# Patient Record
Sex: MALE | Race: WHITE | HISPANIC OR LATINO | Employment: OTHER | URBAN - METROPOLITAN AREA
[De-identification: names, ages, dates, MRNs, and addresses within clinical notes are randomized per-mention and may not be internally consistent; named-entity substitution may affect disease eponyms.]

---

## 2017-01-04 ENCOUNTER — OFFICE VISIT (OUTPATIENT)
Dept: SURGERY | Facility: CLINIC | Age: 47
End: 2017-01-04
Payer: MEDICAID

## 2017-01-04 VITALS
HEART RATE: 111 BPM | SYSTOLIC BLOOD PRESSURE: 155 MMHG | DIASTOLIC BLOOD PRESSURE: 95 MMHG | WEIGHT: 184.06 LBS | BODY MASS INDEX: 23.64 KG/M2

## 2017-01-04 DIAGNOSIS — K60.3 TRANSSPHINCTERIC ANAL FISTULA: Primary | ICD-10-CM

## 2017-01-04 PROCEDURE — 99999 PR PBB SHADOW E&M-EST. PATIENT-LVL III: CPT | Mod: PBBFAC,,, | Performed by: COLON & RECTAL SURGERY

## 2017-01-04 PROCEDURE — 99024 POSTOP FOLLOW-UP VISIT: CPT | Mod: ,,, | Performed by: COLON & RECTAL SURGERY

## 2017-01-04 PROCEDURE — 99213 OFFICE O/P EST LOW 20 MIN: CPT | Mod: PBBFAC | Performed by: COLON & RECTAL SURGERY

## 2017-01-04 RX ORDER — OXYCODONE AND ACETAMINOPHEN 5; 325 MG/1; MG/1
1 TABLET ORAL EVERY 4 HOURS PRN
Qty: 90 TABLET | Refills: 0 | Status: SHIPPED | OUTPATIENT
Start: 2017-01-04 | End: 2017-03-08 | Stop reason: SDUPTHER

## 2017-01-04 NOTE — PROGRESS NOTES
Reports pain since procedure.  Minimal d/c    Exam  Non toxic male in NAD    Visit Vitals    BP (!) 155/95 (BP Location: Right arm, Patient Position: Standing, BP Method: Automatic)    Pulse (!) 111    Wt 83.5 kg (184 lb 1.4 oz)    BMI 23.64 kg/m2         Wounds clean  No erythema.  No d/c    Assessment  Wound clean    Recommend  Local wound care  OV 6 weeks

## 2017-01-19 RX ORDER — IBUPROFEN 800 MG/1
800 TABLET ORAL 3 TIMES DAILY
Qty: 90 TABLET | Refills: 0 | Status: ON HOLD | OUTPATIENT
Start: 2017-01-19 | End: 2017-06-09 | Stop reason: HOSPADM

## 2017-01-25 ENCOUNTER — OFFICE VISIT (OUTPATIENT)
Dept: SURGERY | Facility: CLINIC | Age: 47
End: 2017-01-25
Payer: MEDICAID

## 2017-01-25 VITALS
DIASTOLIC BLOOD PRESSURE: 87 MMHG | WEIGHT: 184.06 LBS | HEART RATE: 93 BPM | SYSTOLIC BLOOD PRESSURE: 140 MMHG | BODY MASS INDEX: 23.64 KG/M2

## 2017-01-25 DIAGNOSIS — K60.3 ANAL FISTULA: Primary | ICD-10-CM

## 2017-01-25 PROCEDURE — 99999 PR PBB SHADOW E&M-EST. PATIENT-LVL III: CPT | Mod: PBBFAC,,, | Performed by: COLON & RECTAL SURGERY

## 2017-01-25 PROCEDURE — 99024 POSTOP FOLLOW-UP VISIT: CPT | Mod: ,,, | Performed by: COLON & RECTAL SURGERY

## 2017-01-25 PROCEDURE — 99213 OFFICE O/P EST LOW 20 MIN: CPT | Mod: PBBFAC | Performed by: COLON & RECTAL SURGERY

## 2017-01-25 NOTE — PROGRESS NOTES
Sore.  Possible new bump.  No increase in d/c.  Occasional BRB on paper/ gauze    Exam  Appears well  Visit Vitals    BP (!) 140/87 (BP Location: Left arm, Patient Position: Sitting, BP Method: Automatic)    Pulse 93    Wt 83.5 kg (184 lb 1.4 oz)    BMI 23.64 kg/m2     Wound      Assessment  No evidence of new abscess/ fistula  Wounds healing well      Recommend  Continue local wound care  Continue setons  Pt reassure.

## 2017-03-08 ENCOUNTER — OFFICE VISIT (OUTPATIENT)
Dept: SURGERY | Facility: CLINIC | Age: 47
End: 2017-03-08
Payer: MEDICAID

## 2017-03-08 VITALS
HEIGHT: 74 IN | SYSTOLIC BLOOD PRESSURE: 163 MMHG | BODY MASS INDEX: 24.84 KG/M2 | DIASTOLIC BLOOD PRESSURE: 89 MMHG | WEIGHT: 193.56 LBS | HEART RATE: 89 BPM

## 2017-03-08 DIAGNOSIS — K60.3 TRANSSPHINCTERIC ANAL FISTULA: Primary | ICD-10-CM

## 2017-03-08 PROCEDURE — 99213 OFFICE O/P EST LOW 20 MIN: CPT | Mod: PBBFAC | Performed by: COLON & RECTAL SURGERY

## 2017-03-08 PROCEDURE — 99999 PR PBB SHADOW E&M-EST. PATIENT-LVL III: CPT | Mod: PBBFAC,,, | Performed by: COLON & RECTAL SURGERY

## 2017-03-08 PROCEDURE — 99024 POSTOP FOLLOW-UP VISIT: CPT | Mod: ,,, | Performed by: COLON & RECTAL SURGERY

## 2017-03-08 RX ORDER — OXYCODONE AND ACETAMINOPHEN 5; 325 MG/1; MG/1
1 TABLET ORAL EVERY 4 HOURS PRN
Qty: 60 TABLET | Refills: 0 | Status: ON HOLD | OUTPATIENT
Start: 2017-03-08 | End: 2017-06-09 | Stop reason: HOSPADM

## 2017-03-08 NOTE — PROGRESS NOTES
"  Feels well.  Minimal pain, discharge.         BP (!) 163/89  Pulse 89  Ht 6' 2" (1.88 m)  Wt 87.8 kg (193 lb 9 oz)  BMI 24.85 kg/m2  penineal / perianal  Posterior midline wound healed  Induration of perianal area improved  Todays photo and prior exam in Dec below it            Assessment  Complex transsphincteric fistula improved    Maintain setons for now  OV 6 weeks to arrange next step surgery  "

## 2017-04-25 ENCOUNTER — OFFICE VISIT (OUTPATIENT)
Dept: SURGERY | Facility: CLINIC | Age: 47
End: 2017-04-25
Payer: MEDICARE

## 2017-04-25 VITALS — WEIGHT: 188.5 LBS | BODY MASS INDEX: 24.2 KG/M2 | SYSTOLIC BLOOD PRESSURE: 118 MMHG | DIASTOLIC BLOOD PRESSURE: 72 MMHG

## 2017-04-25 DIAGNOSIS — K60.3 TRANSSPHINCTERIC ANAL FISTULA: Primary | ICD-10-CM

## 2017-04-25 PROCEDURE — 99999 PR PBB SHADOW E&M-EST. PATIENT-LVL II: CPT | Mod: PBBFAC,,, | Performed by: COLON & RECTAL SURGERY

## 2017-04-25 PROCEDURE — 99213 OFFICE O/P EST LOW 20 MIN: CPT | Mod: S$PBB,,, | Performed by: COLON & RECTAL SURGERY

## 2017-04-25 PROCEDURE — 99212 OFFICE O/P EST SF 10 MIN: CPT | Mod: PBBFAC | Performed by: COLON & RECTAL SURGERY

## 2017-05-02 NOTE — PROGRESS NOTES
Feels better.  No pain.  Minimal d/c    Exam  Appears well  /72 (BP Location: Right leg, Patient Position: Sitting, BP Method: Automatic)  Wt 85.5 kg (188 lb 7.9 oz)  BMI 24.2 kg/m2  Rectal  Setons in position, no erythema.  No pus.  No tenderness.      Assessment  Complex fistula in ano    Recommend  Removed lateral seton  Soak in tub BID  OV 6 weeks

## 2017-05-24 ENCOUNTER — OFFICE VISIT (OUTPATIENT)
Dept: SURGERY | Facility: CLINIC | Age: 47
End: 2017-05-24
Payer: MEDICARE

## 2017-05-24 VITALS
WEIGHT: 186.06 LBS | SYSTOLIC BLOOD PRESSURE: 130 MMHG | DIASTOLIC BLOOD PRESSURE: 74 MMHG | HEIGHT: 74 IN | BODY MASS INDEX: 23.88 KG/M2 | HEART RATE: 65 BPM

## 2017-05-24 DIAGNOSIS — K60.3 FISTULA-IN-ANO: Primary | ICD-10-CM

## 2017-05-24 PROCEDURE — 99213 OFFICE O/P EST LOW 20 MIN: CPT | Mod: S$GLB,,, | Performed by: COLON & RECTAL SURGERY

## 2017-05-24 PROCEDURE — 99213 OFFICE O/P EST LOW 20 MIN: CPT | Mod: PBBFAC | Performed by: COLON & RECTAL SURGERY

## 2017-05-24 PROCEDURE — 99999 PR PBB SHADOW E&M-EST. PATIENT-LVL III: CPT | Mod: PBBFAC,,, | Performed by: COLON & RECTAL SURGERY

## 2017-05-24 NOTE — PROGRESS NOTES
"No significant pain.  Minimal d/c    Exam  Appears well  /74 (BP Location: Right arm, Patient Position: Sitting, BP Method: Automatic)   Pulse 65   Ht 6' 2.02" (1.88 m)   Wt 84.4 kg (186 lb 1.1 oz)   BMI 23.88 kg/m²   Rectal    No induration or fluctuance.  No d/c.  No mass  Not tender    Assessment  Complex fistula in ano, improved.  Probable residual intersphincteric fistula in ano.  NO signs of active suppuration.      Recommend  EUA, probable completion fistulotomy      "

## 2017-05-29 DIAGNOSIS — K60.3 ANAL FISTULA: Primary | ICD-10-CM

## 2017-06-09 ENCOUNTER — ANESTHESIA (OUTPATIENT)
Dept: SURGERY | Facility: HOSPITAL | Age: 47
End: 2017-06-09
Payer: MEDICARE

## 2017-06-09 ENCOUNTER — SURGERY (OUTPATIENT)
Age: 47
End: 2017-06-09

## 2017-06-09 ENCOUNTER — HOSPITAL ENCOUNTER (OUTPATIENT)
Facility: HOSPITAL | Age: 47
Discharge: HOME | End: 2017-06-09
Attending: COLON & RECTAL SURGERY | Admitting: COLON & RECTAL SURGERY
Payer: MEDICARE

## 2017-06-09 ENCOUNTER — ANESTHESIA EVENT (OUTPATIENT)
Dept: SURGERY | Facility: HOSPITAL | Age: 47
End: 2017-06-09
Payer: MEDICARE

## 2017-06-09 VITALS
HEART RATE: 64 BPM | BODY MASS INDEX: 23.74 KG/M2 | OXYGEN SATURATION: 100 % | SYSTOLIC BLOOD PRESSURE: 151 MMHG | RESPIRATION RATE: 13 BRPM | WEIGHT: 185 LBS | TEMPERATURE: 98 F | DIASTOLIC BLOOD PRESSURE: 71 MMHG | HEIGHT: 74 IN

## 2017-06-09 DIAGNOSIS — K60.3 FISTULA-IN-ANO: ICD-10-CM

## 2017-06-09 DIAGNOSIS — K60.3 ANAL FISTULA: Primary | ICD-10-CM

## 2017-06-09 PROCEDURE — 71000044 HC DOSC ROUTINE RECOVERY FIRST HOUR: Performed by: COLON & RECTAL SURGERY

## 2017-06-09 PROCEDURE — 88304 TISSUE EXAM BY PATHOLOGIST: CPT | Performed by: PATHOLOGY

## 2017-06-09 PROCEDURE — 71000015 HC POSTOP RECOV 1ST HR: Performed by: COLON & RECTAL SURGERY

## 2017-06-09 PROCEDURE — 25000003 PHARM REV CODE 250: Performed by: COLON & RECTAL SURGERY

## 2017-06-09 PROCEDURE — 25000003 PHARM REV CODE 250: Performed by: NURSE PRACTITIONER

## 2017-06-09 PROCEDURE — 36000707: Performed by: COLON & RECTAL SURGERY

## 2017-06-09 PROCEDURE — 63600175 PHARM REV CODE 636 W HCPCS: Performed by: NURSE ANESTHETIST, CERTIFIED REGISTERED

## 2017-06-09 PROCEDURE — D9220A PRA ANESTHESIA: Mod: ANES,,, | Performed by: ANESTHESIOLOGY

## 2017-06-09 PROCEDURE — 25000003 PHARM REV CODE 250: Performed by: NURSE ANESTHETIST, CERTIFIED REGISTERED

## 2017-06-09 PROCEDURE — 37000009 HC ANESTHESIA EA ADD 15 MINS: Performed by: COLON & RECTAL SURGERY

## 2017-06-09 PROCEDURE — D9220A PRA ANESTHESIA: Mod: CRNA,,, | Performed by: NURSE ANESTHETIST, CERTIFIED REGISTERED

## 2017-06-09 PROCEDURE — C1763 CONN TISS, NON-HUMAN: HCPCS | Performed by: COLON & RECTAL SURGERY

## 2017-06-09 PROCEDURE — 37000008 HC ANESTHESIA 1ST 15 MINUTES: Performed by: COLON & RECTAL SURGERY

## 2017-06-09 PROCEDURE — 36000706: Performed by: COLON & RECTAL SURGERY

## 2017-06-09 PROCEDURE — 71000045 HC DOSC ROUTINE RECOVERY EA ADD'L HR: Performed by: COLON & RECTAL SURGERY

## 2017-06-09 PROCEDURE — 88304 TISSUE EXAM BY PATHOLOGIST: CPT | Mod: 26,,, | Performed by: PATHOLOGY

## 2017-06-09 RX ORDER — IBUPROFEN 800 MG/1
800 TABLET ORAL 3 TIMES DAILY
Qty: 90 TABLET | Refills: 3 | Status: SHIPPED | OUTPATIENT
Start: 2017-06-09 | End: 2017-08-16

## 2017-06-09 RX ORDER — SODIUM CHLORIDE 9 MG/ML
INJECTION, SOLUTION INTRAVENOUS CONTINUOUS
Status: DISCONTINUED | OUTPATIENT
Start: 2017-06-09 | End: 2017-06-09 | Stop reason: HOSPADM

## 2017-06-09 RX ORDER — HYDROCODONE BITARTRATE AND ACETAMINOPHEN 5; 325 MG/1; MG/1
1 TABLET ORAL EVERY 4 HOURS PRN
Status: DISCONTINUED | OUTPATIENT
Start: 2017-06-09 | End: 2017-06-09 | Stop reason: HOSPADM

## 2017-06-09 RX ORDER — LIDOCAINE HYDROCHLORIDE 10 MG/ML
1 INJECTION, SOLUTION EPIDURAL; INFILTRATION; INTRACAUDAL; PERINEURAL ONCE
Status: COMPLETED | OUTPATIENT
Start: 2017-06-09 | End: 2017-06-09

## 2017-06-09 RX ORDER — LIDOCAINE HCL/PF 100 MG/5ML
SYRINGE (ML) INTRAVENOUS
Status: DISCONTINUED | OUTPATIENT
Start: 2017-06-09 | End: 2017-06-09

## 2017-06-09 RX ORDER — FENTANYL CITRATE 50 UG/ML
INJECTION, SOLUTION INTRAMUSCULAR; INTRAVENOUS
Status: DISCONTINUED | OUTPATIENT
Start: 2017-06-09 | End: 2017-06-09

## 2017-06-09 RX ORDER — ONDANSETRON HYDROCHLORIDE 2 MG/ML
INJECTION, SOLUTION INTRAMUSCULAR; INTRAVENOUS
Status: DISCONTINUED | OUTPATIENT
Start: 2017-06-09 | End: 2017-06-09

## 2017-06-09 RX ORDER — OXYCODONE AND ACETAMINOPHEN 5; 325 MG/1; MG/1
1 TABLET ORAL EVERY 4 HOURS PRN
Qty: 90 TABLET | Refills: 0 | Status: SHIPPED | OUTPATIENT
Start: 2017-06-09 | End: 2017-08-16

## 2017-06-09 RX ORDER — KETAMINE HYDROCHLORIDE 100 MG/ML
INJECTION, SOLUTION INTRAMUSCULAR; INTRAVENOUS
Status: DISCONTINUED | OUTPATIENT
Start: 2017-06-09 | End: 2017-06-09

## 2017-06-09 RX ORDER — ACETAMINOPHEN 10 MG/ML
INJECTION, SOLUTION INTRAVENOUS
Status: DISCONTINUED | OUTPATIENT
Start: 2017-06-09 | End: 2017-06-09

## 2017-06-09 RX ORDER — SODIUM CHLORIDE 9 MG/ML
INJECTION, SOLUTION INTRAVENOUS CONTINUOUS PRN
Status: DISCONTINUED | OUTPATIENT
Start: 2017-06-09 | End: 2017-06-09

## 2017-06-09 RX ORDER — LIDOCAINE HYDROCHLORIDE 10 MG/ML
INJECTION, SOLUTION EPIDURAL; INFILTRATION; INTRACAUDAL; PERINEURAL
Status: DISCONTINUED | OUTPATIENT
Start: 2017-06-09 | End: 2017-06-09 | Stop reason: HOSPADM

## 2017-06-09 RX ORDER — PROPOFOL 10 MG/ML
VIAL (ML) INTRAVENOUS
Status: DISCONTINUED | OUTPATIENT
Start: 2017-06-09 | End: 2017-06-09

## 2017-06-09 RX ORDER — PROPOFOL 10 MG/ML
VIAL (ML) INTRAVENOUS CONTINUOUS PRN
Status: DISCONTINUED | OUTPATIENT
Start: 2017-06-09 | End: 2017-06-09

## 2017-06-09 RX ORDER — MIDAZOLAM HYDROCHLORIDE 1 MG/ML
INJECTION, SOLUTION INTRAMUSCULAR; INTRAVENOUS
Status: DISCONTINUED | OUTPATIENT
Start: 2017-06-09 | End: 2017-06-09

## 2017-06-09 RX ORDER — BUPIVACAINE HYDROCHLORIDE AND EPINEPHRINE 2.5; 5 MG/ML; UG/ML
INJECTION, SOLUTION EPIDURAL; INFILTRATION; INTRACAUDAL; PERINEURAL
Status: DISCONTINUED | OUTPATIENT
Start: 2017-06-09 | End: 2017-06-09 | Stop reason: HOSPADM

## 2017-06-09 RX ORDER — IBUPROFEN 200 MG
600 TABLET ORAL EVERY 6 HOURS PRN
Status: DISCONTINUED | OUTPATIENT
Start: 2017-06-09 | End: 2017-06-09 | Stop reason: HOSPADM

## 2017-06-09 RX ORDER — MOXIFLOXACIN HYDROCHLORIDE 400 MG/1
400 TABLET ORAL DAILY
Qty: 5 TABLET | Refills: 0 | Status: SHIPPED | OUTPATIENT
Start: 2017-06-09 | End: 2017-06-14

## 2017-06-09 RX ORDER — MOXIFLOXACIN HYDROCHLORIDE 400 MG/1
400 TABLET ORAL DAILY
Qty: 5 TABLET | Refills: 0 | Status: SHIPPED | OUTPATIENT
Start: 2017-06-09 | End: 2017-06-09

## 2017-06-09 RX ADMIN — LIDOCAINE HYDROCHLORIDE 0.2 MG: 10 INJECTION, SOLUTION EPIDURAL; INFILTRATION; INTRACAUDAL; PERINEURAL at 10:06

## 2017-06-09 RX ADMIN — PROPOFOL 20 MG: 10 INJECTION, EMULSION INTRAVENOUS at 11:06

## 2017-06-09 RX ADMIN — SODIUM CHLORIDE, SODIUM GLUCONATE, SODIUM ACETATE, POTASSIUM CHLORIDE, MAGNESIUM CHLORIDE, SODIUM PHOSPHATE, DIBASIC, AND POTASSIUM PHOSPHATE: .53; .5; .37; .037; .03; .012; .00082 INJECTION, SOLUTION INTRAVENOUS at 12:06

## 2017-06-09 RX ADMIN — MIDAZOLAM HYDROCHLORIDE 2 MG: 1 INJECTION, SOLUTION INTRAMUSCULAR; INTRAVENOUS at 11:06

## 2017-06-09 RX ADMIN — BUPIVACAINE HYDROCHLORIDE AND EPINEPHRINE BITARTRATE 30 ML: 2.5; .0091 INJECTION, SOLUTION EPIDURAL; INFILTRATION; INTRACAUDAL; PERINEURAL at 12:06

## 2017-06-09 RX ADMIN — FENTANYL CITRATE 25 MCG: 50 INJECTION, SOLUTION INTRAMUSCULAR; INTRAVENOUS at 11:06

## 2017-06-09 RX ADMIN — FENTANYL CITRATE 50 MCG: 50 INJECTION, SOLUTION INTRAMUSCULAR; INTRAVENOUS at 11:06

## 2017-06-09 RX ADMIN — FENTANYL CITRATE 25 MCG: 50 INJECTION, SOLUTION INTRAMUSCULAR; INTRAVENOUS at 12:06

## 2017-06-09 RX ADMIN — LIDOCAINE HYDROCHLORIDE 20 ML: 10 INJECTION, SOLUTION EPIDURAL; INFILTRATION; INTRACAUDAL; PERINEURAL at 01:06

## 2017-06-09 RX ADMIN — SODIUM CHLORIDE: 0.9 INJECTION, SOLUTION INTRAVENOUS at 10:06

## 2017-06-09 RX ADMIN — LIDOCAINE HYDROCHLORIDE 100 MG: 20 INJECTION, SOLUTION INTRAVENOUS at 11:06

## 2017-06-09 RX ADMIN — PROPOFOL 150 MCG/KG/MIN: 10 INJECTION, EMULSION INTRAVENOUS at 11:06

## 2017-06-09 RX ADMIN — ACETAMINOPHEN 1000 MG: 10 INJECTION, SOLUTION INTRAVENOUS at 11:06

## 2017-06-09 RX ADMIN — SODIUM CHLORIDE: 0.9 INJECTION, SOLUTION INTRAVENOUS at 11:06

## 2017-06-09 RX ADMIN — KETAMINE HYDROCHLORIDE 30 MG: 100 INJECTION, SOLUTION, CONCENTRATE INTRAMUSCULAR; INTRAVENOUS at 11:06

## 2017-06-09 RX ADMIN — ONDANSETRON 4 MG: 2 INJECTION, SOLUTION INTRAMUSCULAR; INTRAVENOUS at 11:06

## 2017-06-09 RX ADMIN — HYDROCODONE BITARTRATE AND ACETAMINOPHEN 1 TABLET: 5; 325 TABLET ORAL at 01:06

## 2017-06-09 RX ADMIN — SODIUM CHLORIDE: 0.9 INJECTION, SOLUTION INTRAVENOUS at 01:06

## 2017-06-09 NOTE — PROGRESS NOTES
Dr. Ventura at bedside to assess patient and discuss findings and recommendations with patient and patient's family.

## 2017-06-09 NOTE — PLAN OF CARE
Patient and patient's mother received discharge instructions and prescriptions.  Patient and patient's mother verbalized understanding of all instructions given and all questions were addressed prior to patient's discharge.  Patient's vital signs are stable and within patient's baseline.  Patient tolerated clear liquids PO.  Patient voided 375 mL of clear yellow urine without difficulty in post-op.  Patient denies pain.  Patient denies nausea and vomiting at this time.  Patient meets all criteria for discharge at this time.  All required consents present in patient's chart upon patient's discharge.

## 2017-06-09 NOTE — BRIEF OP NOTE
[facility]  Brief Operative Note    SUMMARY     Surgery Date: 6/9/2017     Surgeon(s) and Role:     * Patric Ventura MD - Fellow     * TROY Inman MD - Primary    Assisting Surgeon: None    Pre-op Diagnosis:  Anal fistula [K60.3]    Operative Care:  Post-op Diagnosis: Post-Op Diagnosis Codes:     * Anal fistula [K60.3]    Procedure(s) (LRB):  EXAM UNDER ANESTHESIA (N/A)  FISTULECTOMY  PLACEMENT-ANAL PLUG    Anesthesia: General/MAC  Total volume administered 1100   Total UOP: N/A    Technical Procedures Used: fistulectomy and anal fistula plug    Description of the findings of the procedure: RPL external opening with internal opening at the RPL of anus 2cm proximal to the dentate line  Wound Class (Dirty    Complications: No     Estimated Blood Loss: 25 mL           Specimens:   Specimen (12h ago through future)    Start     Ordered    06/09/17 1307  Specimen to Pathology - Surgery  Once     Comments:  1.) fistula- permanent.      06/09/17 1306          Implants:   Implant Name Type Inv. Item Serial No.  Lot No. LRB No. Used   fistula plug         [id number] N/A 1       Post-Operative Care:         Disposition: PACU - hemodynamically stable.           Condition: Good  PT Temp >36 upon leaving the OR? Yes

## 2017-06-09 NOTE — ANESTHESIA PREPROCEDURE EVALUATION
06/09/2017  Anmol Angeles Jr. is a 46 y.o., male.    Pre-op Assessment    I have reviewed the Patient Summary Reports.     I have reviewed the Nursing Notes.   I have reviewed the Medications.     Review of Systems  Anesthesia Hx:  No problems with previous Anesthesia  History of prior surgery of interest to airway management or planning: Previous anesthesia: General Denies Family Hx of Anesthesia complications.   Denies Personal Hx of Anesthesia complications.   Social:  Smoker    Cardiovascular:   Exercise tolerance: good    Neurological:   Headaches Seizures, well controlled   Chronic Pain Syndrome   Psych:   Psychiatric History          Physical Exam  General:  Well nourished    Airway/Jaw/Neck:  Airway Findings: Mouth Opening: Normal Tongue: Normal  General Airway Assessment: Adult  Mallampati: II  Improves to II with phonation.  TM Distance: Normal, at least 6 cm  Jaw/Neck Findings:  Neck ROM: Normal ROM      Dental:  Dental Findings: In tact   Chest/Lungs:  Chest/Lungs Findings: Clear to auscultation, Normal Respiratory Rate     Heart/Vascular:  Heart Findings: Rate: Normal  Rhythm: Regular Rhythm  Sounds: Normal        Mental Status:  Mental Status Findings:  Cooperative, Alert and Oriented         Anesthesia Plan  Type of Anesthesia, risks & benefits discussed:  Anesthesia Type:  general  Patient's Preference:   Intra-op Monitoring Plan: standard ASA monitors  Intra-op Monitoring Plan Comments:   Post Op Pain Control Plan:   Post Op Pain Control Plan Comments:   Induction:   IV  Beta Blocker:  Patient is not currently on a Beta-Blocker (No further documentation required).       Informed Consent: Patient understands risks and agrees with Anesthesia plan.  Questions answered. Anesthesia consent signed with patient.  ASA Score: 2     Day of Surgery Review of History & Physical:    H&P update referred to  the surgeon.         Ready For Surgery From Anesthesia Perspective.

## 2017-06-09 NOTE — H&P
"  CC complex chronic fistula in ano, transsphincteric      HPI  48 yo male with chronic complex fistula.   One last fistula with seton.  No significant pain.  Minimal d/c    Past Medical History:   Diagnosis Date    Perirectal fistula     Seizures      Past Surgical History:   Procedure Laterality Date    BACK SURGERY      COLONOSCOPY N/A 8/22/2016    Procedure: COLONOSCOPY;  Surgeon: TROY Inman MD;  Location: [facility] ([address] [place] [address]);  Service: Endoscopy;  Laterality: N/A;    fistula surgery      LEG SURGERY      plate    LEG SURGERY Right      Review of patient's allergies indicates:   Allergen Reactions    Latex, natural rubber      No current facility-administered medications on file prior to encounter.      No current outpatient prescriptions on file prior to encounter.     History reviewed. No pertinent family history.  Social History     Social History    Marital status: Single     Spouse name: N/A    Number of children: N/A    Years of education: N/A     Occupational History    Maintenance with [facility]      Social History Main Topics    Smoking status: Current Every Day Smoker     Packs/day: 1.50     Years: 30.00    Smokeless tobacco: Current User     Types: Chew    Alcohol use Yes      Comment: beer occasionally     Drug use:      Types: Marijuana    Sexual activity: Not on file     Other Topics Concern    Not on file     Social History Narrative    No narrative on file         Exam  Appears well  BP (!) 132/90 (BP Location: Left arm, Patient Position: Lying, BP Method: Automatic)   Pulse 64   Temp 98.4 °F (36.9 °C) (Oral)   Resp 16   Ht 6' 2" (1.88 m)   Wt 83.9 kg (185 lb)   SpO2 100%   BMI 23.75 kg/m²    AT NC EOMI  Neck supple, trachea midline  Chest symmetric nl excursions, no retractions  Breathing comfortably  COR RRR   Rectal    No induration or fluctuance.  No d/c.  No mass  Not tender  Ext no CCE  Neuro mood nl, alert, oriented, Mendoza purposefully,  " Gait nl    Assessment  Complex fistula in ano, improved.  Probable residual intersphincteric fistula in ano.  NO signs of active suppuration.      Recommend  EUA, probable completion fistulotomy    The patient has been examined and the H&P has been reviewed:  I concur with the findings and no changes have occurred since H&P was written.      Anesthesia/Surgery risks, benefits and alternative options discussed and understood by patient/family.

## 2017-06-09 NOTE — TRANSFER OF CARE
"Anesthesia Transfer of Care Note    Patient: Anmol Angeles Jr.    Procedure(s) Performed: Procedure(s) (LRB):  EXAM UNDER ANESTHESIA (N/A)  FISTULECTOMY  PLACEMENT-ANAL PLUG    Patient location: [facility]    Anesthesia Type: general    Transport from OR: Transported from OR on 6-10 L/min O2 by face mask with adequate spontaneous ventilation    Post pain: adequate analgesia    Post assessment: tolerated procedure well and no apparent anesthetic complications    Post vital signs: stable    Level of consciousness: sedated and responds to stimulation    Nausea/Vomiting: no nausea/vomiting    Complications: none    Transfer of care protocol was followed      Last vitals:   Visit Vitals  BP (!) 132/90 (BP Location: Left arm, Patient Position: Lying, BP Method: Automatic)   Pulse 64   Temp 36.9 °C (98.4 °F) (Oral)   Resp 16   Ht 6' 2" (1.88 m)   Wt 83.9 kg (185 lb)   SpO2 100%   BMI 23.75 kg/m²     "

## 2017-06-09 NOTE — PROGRESS NOTES
"No significant pain.  Minimal d/c    Exam  Appears well  BP (!) 132/90 (BP Location: Left arm, Patient Position: Lying, BP Method: Automatic)   Pulse 64   Temp 98.4 °F (36.9 °C) (Oral)   Resp 16   Ht 6' 2" (1.88 m)   Wt 83.9 kg (185 lb)   SpO2 100%   BMI 23.75 kg/m²   Rectal    No induration or fluctuance.  No d/c.  No mass  Not tender    Assessment  Complex fistula in ano, improved.  Probable residual intersphincteric fistula in ano.  NO signs of active suppuration.      Recommend  EUA, probable completion fistulotomy    The patient has been examined and the H&P has been reviewed:  I concur with the findings and no changes have occurred since H&P was written.      Anesthesia/Surgery risks, benefits and alternative options discussed and understood by patient/family.      "

## 2017-06-09 NOTE — PROGRESS NOTES
Notified Dr. Buck and OR desk patient had a sip of milk at 0530 this morning. Was instructed procedure will be pushed back until 1130. Patient updated.

## 2017-06-09 NOTE — DISCHARGE INSTRUCTIONS
Home Care Instructions  ANAL/RECTAL SURGERY    ACTIVITY LEVEL: If you received sedation and/or an anesthetic, you may feel sleepy for several hours.  Rest until you feel more awake. Gradually resume your normal activities.    DIET: At home, continue with liquids. If there is no nausea, you may eat a soft diet and gradually resume a high fiber diet. Encourage fluids.    SPECIAL INSTRUCTIONS: Eat plenty of fruits and vegetables. Drink 8-10 glasses of water or juice every day. Eat whole grain cereals and breads. Salads with raw vegetables are also a good source of fiber.    DRESSING: Remove your bandage in 24 hours.  You may use 4 x 4 gauze to the surgical area for any drainage. Wash the area with mild soap during your regular bath.  Use soft, damp tissue or disposable wipes not containing alcohol when wiping after bowel movements, pat the area and gently dry.  Avoid excessive or vigorous wiping. It may help to place soft tissue or a cotton pad between the buttocks to keep the cheeks  and to absorb any moisture.    MEDICIATIONS: You will receive instructions for your pain and/or antibiotic prescriptions. Pain medication should be taken only if needed, and as directed. Antibiotics should be taken as directed until the entire prescription is completed. If ordered, take stool softeners as directed.    WHEN TO CALL THE DOCTOR:   For any obvious active bleeding   Redness or swelling around the incision   Fever over 100.4°F   Drainage (pus) from the wound   Persistent pain not relieved by the pain medication    RETURN APPOINTMENT: If your follow up appointment has not already been scheduled, please call Dr. Inman clinic office to schedule your follow up appointment as soon as possible.    FOR EMERGENCIES:  If any unusual problems or difficulties occur, contact Dr. Inman or the resident at [phone] (page ) or at the Clinic office, [phone].

## 2017-06-09 NOTE — PROGRESS NOTES
Notified Dr. Almonte that patient has met all criteria for release from Anesthesia's care.  Dr. Do stated that he would release patient from Anesthesia's care.

## 2017-06-09 NOTE — PLAN OF CARE
Patient arrived from OR with SIRISHA Bose RN and ROCKY Arauz CRNA.  Patient stable.  Report received at this time.  Assumed care of patient at this time.

## 2017-06-09 NOTE — DISCHARGE SUMMARY
[facility]  Discharge Note  Short Stay    Admit Date: 6/9/2017    Discharge Date and Time: 6/9/2017  4:25 PM     Attending Physician: TROY Inman MD    Discharge Provider: Patric Ventura    Diagnoses:  Active Hospital Problems    Diagnosis  POA    Fistula-in-ano [K60.3]  Yes    Anal fistula [K60.3]  Yes      Resolved Hospital Problems    Diagnosis Date Resolved POA   No resolved problems to display.       Discharged Condition: good    Hospital Course: Patient was admitted for an outpatient procedure and tolerated the procedure well with no complications.    Final Diagnoses: Same as principal problem.    Disposition: Home or Self Care    Follow up/Patient Instructions:    Medications:  Reconciled Home Medications:   Discharge Medication List as of 6/9/2017  3:18 PM      CONTINUE these medications which have CHANGED    Details   ibuprofen (ADVIL,MOTRIN) 800 MG tablet Take 1 tablet (800 mg total) by mouth 3 (three) times daily., Starting Fri 6/9/2017, Print      moxifloxacin (AVELOX) 400 mg tablet Take 1 tablet (400 mg total) by mouth once daily., Starting Fri 6/9/2017, Until Wed 6/14/2017, Print      oxycodone-acetaminophen (PERCOCET) 5-325 mg per tablet Take 1 tablet by mouth every 4 (four) hours as needed for Pain., Starting Fri 6/9/2017, Print             Discharge Procedure Orders  Diet general     Call MD for:  temperature >100.4     Call MD for:  persistent nausea and vomiting     Call MD for:  severe uncontrolled pain     Call MD for:  redness, tenderness, or signs of infection (pain, swelling, redness, odor or green/yellow discharge around incision site)     No dressing needed     Remove dressing in 24 hours     Wound care routine (specify)   Order Comments: Wound care routine:    Some bleeding expected  Take fiber supplements such as Metamucil, Citrucel, Konsyl,  The goal is 1-2 nonstraining nonloose bowel movements per day.  Sitz Baths or tub soaks three times a day in warm water  We  recommend 25-30 grams of fiber daily to reach the above bowel movement goal.     Activity as tolerated   Order Comments: No Bike riding  No vigourous activity   No lifiting  No smoking or drinking   Take antibiotics as prescribed       Follow-up Information     H Emerson Inman MD. Schedule an appointment as soon as possible for a visit in 2 weeks.    Specialty:  Colon and Rectal Surgery  Contact information:  [address]  [facility] [id number] [phone]

## 2017-06-09 NOTE — ANESTHESIA POSTPROCEDURE EVALUATION
"Anesthesia Post Evaluation    Patient: Anmol Angeles Jr.    Procedure(s) Performed: Procedure(s) (LRB):  EXAM UNDER ANESTHESIA (N/A)  FISTULECTOMY  PLACEMENT-ANAL PLUG    Final Anesthesia Type: general  Patient location during evaluation: PACU  Patient participation: Yes- Able to Participate  Level of consciousness: awake and alert  Post-procedure vital signs: reviewed and stable  Pain management: adequate  Airway patency: patent  PONV status at discharge: No PONV  Anesthetic complications: no      Cardiovascular status: blood pressure returned to baseline  Respiratory status: unassisted  Hydration status: euvolemic          Visit Vitals  /80 (BP Location: Right arm, Patient Position: Lying, BP Method: cNIBP)   Pulse (!) 49   Temp 36.5 °C (97.7 °F) (Temporal)   Resp 10   Ht 6' 2" (1.88 m)   Wt 83.9 kg (185 lb)   SpO2 100%   BMI 23.75 kg/m²       Pain/Myles Score: Pain Assessment Performed: Yes (6/9/2017  1:25 PM)  Presence of Pain: complains of pain/discomfort (6/9/2017  1:25 PM)  Pain Rating Prior to Med Admin: 6 (6/9/2017  1:25 PM)  Myles Score: 9 (6/9/2017  1:25 PM)      "

## 2017-06-14 NOTE — OP NOTE
DATE OF PROCEDURE:  06/09/2017    INDICATIONS:  The patient is a 47-year-old male with a history of a complex   transsphincteric fistula-in-ano.  He essentially came to me several years ago   with a failed fistula operation by an outside hospital.  Essentially, he had a   horseshoe type fistula with external openings over both ischiorectal fossae as   well as the posterior midline.  Essentially, all the fistulas have now healed   with a variety of treatments, fistulectomy, and seton insertion, and simple   fistulotomy.  He has a fistula in the right posterolateral position, which has   been well controlled with a non-cutting seton.  The patient was brought to the   Operating Room at this time for definitive surgical treatment, possible   fistulotomy.    PREOPERATIVE DIAGNOSIS:  Complex fistula-in-ano, probable transsphincteric.    POSTOPERATIVE DIAGNOSIS:  Complex fistula-in-ano, probable transsphincteric.    NAME OF PROCEDURE:  Exam under anesthesia, repair of chronic fistula with   fistulectomy and fistula plug insertion.    SURGEON:  TROY Inman M.D.    ASSISTANT SURGEON:  Patric Ventura M.D. (RES)    TYPE OF ANESTHESIA:  General endotracheal.    ESTIMATED BLOOD LOSS:  25 mL.    DRAINS:  None.    SPECIMEN:  Fistulectomy specimen.    FINDINGS:  External opening was noted in the right posterolateral position.  It   was essentially transsphincteric with an internal opening that was above the   level of the dentate line.  This was felt to be not amenable to fistulotomy.    Because of the high location of the fistula and the presence of significant   scarring in the region of the internal opening, he was also felt not to be a   candidate for endorectal advancement flap.  The decision was made to perform   fistulectomy with excision of the distal aspect of the fistula and repair of the   fistula with an insertion of a [place] BIO-A fistula plug.    TECHNIQUE IN DETAIL:  The patient was brought to the Operating Room,  positively   identified, and remained on the gurney in the supine position.  The patient   underwent general endotracheal anesthesia without complication.  He was then   placed on the operating table in a prone jackknife position with buttock cheeks   taped apart.  His perianal skin was prepared and draped in the usual fashion.    Critical timeout was performed.  Exam under anesthesia revealed evidence of a   fistula with an external opening in the right posterior position.  The external   opening was noted to be outside the external sphincter muscle and therefore   constituted a transsphincteric fistula.  Digital rectal examination was   unremarkable.  There was no evidence of any pus.  There was induration that was   chronic in the right posterior and right lateral position.  There was no   fluctuance to suggest ongoing suppuration.  A lighted Violetta-Eaton retractor   was placed in his anal canal.  We could visualize the internal opening, which   was a cm above the level of the dentate line.  This fistula was felt not to be a   candidate for fistulotomy given its transsphincteric anatomy.  In addition, the   internal opening was noted to be above the level of the dentate line and there   was significant scarring around the external opening, which did not appear to be   amenable to endorectal advancement flap treatment.  The decision was made at   this point to proceed to repair using a fistula plug.  I elected to perform   fistulectomy.  An incision encompassed the external opening and the external   opening was grasped with an Allis clamp.  The distal two-thirds of the fistula   was then excised sharply and handed off the operative field.  The wound was   irrigated with saline solution.  I then brought in a [place] BIO-A fistula plug on   to the field.  I assessed the diameter of the residual fistula at its opening.    We excised two of the appendages of the fistula plug and then sutured the other   appendages  together using 0 silk suture.  This was lubricated.  I placed a right   angle clamp through the fistula tract and grasped the silk tie and the end of   the fistula plug and I brought the fistula plug through the tract.  The button   at the end of the tract was then sutured to the rectal mucosa using interrupted   3-0 Vicryl suture with purchases of this mucosa, submucosa, and internal   sphincter muscle.    The plugs filled the fistula tract and they were trimmed at the level of the   skin.  The wound was irrigated with saline solution.  We injected local   anesthetic around the fistula site.  A dry gauze dressing was applied to the   wound and held in place with mesh underwear.    The patient tolerated the procedure well and there were no complications noted.    He was returned to the supine position, awakened from anesthesia, and extubated   without incident.      CLEMENTE/LISBETH  dd: 06/14/2017 17:51:56 (CDT)  td: 06/14/2017 18:34:40 (CDT)  Doc ID   #[id number]  Job ID #[id number]    CC:

## 2017-07-05 ENCOUNTER — OFFICE VISIT (OUTPATIENT)
Dept: SURGERY | Facility: CLINIC | Age: 47
End: 2017-07-05
Payer: MEDICARE

## 2017-07-05 VITALS
WEIGHT: 187.19 LBS | SYSTOLIC BLOOD PRESSURE: 144 MMHG | DIASTOLIC BLOOD PRESSURE: 81 MMHG | HEART RATE: 72 BPM | HEIGHT: 74 IN | BODY MASS INDEX: 24.02 KG/M2

## 2017-07-05 DIAGNOSIS — K60.3 TRANSSPHINCTERIC ANAL FISTULA: Primary | ICD-10-CM

## 2017-07-05 PROCEDURE — 99999 PR PBB SHADOW E&M-EST. PATIENT-LVL III: CPT | Mod: PBBFAC,,, | Performed by: COLON & RECTAL SURGERY

## 2017-07-05 PROCEDURE — 99024 POSTOP FOLLOW-UP VISIT: CPT | Mod: S$GLB,,, | Performed by: COLON & RECTAL SURGERY

## 2017-07-20 NOTE — PROGRESS NOTES
"Patient is seen in follow-up of his fistula plug insertion for chronic and complex transfer enteric fistula in anal.  He feels well.  He denies any pain or significant drainage.  Thinks that it is healing up well.    Exam  Appears well.  BP (!) 144/81 (BP Location: Left arm, Patient Position: Sitting, BP Method: Automatic)   Pulse 72   Ht 6' 2.02" (1.88 m)   Wt 84.9 kg (187 lb 2.7 oz)   BMI 24.02 kg/m²   Rectal exam          No erythema, tenderness or discharge.  Wound nearly epithelialized    Assessment  Doing well following  [place] BIO A fistula l plug insertion with evidence of healing of fistula  And no evidence of wound infection    Return to clinic 6 weeks.  Continue limited physical activity including avoidance of sitting on fistula plug    "

## 2017-08-16 ENCOUNTER — OFFICE VISIT (OUTPATIENT)
Dept: SURGERY | Facility: CLINIC | Age: 47
End: 2017-08-16
Payer: MEDICARE

## 2017-08-16 VITALS
HEART RATE: 74 BPM | DIASTOLIC BLOOD PRESSURE: 80 MMHG | HEIGHT: 74 IN | WEIGHT: 184.5 LBS | SYSTOLIC BLOOD PRESSURE: 120 MMHG | BODY MASS INDEX: 23.68 KG/M2

## 2017-08-16 DIAGNOSIS — K60.3 TRANSSPHINCTERIC ANAL FISTULA: Primary | ICD-10-CM

## 2017-08-16 PROCEDURE — 99999 PR PBB SHADOW E&M-EST. PATIENT-LVL III: CPT | Mod: PBBFAC,,, | Performed by: COLON & RECTAL SURGERY

## 2017-08-16 PROCEDURE — 3008F BODY MASS INDEX DOCD: CPT | Mod: S$GLB,,, | Performed by: COLON & RECTAL SURGERY

## 2017-08-16 PROCEDURE — 99024 POSTOP FOLLOW-UP VISIT: CPT | Mod: S$GLB,,, | Performed by: COLON & RECTAL SURGERY

## 2017-08-16 NOTE — PROGRESS NOTES
"Feels great.  No pain.  No discharge.      Appears well  /80 (BP Location: Left arm, Patient Position: Sitting, BP Method: Large (Automatic))   Pulse 74   Ht 6' 2.02" (1.88 m)   Wt 83.7 kg (184 lb 8.4 oz)   BMI 23.68 kg/m²   Rectal exam      External opening epithelialized.    No d/c.  No tenderness.  No mass or induration    Assessment  Transsphincteric fistula healed      Plan  RTC 3months for anoscopy to assess internal opening      "

## 2017-10-03 ENCOUNTER — TELEPHONE (OUTPATIENT)
Dept: SURGERY | Facility: CLINIC | Age: 47
End: 2017-10-03

## 2017-10-03 NOTE — TELEPHONE ENCOUNTER
----- Message from Shaheen Ge sent at 10/3/2017 10:33 AM CDT -----  Contact: Pt:[phone]  Pt called and states he would like to speak with the Nurse of  in regards to the pt having leakage from the fistula. Pt is schedule for an on 10/04 tomorrow.

## 2017-10-04 ENCOUNTER — OFFICE VISIT (OUTPATIENT)
Dept: SURGERY | Facility: CLINIC | Age: 47
End: 2017-10-04
Payer: MEDICARE

## 2017-10-04 VITALS
WEIGHT: 186.06 LBS | BODY MASS INDEX: 23.88 KG/M2 | HEART RATE: 80 BPM | SYSTOLIC BLOOD PRESSURE: 147 MMHG | DIASTOLIC BLOOD PRESSURE: 88 MMHG

## 2017-10-04 DIAGNOSIS — K60.3 ANAL FISTULA: Primary | ICD-10-CM

## 2017-10-04 PROCEDURE — 99999 PR PBB SHADOW E&M-EST. PATIENT-LVL III: CPT | Mod: PBBFAC,,, | Performed by: COLON & RECTAL SURGERY

## 2017-10-04 PROCEDURE — 99213 OFFICE O/P EST LOW 20 MIN: CPT | Mod: S$GLB,,, | Performed by: COLON & RECTAL SURGERY

## 2017-10-04 RX ORDER — MOXIFLOXACIN HYDROCHLORIDE 400 MG/1
400 TABLET ORAL DAILY
Qty: 14 TABLET | Refills: 0 | Status: SHIPPED | OUTPATIENT
Start: 2017-10-04 | End: 2018-06-06

## 2017-10-04 NOTE — PATIENT INSTRUCTIONS
Stay off motorcycle and sitting for prolonged periods of time  Shower into wound BID  Antibiotic ointment to wound BID  Avelox x 14 days  OV 6 weeks

## 2017-10-04 NOTE — PROGRESS NOTES
Minimal d/c.  No hard pain or swelling.      Exam  Appears well  BP (!) 147/88 (BP Location: Right arm, Patient Position: Sitting, BP Method: Medium (Automatic))   Pulse 80   Wt 84.4 kg (186 lb 1.1 oz)   BMI 23.88 kg/m²     Rectal               NO tenderness, d/c or erythema    Assessment  Wound healed  No clear evidence of infection but pt quite anxious about possible recurrent fistula    Recommend  Stay off motorcycle and sitting for prolonged periods of time  Shower into wound BID  Antibiotic ointment to wound BID  Avelox x 14 days  OV 6 weeks

## 2017-11-22 ENCOUNTER — OFFICE VISIT (OUTPATIENT)
Dept: SURGERY | Facility: CLINIC | Age: 47
End: 2017-11-22
Payer: MEDICARE

## 2017-11-22 VITALS
HEART RATE: 77 BPM | WEIGHT: 184.5 LBS | HEIGHT: 74 IN | SYSTOLIC BLOOD PRESSURE: 137 MMHG | BODY MASS INDEX: 23.68 KG/M2 | DIASTOLIC BLOOD PRESSURE: 83 MMHG

## 2017-11-22 DIAGNOSIS — K62.5 ANAL BLEEDING: Primary | ICD-10-CM

## 2017-11-22 PROCEDURE — 99213 OFFICE O/P EST LOW 20 MIN: CPT | Mod: S$GLB,,, | Performed by: COLON & RECTAL SURGERY

## 2017-11-22 PROCEDURE — 99999 PR PBB SHADOW E&M-EST. PATIENT-LVL III: CPT | Mod: PBBFAC,,, | Performed by: COLON & RECTAL SURGERY

## 2017-11-22 NOTE — PROGRESS NOTES
"Minimal spotting.  Not pus or stool.  No pain.  Continent    Exam  Appears well  /83   Pulse 77   Ht 6' 2" (1.88 m)   Wt 83.7 kg (184 lb 8.4 oz)   BMI 23.69 kg/m²   Rectal       Tiny granulation at 2 o clock or right posterior.  No erythema.  No fluctuance    Assessment  Fistula healed.    Granulation tissue    Recommend  AgNO3 to wound  OV in 6 weeks      "

## 2018-01-03 ENCOUNTER — OFFICE VISIT (OUTPATIENT)
Dept: SURGERY | Facility: CLINIC | Age: 48
End: 2018-01-03
Payer: MEDICARE

## 2018-01-03 VITALS
DIASTOLIC BLOOD PRESSURE: 82 MMHG | WEIGHT: 189.13 LBS | HEART RATE: 87 BPM | BODY MASS INDEX: 24.29 KG/M2 | SYSTOLIC BLOOD PRESSURE: 135 MMHG

## 2018-01-03 DIAGNOSIS — K60.3 TRANSSPHINCTERIC ANAL FISTULA: Primary | ICD-10-CM

## 2018-01-03 PROCEDURE — 99999 PR PBB SHADOW E&M-EST. PATIENT-LVL III: CPT | Mod: PBBFAC,,, | Performed by: COLON & RECTAL SURGERY

## 2018-01-03 PROCEDURE — 99213 OFFICE O/P EST LOW 20 MIN: CPT | Mod: S$GLB,,, | Performed by: COLON & RECTAL SURGERY

## 2018-01-03 NOTE — PROGRESS NOTES
Follow up fistula in ano s/p fistula plug.  No pain.  Discomfort.  Small amt of clear d/c since New Years.  NOT pus  No fever.    Exam  Appears well  /82 (BP Location: Left arm, Patient Position: Sitting, BP Method: Medium (Automatic))   Pulse 87   Wt 85.8 kg (189 lb 2.5 oz)   BMI 24.29 kg/m²   Rectal   Inspection           Fistula plug site - complete epithelialization    Assessment  Fistula healed  No signs of infection    RTC prn

## 2018-05-30 ENCOUNTER — TELEPHONE (OUTPATIENT)
Dept: SURGERY | Facility: CLINIC | Age: 48
End: 2018-05-30

## 2018-05-30 NOTE — TELEPHONE ENCOUNTER
----- Message from Carlie Saleh sent at 5/30/2018 11:06 AM CDT -----  Contact: pt#[phone]  Pt is calling for a sooner appt. He states that he's having issues with fistula. Please call

## 2018-05-30 NOTE — TELEPHONE ENCOUNTER
Pt had rectal pain Sunday and then was relieved when the area opened and drained a good amount of green drainage. Pain is much less now. Appt made for Wed and instructed him to do the sitz baths

## 2018-06-05 NOTE — PROGRESS NOTES
HPI:  Anmol Angeles Jr. is a 47 y.o. male with history of complex fistula in ANO.  This had completely healed following multiple operations and ultimately fistula plug insertion.  He noted last week and itch which developed into discomfort and then drainage of pus for approximately 3 days.  The discomfort has gone away.  He denies any further drainage. He denies any fevers        Past Medical History:   Diagnosis Date    Perirectal fistula     Seizures         Past Surgical History:   Procedure Laterality Date    BACK SURGERY      COLONOSCOPY N/A 8/22/2016    Procedure: COLONOSCOPY;  Surgeon: TROY Inman MD;  Location: [facility] ([address] [place] [address]);  Service: Endoscopy;  Laterality: N/A;    fistula surgery      LEG SURGERY      plate    LEG SURGERY Right        Review of patient's allergies indicates:   Allergen Reactions    Latex, natural rubber        No family history on file.    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  Integument: No rashes, redness, icterus  CHEST: Denies DILLON, cyanosis, wheezing, cough and sputum production.  CARDIOVASCULAR: Denies chest pain, PND, orthopnea or reduced exercise tolerance.  GI: Denies abd pain, dysphagia, nausea, vomiting, no hematemesis   : Denies burning on urination, no hematuria, no bacteriuria  MSK: No deformities, swelling, joint pain  "swelling  Neurologic: No HAs, seizures, weakness, paresthesias, gait problems    PE:  General appearance nontoxic  /85 (BP Location: Right arm, Patient Position: Sitting, BP Method: Large (Automatic))   Pulse (!) 58   Ht 6' 2" (1.88 m)   Wt 84.5 kg (186 lb 4.6 oz)   BMI 23.92 kg/m²   Sclera/ Skin ancteric  AT NC EOMI  Neck supple trachea midline, thyroid not enlarged  Chest symmetric, nl excursion, no retractions, breathing comfortably  EXT - no CCE  Neuro:  Mood/ affect nl, alert and oriented x 3, moves all ext's, gait nl    Rectal  Inspection       No external fluctuance.  Mild induration, right posterior position. No pus on palpation  URMILA no mass      Assessment:  Possible recurrent abscess - apparently spontaneously drained  No stigmata of ongoing perianal sepsis    Plan:  Empiric antibiotics  Office visit 1 month  MRI if recurrent    "

## 2018-06-06 ENCOUNTER — OFFICE VISIT (OUTPATIENT)
Dept: SURGERY | Facility: CLINIC | Age: 48
End: 2018-06-06
Payer: MEDICARE

## 2018-06-06 VITALS
WEIGHT: 186.31 LBS | DIASTOLIC BLOOD PRESSURE: 85 MMHG | SYSTOLIC BLOOD PRESSURE: 136 MMHG | HEIGHT: 74 IN | HEART RATE: 58 BPM | BODY MASS INDEX: 23.91 KG/M2

## 2018-06-06 DIAGNOSIS — K61.2 ABSCESS OF ANAL AND RECTAL REGIONS: Primary | ICD-10-CM

## 2018-06-06 PROCEDURE — 99213 OFFICE O/P EST LOW 20 MIN: CPT | Mod: S$GLB,,, | Performed by: COLON & RECTAL SURGERY

## 2018-06-06 PROCEDURE — 99999 PR PBB SHADOW E&M-EST. PATIENT-LVL III: CPT | Mod: PBBFAC,,, | Performed by: COLON & RECTAL SURGERY

## 2018-06-06 PROCEDURE — 3008F BODY MASS INDEX DOCD: CPT | Mod: CPTII,S$GLB,, | Performed by: COLON & RECTAL SURGERY

## 2018-06-06 RX ORDER — NAPROXEN SODIUM 220 MG
220 TABLET ORAL
COMMUNITY
End: 2018-08-14

## 2018-06-06 RX ORDER — AMOXICILLIN AND CLAVULANATE POTASSIUM 875; 125 MG/1; MG/1
1 TABLET, FILM COATED ORAL EVERY 12 HOURS
Qty: 28 TABLET | Refills: 0 | Status: SHIPPED | OUTPATIENT
Start: 2018-06-06 | End: 2018-08-14

## 2018-06-08 ENCOUNTER — TELEPHONE (OUTPATIENT)
Dept: SURGERY | Facility: CLINIC | Age: 48
End: 2018-06-08

## 2018-06-08 NOTE — TELEPHONE ENCOUNTER
Pt says the fistula has opened and is draining again. Asked if he would like to see a doctor today but Dr Inman is out of town. He said he recorded it and will send me the video of it draining. Told him Dr Inman will be back Wednesday and to send it then. He did not want an appt with another doctor will wait to see what Dr Inman says.

## 2018-06-08 NOTE — TELEPHONE ENCOUNTER
----- Message from Celeste Clinton MA sent at 6/8/2018 11:16 AM CDT -----  Contact: self  [phone]  Needs Advice    Reason for call:    States the fistula is leaking again  Communication Preference:  Phone# above  Additional Information:  na

## 2018-08-14 ENCOUNTER — OFFICE VISIT (OUTPATIENT)
Dept: INTERNAL MEDICINE | Facility: CLINIC | Age: 48
End: 2018-08-14
Payer: MEDICARE

## 2018-08-14 ENCOUNTER — LAB VISIT (OUTPATIENT)
Dept: LAB | Facility: HOSPITAL | Age: 48
End: 2018-08-14
Payer: MEDICARE

## 2018-08-14 ENCOUNTER — HOSPITAL ENCOUNTER (OUTPATIENT)
Dept: RADIOLOGY | Facility: HOSPITAL | Age: 48
Discharge: HOME | End: 2018-08-14
Attending: NURSE PRACTITIONER
Payer: MEDICARE

## 2018-08-14 VITALS
BODY MASS INDEX: 24.16 KG/M2 | HEIGHT: 74 IN | WEIGHT: 188.25 LBS | DIASTOLIC BLOOD PRESSURE: 80 MMHG | OXYGEN SATURATION: 99 % | SYSTOLIC BLOOD PRESSURE: 128 MMHG | HEART RATE: 73 BPM | TEMPERATURE: 100 F

## 2018-08-14 DIAGNOSIS — R59.0 AXILLARY LYMPHADENOPATHY: ICD-10-CM

## 2018-08-14 DIAGNOSIS — F17.200 SMOKER: ICD-10-CM

## 2018-08-14 DIAGNOSIS — Z11.4 ENCOUNTER FOR SCREENING FOR HIV: ICD-10-CM

## 2018-08-14 DIAGNOSIS — F17.200 SMOKER: Primary | ICD-10-CM

## 2018-08-14 LAB
ALBUMIN SERPL BCP-MCNC: 4 G/DL
ALP SERPL-CCNC: 202 U/L
ALT SERPL W/O P-5'-P-CCNC: 62 U/L
ANION GAP SERPL CALC-SCNC: 8 MMOL/L
AST SERPL-CCNC: 47 U/L
BASOPHILS # BLD AUTO: 0.08 K/UL
BASOPHILS NFR BLD: 0.6 %
BILIRUB SERPL-MCNC: 0.4 MG/DL
BUN SERPL-MCNC: 15 MG/DL
CALCIUM SERPL-MCNC: 9.8 MG/DL
CHLORIDE SERPL-SCNC: 103 MMOL/L
CO2 SERPL-SCNC: 28 MMOL/L
CREAT SERPL-MCNC: 0.8 MG/DL
DIFFERENTIAL METHOD: ABNORMAL
EOSINOPHIL # BLD AUTO: 0.3 K/UL
EOSINOPHIL NFR BLD: 2.1 %
ERYTHROCYTE [DISTWIDTH] IN BLOOD BY AUTOMATED COUNT: 13 %
EST. GFR  (AFRICAN AMERICAN): >60 ML/MIN/1.73 M^2
EST. GFR  (NON AFRICAN AMERICAN): >60 ML/MIN/1.73 M^2
GLUCOSE SERPL-MCNC: 91 MG/DL
HCT VFR BLD AUTO: 44.7 %
HGB BLD-MCNC: 14.7 G/DL
LYMPHOCYTES # BLD AUTO: 3.9 K/UL
LYMPHOCYTES NFR BLD: 30.4 %
MCH RBC QN AUTO: 30.7 PG
MCHC RBC AUTO-ENTMCNC: 32.9 G/DL
MCV RBC AUTO: 93 FL
MONOCYTES # BLD AUTO: 1 K/UL
MONOCYTES NFR BLD: 8.1 %
NEUTROPHILS # BLD AUTO: 7.5 K/UL
NEUTROPHILS NFR BLD: 58.2 %
NRBC BLD-RTO: 0 /100 WBC
PLATELET # BLD AUTO: 254 K/UL
PMV BLD AUTO: 10.2 FL
POTASSIUM SERPL-SCNC: 4.9 MMOL/L
PROT SERPL-MCNC: 8 G/DL
RBC # BLD AUTO: 4.79 M/UL
SODIUM SERPL-SCNC: 139 MMOL/L
WBC # BLD AUTO: 12.89 K/UL

## 2018-08-14 PROCEDURE — 3008F BODY MASS INDEX DOCD: CPT | Mod: CPTII,S$GLB,, | Performed by: NURSE PRACTITIONER

## 2018-08-14 PROCEDURE — 36415 COLL VENOUS BLD VENIPUNCTURE: CPT

## 2018-08-14 PROCEDURE — 80074 ACUTE HEPATITIS PANEL: CPT

## 2018-08-14 PROCEDURE — 86703 HIV-1/HIV-2 1 RESULT ANTBDY: CPT

## 2018-08-14 PROCEDURE — 99203 OFFICE O/P NEW LOW 30 MIN: CPT | Mod: S$GLB,,, | Performed by: NURSE PRACTITIONER

## 2018-08-14 PROCEDURE — 71046 X-RAY EXAM CHEST 2 VIEWS: CPT | Mod: 26,,, | Performed by: RADIOLOGY

## 2018-08-14 PROCEDURE — 99999 PR PBB SHADOW E&M-EST. PATIENT-LVL IV: CPT | Mod: PBBFAC,,, | Performed by: NURSE PRACTITIONER

## 2018-08-14 PROCEDURE — 71046 X-RAY EXAM CHEST 2 VIEWS: CPT | Mod: TC

## 2018-08-14 PROCEDURE — 85025 COMPLETE CBC W/AUTO DIFF WBC: CPT

## 2018-08-14 PROCEDURE — 80053 COMPREHEN METABOLIC PANEL: CPT

## 2018-08-14 NOTE — PROGRESS NOTES
Subjective:       Patient ID: Anmol Angeles Jr. is a 48 y.o. male.    Chief Complaint: Arm Pain (Lump right armpit)    Mr. Angeles presents today for a lump in his right arm pit that he first noticed a few days ago.  He said it is tender to touch, and that that tenderness began yesterday.  He has smoked 1-2 packs daily since he was a teenager.  His record notes that he is a IV drug user.  When I asked about this, he denied any history of IVDU. He also reports that he does not have a seizure disorder and that he had 1 seizure, due to illness 2/2 fistula, and that he will never take an antiepileptic again, because those medications made him feel horrible.       Arm Pain    There was no injury mechanism. Pain location: right axilla. The pain is at a severity of 1/10. The pain is mild. The pain has been fluctuating since the incident. Pertinent negatives include no chest pain, muscle weakness, numbness or tingling. The symptoms are aggravated by palpation. He has tried nothing for the symptoms.     Review of Systems   Constitutional: Negative for fever.   HENT: Negative for facial swelling.    Eyes: Negative for visual disturbance.   Respiratory: Negative for shortness of breath.    Cardiovascular: Negative for chest pain.   Gastrointestinal: Negative for vomiting.   Genitourinary: Negative for dysuria.   Musculoskeletal: Negative for gait problem.   Skin: Negative for rash.   Neurological: Negative for tingling and numbness.   Psychiatric/Behavioral: Negative for confusion.       Objective:      Physical Exam   Constitutional: He is oriented to person, place, and time. He appears well-developed and well-nourished. No distress.   HENT:   Head: Atraumatic.   Eyes: No scleral icterus.   Cardiovascular: Normal rate, regular rhythm and normal heart sounds.   Pulmonary/Chest: Effort normal and breath sounds normal. No stridor. No respiratory distress. He has no wheezes. He has no rales.   Musculoskeletal: Normal range of  motion.   Lymphadenopathy:     He has no cervical adenopathy.     He has axillary adenopathy.        Right axillary: Lateral adenopathy present. No pectoral adenopathy present.        Left axillary: No pectoral and no lateral adenopathy present.       Right: No supraclavicular adenopathy present.        Left: No supraclavicular adenopathy present.   Neurological: He is alert and oriented to person, place, and time.   Skin: He is not diaphoretic.   Mild erythema just inferior to swollen axillary node.  No tenderness, increased warmth of the erythematic area. No sores appreciated.    Nursing note and vitals reviewed.      Assessment:       1. Smoker    2. Axillary lymphadenopathy    3. Encounter for screening for HIV        Plan:   1. Smoker  - X-Ray Chest PA And Lateral; Future  - CBC auto differential; Future  - Comprehensive metabolic panel; Future    2. Axillary lymphadenopathy  - X-Ray Chest PA And Lateral; Future  - CBC auto differential; Future  - Comprehensive metabolic panel; Future  - HIV-1 and HIV-2 antibodies; Future    3. Encounter for screening for HIV  - HIV-1 and HIV-2 antibodies; Future      Pt has been given instructions populated from [employer] database and has verbalized understanding of the after visit summary and information contained wherein.    Follow up with a primary care provider. May go to ER for acute shortness of breath, lightheadedness, fever, or any other emergent complaints or changes in condition.

## 2018-08-15 ENCOUNTER — TELEPHONE (OUTPATIENT)
Dept: INTERNAL MEDICINE | Facility: CLINIC | Age: 48
End: 2018-08-15

## 2018-08-15 DIAGNOSIS — R79.89 ELEVATED LFTS: Primary | ICD-10-CM

## 2018-08-15 DIAGNOSIS — R59.1 LYMPHADENOPATHY: ICD-10-CM

## 2018-08-15 LAB
HAV IGM SERPL QL IA: NEGATIVE
HBV CORE IGM SERPL QL IA: NEGATIVE
HBV SURFACE AG SERPL QL IA: NEGATIVE
HCV AB SERPL QL IA: NEGATIVE
HIV 1+2 AB+HIV1 P24 AG SERPL QL IA: NEGATIVE

## 2018-08-15 RX ORDER — SULFAMETHOXAZOLE AND TRIMETHOPRIM 800; 160 MG/1; MG/1
1 TABLET ORAL 2 TIMES DAILY
Qty: 20 TABLET | Refills: 0 | Status: SHIPPED | OUTPATIENT
Start: 2018-08-15 | End: 2018-09-05

## 2018-08-15 NOTE — TELEPHONE ENCOUNTER
----- Message from Sophie Benton sent at 8/15/2018  2:38 PM CDT -----  Contact: Pt Mobile/Home [phone]  Patient is returning a phone call.  Who left a message for the patient: Rubia Amor  Does patient know what this is regarding:  A message from Rubia Amor  Comments:

## 2018-08-15 NOTE — TELEPHONE ENCOUNTER
Review labs, area in axilla has grown. Will start bactrim for possible cellulitis, given erythema at site.   Discussed follow up hepatitis screening. Will call with results when available.

## 2018-08-16 ENCOUNTER — HOSPITAL ENCOUNTER (OUTPATIENT)
Dept: RADIOLOGY | Facility: HOSPITAL | Age: 48
Discharge: HOME | End: 2018-08-16
Attending: NURSE PRACTITIONER
Payer: MEDICARE

## 2018-08-16 DIAGNOSIS — R79.89 ELEVATED LFTS: ICD-10-CM

## 2018-08-16 DIAGNOSIS — R59.1 LYMPHADENOPATHY: ICD-10-CM

## 2018-08-16 PROCEDURE — 76700 US EXAM ABDOM COMPLETE: CPT | Mod: 26,,, | Performed by: RADIOLOGY

## 2018-08-16 PROCEDURE — 76700 US EXAM ABDOM COMPLETE: CPT | Mod: TC

## 2018-08-16 NOTE — TELEPHONE ENCOUNTER
Spoke with patient, informed of results, scheduled patient to see Dr. Tolbert to establish care. Patient expressed great understanding and had no problems.

## 2018-08-16 NOTE — TELEPHONE ENCOUNTER
Please let Mr Bridgette know that his ultrasound showed no problems with his liver. This is good news. He should stop all alcohol and have this level rechecked in 8 weeks.  Can you set him up with a PCP appointment?  with Dr Tolbert if that works out.

## 2018-09-05 ENCOUNTER — TELEPHONE (OUTPATIENT)
Dept: SURGERY | Facility: CLINIC | Age: 48
End: 2018-09-05

## 2018-09-05 ENCOUNTER — OFFICE VISIT (OUTPATIENT)
Dept: SURGERY | Facility: CLINIC | Age: 48
End: 2018-09-05
Payer: MEDICARE

## 2018-09-05 VITALS
BODY MASS INDEX: 23.68 KG/M2 | HEART RATE: 72 BPM | DIASTOLIC BLOOD PRESSURE: 71 MMHG | SYSTOLIC BLOOD PRESSURE: 115 MMHG | HEIGHT: 74 IN | WEIGHT: 184.5 LBS

## 2018-09-05 DIAGNOSIS — K60.3 TRANSSPHINCTERIC ANAL FISTULA: Primary | ICD-10-CM

## 2018-09-05 DIAGNOSIS — K60.3 FISTULA-IN-ANO: Primary | ICD-10-CM

## 2018-09-05 PROCEDURE — 3008F BODY MASS INDEX DOCD: CPT | Mod: CPTII,,, | Performed by: COLON & RECTAL SURGERY

## 2018-09-05 PROCEDURE — 99999 PR PBB SHADOW E&M-EST. PATIENT-LVL III: CPT | Mod: PBBFAC,,, | Performed by: COLON & RECTAL SURGERY

## 2018-09-05 PROCEDURE — 99213 OFFICE O/P EST LOW 20 MIN: CPT | Mod: PBBFAC | Performed by: COLON & RECTAL SURGERY

## 2018-09-05 PROCEDURE — 99213 OFFICE O/P EST LOW 20 MIN: CPT | Mod: S$PBB,,, | Performed by: COLON & RECTAL SURGERY

## 2018-09-05 RX ORDER — MOXIFLOXACIN HYDROCHLORIDE 400 MG/1
400 TABLET ORAL DAILY
Qty: 14 TABLET | Refills: 0 | Status: SHIPPED | OUTPATIENT
Start: 2018-09-05 | End: 2018-09-19

## 2018-09-05 NOTE — TELEPHONE ENCOUNTER
----- Message from Trudy Ortiz sent at 9/5/2018  9:08 AM CDT -----  Contact: Self- [phone]  Storm- pt called to speak with Chloe- states he's having issues with his fistula again- please contact pt at [phone]

## 2018-09-05 NOTE — PROGRESS NOTES
HPI:  Anmol Angeles Jr. is a 48 y.o. male with history of    Complex fistula in ANO which was treated in the past with partial fistulotomy and fistula plug insertion.      He reports a one-day history of anal pressure and tingling.  No severe pain.  Possible discharge.       Past Medical History:   Diagnosis Date    Perirectal fistula     Seizures         Past Surgical History:   Procedure Laterality Date    BACK SURGERY      fistula surgery      LEG SURGERY      plate    LEG SURGERY Right        Review of patient's allergies indicates:   Allergen Reactions    Latex, natural rubber        No family history on file.    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  Integument: No rashes, redness, icterus  CHEST: Denies DILLON, cyanosis, wheezing, cough and sputum production.  CARDIOVASCULAR: Denies chest pain, PND, orthopnea or reduced exercise tolerance.  GI: Denies abd pain, dysphagia, nausea, vomiting, no hematemesis   : Denies burning on urination, no hematuria, no bacteriuria  MSK: No deformities, swelling, joint pain  "swelling  Neurologic: No HAs, seizures, weakness, paresthesias, gait problems    PE:  General appearance  nontoxic  /71 (BP Location: Left arm, Patient Position: Sitting, BP Method: Large (Automatic))   Pulse 72   Ht 6' 2" (1.88 m)   Wt 83.7 kg (184 lb 8.4 oz)   BMI 23.69 kg/m²   Sclera/ Skin anicteric  AT NC EOMI  Neck supple trachea midline   Chest symmetric, nl excursion, no retractions, breathing comfortablyy  EXT - no CCE  Neuro:  Mood/ affect nl, alert and oriented x 3, moves all ext's, gait nl    Rectal       external opening, right posterior.  No mass effect.  No fluctuance.  No significant tenderness or erythema.    Assessment:   possible recurrent fistula versus sinus tract.  No evidence of undrained abscess    Plan:   Begin antibiotics   local wound care   MRI pelvis   office visit 2-3 weeks    "

## 2018-09-05 NOTE — TELEPHONE ENCOUNTER
Pt says he is having drainage and pain from area of previous fistula. Appt made for him to see Dr Inman today.

## 2018-09-18 ENCOUNTER — HOSPITAL ENCOUNTER (OUTPATIENT)
Dept: RADIOLOGY | Facility: HOSPITAL | Age: 48
Discharge: HOME | End: 2018-09-18
Attending: COLON & RECTAL SURGERY
Payer: MEDICARE

## 2018-09-18 DIAGNOSIS — K60.3 FISTULA-IN-ANO: ICD-10-CM

## 2018-09-18 PROCEDURE — 72197 MRI PELVIS W/O & W/DYE: CPT | Mod: TC

## 2018-09-18 PROCEDURE — 25500020 PHARM REV CODE 255: Performed by: COLON & RECTAL SURGERY

## 2018-09-18 PROCEDURE — 72197 MRI PELVIS W/O & W/DYE: CPT | Mod: 26,,, | Performed by: RADIOLOGY

## 2018-09-18 PROCEDURE — A9585 GADOBUTROL INJECTION: HCPCS | Performed by: COLON & RECTAL SURGERY

## 2018-09-18 RX ORDER — GADOBUTROL 604.72 MG/ML
9 INJECTION INTRAVENOUS
Status: COMPLETED | OUTPATIENT
Start: 2018-09-18 | End: 2018-09-18

## 2018-09-18 RX ADMIN — GADOBUTROL 9 ML: 604.72 INJECTION INTRAVENOUS at 12:09

## 2018-09-19 ENCOUNTER — OFFICE VISIT (OUTPATIENT)
Dept: SURGERY | Facility: CLINIC | Age: 48
End: 2018-09-19
Payer: MEDICARE

## 2018-09-19 VITALS
BODY MASS INDEX: 23.91 KG/M2 | HEIGHT: 74 IN | HEART RATE: 76 BPM | WEIGHT: 186.31 LBS | DIASTOLIC BLOOD PRESSURE: 77 MMHG | SYSTOLIC BLOOD PRESSURE: 135 MMHG

## 2018-09-19 DIAGNOSIS — K60.3 TRANSSPHINCTERIC ANAL FISTULA: Primary | ICD-10-CM

## 2018-09-19 PROCEDURE — 99999 PR PBB SHADOW E&M-EST. PATIENT-LVL III: CPT | Mod: PBBFAC,,, | Performed by: COLON & RECTAL SURGERY

## 2018-09-19 PROCEDURE — 99213 OFFICE O/P EST LOW 20 MIN: CPT | Mod: PBBFAC | Performed by: COLON & RECTAL SURGERY

## 2018-09-19 PROCEDURE — 99214 OFFICE O/P EST MOD 30 MIN: CPT | Mod: S$PBB,,, | Performed by: COLON & RECTAL SURGERY

## 2018-09-19 PROCEDURE — 3008F BODY MASS INDEX DOCD: CPT | Mod: CPTII,,, | Performed by: COLON & RECTAL SURGERY

## 2018-09-19 NOTE — H&P (VIEW-ONLY)
HPI:  Anmol Angeles Jr. is a 48 y.o. male with history of complex, transsphincteric fistula in ANO.  He reports that since taking the antibiotics the drainage to stop.  However he, he does have some abnormal sensations around the anal area.  He denies severe pain.       Past Medical History:   Diagnosis Date    Perirectal fistula     Seizures         Past Surgical History:   Procedure Laterality Date    BACK SURGERY      COLONOSCOPY N/A 8/22/2016    Procedure: COLONOSCOPY;  Surgeon: TROY Inman MD;  Location: [facility] ENDO (4TH FLR);  Service: Endoscopy;  Laterality: N/A;    COLONOSCOPY N/A 8/22/2016    Performed by TROY Inman MD at [facility] ENDO (4TH FLR)    EXAM UNDER ANESTHESIA N/A 6/9/2017    Performed by [facility]OY [facility]man MD at [facility] OR 2ND FLR    EXAM UNDER ANESTHESIA N/A 7/29/2016    [facility]    EXAM UNDER ANESTHESIA N/A 10/9/2014    [facility]    EXAM UNDER ANESTHESIA Fistula plug N/A 10/6/2016    [facility]    EXAM UNDER ANESTHESIA with drainage of complex fistula and seton insertion N/A 12/22/2016    Performed by TROY Inman MD at [facility] OR [address] [place] [address]    fistula surgery      FISTULECTOMY  6/9/2017    Performed by TROY Inman MD at [facility] OR [address] [place] [address]    FISTULOTOMY  10/6/2016    Performed by TROY Inman MD at [facility] OR [address] [place] [address]    INCISION AND DRAINAGE (I & D) external fistula cacity N/A 10/6/2016    Performed by TROY Inman MD at [facility] OR [address] [place] [address]    INSERTION-CATHETER, possible seton catheter N/A 10/9/2014    Performed by TORY Inman MD at [facility] OR [address] [place] [address]    LEG SURGERY      plate    LEG SURGERY Right     PLACEMENT-ANAL PLUG  6/9/2017    Performed by TROY [facility]man MD at [facility] OR [address] [place] [address]    PLACEMENT-SETON DRAIN N/A 7/29/2016    Performed by TROY Inman MD at [facility] OR [address] [place] [address]       Review of patient's allergies indicates:   Allergen Reactions     Latex, natural rubber        No family history on file.    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  Integument: No rashes, redness, icterus  CHEST: Denies DILLON, cyanosis, wheezing, cough and sputum production.  CARDIOVASCULAR: Denies chest pain, PND, orthopnea or reduced exercise tolerance.  GI: Denies abd pain, dysphagia, nausea, vomiting, no hematemesis   : Denies burning on urination, no hematuria, no bacteriuria  MSK: No deformities, swelling, joint pain swelling  Neurologic: No HAs, seizures, weakness, paresthesias, gait problems    PE:  General appearance well  /77 (BP Location: Right arm, Patient Position: Sitting, BP Method: Large (Automatic))   Pulse 76   Sclera/ Skin anicteric  AT NC EOMI  Neck supple trachea midline   Chest symmetric, nl excursion, no retractions, breathing comfortably  EXT - no CCE  Neuro:  Mood/ affect nl, alert and oriented x 3, moves all ext's, gait nl    Rectal  Inspection       Right posterior scar.   Minimal tenderness, induration.  No fluctuance.  No erythema      Assessment:  Possible recurrent fistula on MRI    Plan:  Discussed with pt at length.  Given his recurrent d/c and MRI findings, suspect recurrent fistula  Recommend exam under anesthesia, possible seton insertion.

## 2018-09-19 NOTE — PROGRESS NOTES
HPI:  Anmol Angeles Jr. is a 48 y.o. male with history of complex, transsphincteric fistula in ANO.  He reports that since taking the antibiotics the drainage to stop.  However he, he does have some abnormal sensations around the anal area.  He denies severe pain.       Past Medical History:   Diagnosis Date    Perirectal fistula     Seizures         Past Surgical History:   Procedure Laterality Date    BACK SURGERY      COLONOSCOPY N/A 8/22/2016    Procedure: COLONOSCOPY;  Surgeon: TROY Inman MD;  Location: [facility] ENDO (4TH FLR);  Service: Endoscopy;  Laterality: N/A;    COLONOSCOPY N/A 8/22/2016    Performed by TROY Inman MD at [facility] ENDO (4TH FLR)    EXAM UNDER ANESTHESIA N/A 6/9/2017    Performed by [facility] MD at [facility] OR 2ND FLR    EXAM UNDER ANESTHESIA N/A 7/29/2016    [facility]    EXAM UNDER ANESTHESIA N/A 10/9/2014    [facility]    EXAM UNDER ANESTHESIA Fistula plug N/A 10/6/2016    [facility]    EXAM UNDER ANESTHESIA with drainage of complex fistula and seton insertion N/A 12/22/2016    Performed by TROY [facility]man MD at [facility] OR [address] [place] [address]    fistula surgery      FISTULECTOMY  6/9/2017    Performed by [facility]man MD at [facility] [place] [address] [place] [address]    FISTULOTOMY  10/6/2016    Performed by [facility]man MD at [facility] OR [address] [place] [address]    INCISION AND DRAINAGE (I & D) external fistula cacity N/A 10/6/2016    Performed by [facility]man MD at [facility] [place] [address] [place] [address]    INSERTION-CATHETER, possible seton catheter N/A 10/9/2014    Performed by [facility]man MD at [facility] [place] [address] [place] [address]    LEG SURGERY      plate    LEG SURGERY Right     PLACEMENT-ANAL PLUG  6/9/2017    Performed by [facility] at [facility] [place] [address] [place] [address]    PLACEMENT-SETON DRAIN N/A 7/29/2016    Performed by [facility]man MD at [facility] [place] [address] [place] [address]       Review of patient's allergies indicates:   Allergen Reactions     Latex, natural rubber        No family history on file.    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  Integument: No rashes, redness, icterus  CHEST: Denies DILLON, cyanosis, wheezing, cough and sputum production.  CARDIOVASCULAR: Denies chest pain, PND, orthopnea or reduced exercise tolerance.  GI: Denies abd pain, dysphagia, nausea, vomiting, no hematemesis   : Denies burning on urination, no hematuria, no bacteriuria  MSK: No deformities, swelling, joint pain swelling  Neurologic: No HAs, seizures, weakness, paresthesias, gait problems    PE:  General appearance well  /77 (BP Location: Right arm, Patient Position: Sitting, BP Method: Large (Automatic))   Pulse 76   Sclera/ Skin anicteric  AT NC EOMI  Neck supple trachea midline   Chest symmetric, nl excursion, no retractions, breathing comfortably  EXT - no CCE  Neuro:  Mood/ affect nl, alert and oriented x 3, moves all ext's, gait nl    Rectal  Inspection       Right posterior scar.   Minimal tenderness, induration.  No fluctuance.  No erythema      Assessment:  Possible recurrent fistula on MRI    Plan:  Discussed with pt at length.  Given his recurrent d/c and MRI findings, suspect recurrent fistula  Recommend exam under anesthesia, possible seton insertion.

## 2018-09-21 DIAGNOSIS — K60.3 TRANSSPHINCTERIC ANAL FISTULA: Primary | ICD-10-CM

## 2018-09-21 RX ORDER — MUPIROCIN 20 MG/G
OINTMENT TOPICAL
Status: CANCELLED | OUTPATIENT
Start: 2018-09-21

## 2018-09-21 RX ORDER — HEPARIN SODIUM 5000 [USP'U]/ML
5000 INJECTION, SOLUTION INTRAVENOUS; SUBCUTANEOUS
Status: CANCELLED | OUTPATIENT
Start: 2018-09-21

## 2018-09-21 RX ORDER — SODIUM CHLORIDE 9 MG/ML
INJECTION, SOLUTION INTRAVENOUS CONTINUOUS
Status: CANCELLED | OUTPATIENT
Start: 2018-09-21

## 2018-09-21 RX ORDER — ACETAMINOPHEN 10 MG/ML
1000 INJECTION, SOLUTION INTRAVENOUS
Status: CANCELLED | OUTPATIENT
Start: 2018-09-21 | End: 2018-09-21

## 2018-10-12 ENCOUNTER — HOSPITAL ENCOUNTER (OUTPATIENT)
Facility: HOSPITAL | Age: 48
Discharge: HOME | End: 2018-10-12
Attending: COLON & RECTAL SURGERY | Admitting: COLON & RECTAL SURGERY
Payer: MEDICARE

## 2018-10-12 ENCOUNTER — ANESTHESIA (OUTPATIENT)
Dept: SURGERY | Facility: HOSPITAL | Age: 48
End: 2018-10-12
Payer: MEDICARE

## 2018-10-12 ENCOUNTER — ANESTHESIA EVENT (OUTPATIENT)
Dept: SURGERY | Facility: HOSPITAL | Age: 48
End: 2018-10-12
Payer: MEDICARE

## 2018-10-12 VITALS
HEART RATE: 70 BPM | WEIGHT: 185 LBS | TEMPERATURE: 98 F | HEIGHT: 74 IN | RESPIRATION RATE: 16 BRPM | BODY MASS INDEX: 23.74 KG/M2 | OXYGEN SATURATION: 100 % | SYSTOLIC BLOOD PRESSURE: 121 MMHG | DIASTOLIC BLOOD PRESSURE: 88 MMHG

## 2018-10-12 DIAGNOSIS — K60.3 ANAL FISTULA: Primary | ICD-10-CM

## 2018-10-12 DIAGNOSIS — K60.3 TRANSSPHINCTERIC ANAL FISTULA: ICD-10-CM

## 2018-10-12 PROBLEM — K60.329 TRANSSPHINCTERIC ANAL FISTULA: Status: ACTIVE | Noted: 2018-10-12

## 2018-10-12 PROCEDURE — 71000044 HC DOSC ROUTINE RECOVERY FIRST HOUR: Performed by: COLON & RECTAL SURGERY

## 2018-10-12 PROCEDURE — 36000704 HC OR TIME LEV I 1ST 15 MIN: Performed by: COLON & RECTAL SURGERY

## 2018-10-12 PROCEDURE — 71000015 HC POSTOP RECOV 1ST HR: Performed by: COLON & RECTAL SURGERY

## 2018-10-12 PROCEDURE — 25000003 PHARM REV CODE 250: Performed by: COLON & RECTAL SURGERY

## 2018-10-12 PROCEDURE — S0020 INJECTION, BUPIVICAINE HYDRO: HCPCS | Performed by: COLON & RECTAL SURGERY

## 2018-10-12 PROCEDURE — 25000003 PHARM REV CODE 250: Performed by: NURSE PRACTITIONER

## 2018-10-12 PROCEDURE — 37000008 HC ANESTHESIA 1ST 15 MINUTES: Performed by: COLON & RECTAL SURGERY

## 2018-10-12 PROCEDURE — D9220A PRA ANESTHESIA: Mod: ANES,,, | Performed by: ANESTHESIOLOGY

## 2018-10-12 PROCEDURE — 46020 PLACEMENT OF SETON: CPT | Mod: ,,, | Performed by: COLON & RECTAL SURGERY

## 2018-10-12 PROCEDURE — 63600175 PHARM REV CODE 636 W HCPCS: Performed by: NURSE PRACTITIONER

## 2018-10-12 PROCEDURE — 63600175 PHARM REV CODE 636 W HCPCS: Performed by: NURSE ANESTHETIST, CERTIFIED REGISTERED

## 2018-10-12 PROCEDURE — 25000003 PHARM REV CODE 250: Performed by: NURSE ANESTHETIST, CERTIFIED REGISTERED

## 2018-10-12 PROCEDURE — 36000705 HC OR TIME LEV I EA ADD 15 MIN: Performed by: COLON & RECTAL SURGERY

## 2018-10-12 PROCEDURE — 37000009 HC ANESTHESIA EA ADD 15 MINS: Performed by: COLON & RECTAL SURGERY

## 2018-10-12 PROCEDURE — D9220A PRA ANESTHESIA: Mod: CRNA,,, | Performed by: NURSE ANESTHETIST, CERTIFIED REGISTERED

## 2018-10-12 RX ORDER — ONDANSETRON 2 MG/ML
INJECTION INTRAMUSCULAR; INTRAVENOUS
Status: DISCONTINUED | OUTPATIENT
Start: 2018-10-12 | End: 2018-10-12

## 2018-10-12 RX ORDER — PROPOFOL 10 MG/ML
VIAL (ML) INTRAVENOUS
Status: DISCONTINUED | OUTPATIENT
Start: 2018-10-12 | End: 2018-10-12

## 2018-10-12 RX ORDER — KETAMINE HCL IN 0.9 % NACL 50 MG/5 ML
SYRINGE (ML) INTRAVENOUS
Status: DISCONTINUED | OUTPATIENT
Start: 2018-10-12 | End: 2018-10-12

## 2018-10-12 RX ORDER — BUPIVACAINE HYDROCHLORIDE 5 MG/ML
INJECTION, SOLUTION EPIDURAL; INTRACAUDAL
Status: DISCONTINUED | OUTPATIENT
Start: 2018-10-12 | End: 2018-10-12 | Stop reason: HOSPADM

## 2018-10-12 RX ORDER — GLYCOPYRROLATE 0.2 MG/ML
INJECTION INTRAMUSCULAR; INTRAVENOUS
Status: DISCONTINUED | OUTPATIENT
Start: 2018-10-12 | End: 2018-10-12

## 2018-10-12 RX ORDER — PROPOFOL 10 MG/ML
VIAL (ML) INTRAVENOUS CONTINUOUS PRN
Status: DISCONTINUED | OUTPATIENT
Start: 2018-10-12 | End: 2018-10-12

## 2018-10-12 RX ORDER — OXYCODONE AND ACETAMINOPHEN 5; 325 MG/1; MG/1
1 TABLET ORAL EVERY 4 HOURS PRN
Qty: 30 TABLET | Refills: 0 | Status: SHIPPED | OUTPATIENT
Start: 2018-10-12 | End: 2018-12-11

## 2018-10-12 RX ORDER — SODIUM CHLORIDE 9 MG/ML
INJECTION, SOLUTION INTRAVENOUS CONTINUOUS
Status: DISCONTINUED | OUTPATIENT
Start: 2018-10-12 | End: 2018-10-12 | Stop reason: HOSPADM

## 2018-10-12 RX ORDER — FENTANYL CITRATE 50 UG/ML
INJECTION, SOLUTION INTRAMUSCULAR; INTRAVENOUS
Status: DISCONTINUED | OUTPATIENT
Start: 2018-10-12 | End: 2018-10-12

## 2018-10-12 RX ORDER — LIDOCAINE HCL/PF 100 MG/5ML
SYRINGE (ML) INTRAVENOUS
Status: DISCONTINUED | OUTPATIENT
Start: 2018-10-12 | End: 2018-10-12

## 2018-10-12 RX ORDER — MUPIROCIN 20 MG/G
OINTMENT TOPICAL
Status: DISCONTINUED | OUTPATIENT
Start: 2018-10-12 | End: 2018-10-12 | Stop reason: HOSPADM

## 2018-10-12 RX ORDER — OXYCODONE AND ACETAMINOPHEN 5; 325 MG/1; MG/1
1 TABLET ORAL EVERY 4 HOURS PRN
Status: DISCONTINUED | OUTPATIENT
Start: 2018-10-12 | End: 2018-10-12 | Stop reason: HOSPADM

## 2018-10-12 RX ORDER — MIDAZOLAM HYDROCHLORIDE 1 MG/ML
INJECTION INTRAMUSCULAR; INTRAVENOUS
Status: DISCONTINUED | OUTPATIENT
Start: 2018-10-12 | End: 2018-10-12

## 2018-10-12 RX ORDER — ACETAMINOPHEN 10 MG/ML
1000 INJECTION, SOLUTION INTRAVENOUS
Status: COMPLETED | OUTPATIENT
Start: 2018-10-12 | End: 2018-10-12

## 2018-10-12 RX ORDER — LIDOCAINE HYDROCHLORIDE AND EPINEPHRINE 15; 5 MG/ML; UG/ML
INJECTION, SOLUTION EPIDURAL
Status: DISCONTINUED | OUTPATIENT
Start: 2018-10-12 | End: 2018-10-12 | Stop reason: HOSPADM

## 2018-10-12 RX ORDER — HEPARIN SODIUM 5000 [USP'U]/ML
5000 INJECTION, SOLUTION INTRAVENOUS; SUBCUTANEOUS
Status: COMPLETED | OUTPATIENT
Start: 2018-10-12 | End: 2018-10-12

## 2018-10-12 RX ADMIN — PROPOFOL 50 MG: 10 INJECTION, EMULSION INTRAVENOUS at 07:10

## 2018-10-12 RX ADMIN — MUPIROCIN: 20 OINTMENT TOPICAL at 05:10

## 2018-10-12 RX ADMIN — Medication 20 MG: at 07:10

## 2018-10-12 RX ADMIN — ACETAMINOPHEN 1000 MG: 10 INJECTION, SOLUTION INTRAVENOUS at 05:10

## 2018-10-12 RX ADMIN — FENTANYL CITRATE 25 MCG: 50 INJECTION, SOLUTION INTRAMUSCULAR; INTRAVENOUS at 07:10

## 2018-10-12 RX ADMIN — IBUPROFEN 800 MG: 800 INJECTION INTRAVENOUS at 06:10

## 2018-10-12 RX ADMIN — PROPOFOL 200 MCG/KG/MIN: 10 INJECTION, EMULSION INTRAVENOUS at 07:10

## 2018-10-12 RX ADMIN — HEPARIN SODIUM 5000 UNITS: 5000 INJECTION, SOLUTION INTRAVENOUS; SUBCUTANEOUS at 06:10

## 2018-10-12 RX ADMIN — GLYCOPYRROLATE 0.2 MG: 0.2 INJECTION, SOLUTION INTRAMUSCULAR; INTRAVENOUS at 07:10

## 2018-10-12 RX ADMIN — LIDOCAINE HYDROCHLORIDE 75 MG: 20 INJECTION, SOLUTION INTRAVENOUS at 07:10

## 2018-10-12 RX ADMIN — SODIUM CHLORIDE: 0.9 INJECTION, SOLUTION INTRAVENOUS at 05:10

## 2018-10-12 RX ADMIN — ONDANSETRON 4 MG: 2 INJECTION INTRAMUSCULAR; INTRAVENOUS at 07:10

## 2018-10-12 RX ADMIN — MIDAZOLAM HYDROCHLORIDE 2 MG: 1 INJECTION, SOLUTION INTRAMUSCULAR; INTRAVENOUS at 07:10

## 2018-10-12 NOTE — DISCHARGE INSTRUCTIONS

## 2018-10-12 NOTE — ANESTHESIA PREPROCEDURE EVALUATION
10/12/2018  Anmol Angeles Jr. is a 48 y.o., male.    Anesthesia Evaluation         Review of Systems  Anesthesia Hx:  No problems with previous Anesthesia   Social:  Smoker, Alcohol Use Marijuana, IVDU (intravenous drug user)   Cardiovascular:   Exercise tolerance: good    Neurological:   Headaches Seizures    Psych:   anxiety depression          Physical Exam  General:  Well nourished    Airway/Jaw/Neck:  Airway Findings: Mouth Opening: Normal Tongue: Normal  General Airway Assessment: Adult  Mallampati: II  Improves to II with phonation.  TM Distance: Normal, at least 6 cm  Jaw/Neck Findings:  Neck ROM: Normal ROM      Dental:  Dental Findings: In tact   Chest/Lungs:  Chest/Lungs Findings: Clear to auscultation, Normal Respiratory Rate     Heart/Vascular:  Heart Findings: Rate: Normal  Rhythm: Regular Rhythm  Sounds: Normal        Mental Status:  Mental Status Findings:  Cooperative, Alert and Oriented         Anesthesia Plan  Type of Anesthesia, risks & benefits discussed:  Anesthesia Type:  general  Patient's Preference:   Intra-op Monitoring Plan: standard ASA monitors  Intra-op Monitoring Plan Comments:   Post Op Pain Control Plan:   Post Op Pain Control Plan Comments:   Induction:   IV  Beta Blocker:  Patient is not currently on a Beta-Blocker (No further documentation required).       Informed Consent: Patient understands risks and agrees with Anesthesia plan.  Questions answered. Anesthesia consent signed with patient.  ASA Score: 2     Day of Surgery Review of History & Physical:    H&P update referred to the surgeon.         Ready For Surgery From Anesthesia Perspective.

## 2018-10-12 NOTE — OP NOTE
DATE OF PROCEDURE:  10/12/2018    SURGEON:  TROY Inman M.D.    ASSISTANT:  Calulm Pena M.D. (RES).    PREOPERATIVE DIAGNOSIS:  Transsphincteric perianal fistula.    POSTOPERATIVE DIAGNOSIS:  Transsphincteric perianal fistula.    OPERATION PERFORMED:  Examination under anesthesia, placement of seton.    ANESTHESIA:  MAC plus local.    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  The patient has had multiple   operations including a fistula plug, but continued to have drainage, which is   managed to control with antibiotics.  He presented for persistent fistula   management.    DETAILED DESCRIPTION OF PROCEDURE:  The patient was identified in the   preoperative holding area as Anmol Angeles and was taken back to the Operating   Room.  He was placed on the operating room table and after induction of   anesthesia, he was placed in the high lithotomy position and his anus and   perineum were prepped and draped in the usual sterile fashion.  At this point, a   timeout was called with all agreeing on the correct patient, site, and   procedure.  An anal block was performed using a combination of lidocaine and   bupivacaine and the patient's fistula tract was identified in the right   posterior lateral anus just beyond the edge of the  anal musculature.    Local anesthetic was injected posterior to this fistula tract as well.  At this   point, a digital rectal examination was performed revealing some pitting on the   right posterior side of the anus consistent with internal opening of fistula.    Anal retractors were gently placed within the anus and hydrogen peroxide was   injected into the external opening of the fistula confirming the position of the   internal opening, which was noted to be right posterior lateral just proximal   to the dentate line.  At this point, a fistula probe was inserted into the   external opening and was  carefully passed through to the internal opening,   taking care to prevent any excess pressure as so cause a false tract.  Once the   fistula probe was passed through, a vessel loop was placed through the probes eye  and was pulled through to create a seton.  The seton was overlapped in a manner to not cause excessive pressure on the track and was secured using 2-0 Ethibond sutures.  At this point, curettes were used   to clean out the fistula tract and it was copiously irrigated.  Hemostasis was   noted to be present and the procedure was brought to an end.  The patient was   returned to a prone position and moved to a stretcher from which he was taken to   the PACU for recovery.  All sponge and instrument counts were correct x2.  Dr. Inman was present and scrubbed for the entire operation.    DICTATED BY:  Callum Pena M.D. (RES)      KASANDRA/IN  dd: 10/12/2018 16:28:35 (CDT)  td: 10/12/2018 16:56:09 (CDT)  Doc ID   #[id number]  Job ID #[id number]    CC:

## 2018-10-12 NOTE — TRANSFER OF CARE
"Anesthesia Transfer of Care Note    Patient: Anmol Angeles Jr.    Procedure(s) Performed: Procedure(s) (LRB):  Exam under anesthesia (N/A)  PLACEMENT, SETON STITCH (N/A)    Patient location: [facility]    Anesthesia Type: general    Transport from OR: Transported from OR on 6-10 L/min O2 by face mask with adequate spontaneous ventilation    Post pain: adequate analgesia    Post assessment: no apparent anesthetic complications and tolerated procedure well    Post vital signs: stable    Level of consciousness: awake    Nausea/Vomiting: no nausea/vomiting    Complications: none    Transfer of care protocol was followed      Last vitals:   Visit Vitals  BP (!) 92/51 (BP Location: Left arm, Patient Position: Lying)   Pulse 78   Temp 36.5 °C (97.7 °F) (Skin)   Resp 16   Ht 6' 2" (1.88 m)   Wt 83.9 kg (185 lb)   SpO2 100%   BMI 23.75 kg/m²     "

## 2018-10-12 NOTE — INTERVAL H&P NOTE
The patient has been examined and the H&P has been reviewed:    I concur with the findings and no changes have occurred since H&P was written.    Anesthesia/Surgery risks, benefits and alternative options discussed and understood by patient/family.          Active Hospital Problems    Diagnosis  POA    Transsphincteric anal fistula [K60.3]  Yes      Resolved Hospital Problems   No resolved problems to display.

## 2018-10-12 NOTE — BRIEF OP NOTE
[facility]  Brief Operative Note     SUMMARY     Surgery Date: 10/12/2018     Surgeon(s) and Role:     * TROY Inman MD - Primary    Assisting Surgeon: None    Pre-op Diagnosis:  Transsphincteric anal fistula [K60.3]    Post-op Diagnosis:  Post-Op Diagnosis Codes:     * Transsphincteric anal fistula [K60.3]    Procedure(s) (LRB):  Exam under anesthesia (N/A)  PLACEMENT, SETON STITCH (N/A)    Anesthesia: General    Technical procedure: examination under anesthesia, seton placement, curettage of fistula tract    Findings/Key Components: transsphincteric fistula, right posterolateral with internal opening proximal to dentate line     Estimated Blood Loss: * No values recorded between 10/12/2018  7:22 AM and 10/12/2018  7:45 AM *         Specimens:   Specimen (12h ago, onward)    None          Discharge Note    SUMMARY     Admit Date: 10/12/2018    Discharge Date and Time:  10/12/2018 7:46 AM    Hospital Course (synopsis of major diagnoses, care, treatment, and services provided during the course of the hospital stay): Anmol Angeles Jr. is a 48 y.o. male with history of complex fistula in ano. He was taken to the OR and had a seton placed. He recovered in PACU and was discharged home in stable condition     Final Diagnosis: Post-Op Diagnosis Codes:     * Transsphincteric anal fistula [K60.3]    Disposition: Home or Self Care    Follow Up/Patient Instructions: f/u with Dr. Inman in 3 weeks    Medications:  Reconciled Home Medications:      Medication List      You have not been prescribed any medications.       No discharge procedures on file.

## 2018-10-15 NOTE — ANESTHESIA POSTPROCEDURE EVALUATION
"Anesthesia Post Evaluation    Patient: Anmol Angeles Jr.    Procedure(s) Performed: Procedure(s) (LRB):  Exam under anesthesia (N/A)  PLACEMENT, SETON STITCH (N/A)    Final Anesthesia Type: general  Patient location during evaluation: PACU  Patient participation: Yes- Able to Participate  Level of consciousness: awake and alert  Post-procedure vital signs: reviewed and stable  Pain management: adequate  Airway patency: patent  PONV status at discharge: No PONV  Anesthetic complications: no      Cardiovascular status: blood pressure returned to baseline  Respiratory status: unassisted  Hydration status: euvolemic  Follow-up not needed.        Visit Vitals  /88   Pulse 70   Temp 36.5 °C (97.7 °F) (Temporal)   Resp 16   Ht 6' 2" (1.88 m)   Wt 83.9 kg (185 lb)   SpO2 100%   BMI 23.75 kg/m²       Pain/Myles Score: No Data Recorded      "

## 2018-12-11 ENCOUNTER — OFFICE VISIT (OUTPATIENT)
Dept: INTERNAL MEDICINE | Facility: CLINIC | Age: 48
End: 2018-12-11
Payer: MEDICARE

## 2018-12-11 VITALS
OXYGEN SATURATION: 98 % | WEIGHT: 192.88 LBS | HEIGHT: 74 IN | SYSTOLIC BLOOD PRESSURE: 136 MMHG | BODY MASS INDEX: 24.75 KG/M2 | HEART RATE: 62 BPM | DIASTOLIC BLOOD PRESSURE: 86 MMHG

## 2018-12-11 DIAGNOSIS — G89.29 CHRONIC NECK PAIN: ICD-10-CM

## 2018-12-11 DIAGNOSIS — Z13.220 LIPID SCREENING: ICD-10-CM

## 2018-12-11 DIAGNOSIS — G40.909 SEIZURE DISORDER: ICD-10-CM

## 2018-12-11 DIAGNOSIS — Z76.89 ESTABLISHING CARE WITH NEW DOCTOR, ENCOUNTER FOR: Primary | ICD-10-CM

## 2018-12-11 DIAGNOSIS — R03.0 PREHYPERTENSION: ICD-10-CM

## 2018-12-11 DIAGNOSIS — Z13.29 SCREENING FOR THYROID DISORDER: ICD-10-CM

## 2018-12-11 DIAGNOSIS — F41.8 DEPRESSION WITH ANXIETY: ICD-10-CM

## 2018-12-11 DIAGNOSIS — R79.89 ELEVATED LFTS: ICD-10-CM

## 2018-12-11 DIAGNOSIS — K60.3 TRANSSPHINCTERIC ANAL FISTULA: ICD-10-CM

## 2018-12-11 DIAGNOSIS — D72.829 LEUKOCYTOSIS, UNSPECIFIED TYPE: ICD-10-CM

## 2018-12-11 DIAGNOSIS — Z72.0 TOBACCO USE: ICD-10-CM

## 2018-12-11 DIAGNOSIS — M54.50 CHRONIC BILATERAL LOW BACK PAIN WITHOUT SCIATICA: ICD-10-CM

## 2018-12-11 DIAGNOSIS — R79.9 ABNORMAL FINDING OF BLOOD CHEMISTRY: ICD-10-CM

## 2018-12-11 DIAGNOSIS — G89.29 CHRONIC BILATERAL LOW BACK PAIN WITHOUT SCIATICA: ICD-10-CM

## 2018-12-11 DIAGNOSIS — M54.2 CHRONIC NECK PAIN: ICD-10-CM

## 2018-12-11 PROCEDURE — 99214 OFFICE O/P EST MOD 30 MIN: CPT | Mod: S$GLB,,, | Performed by: INTERNAL MEDICINE

## 2018-12-11 PROCEDURE — 3008F BODY MASS INDEX DOCD: CPT | Mod: CPTII,S$GLB,, | Performed by: INTERNAL MEDICINE

## 2018-12-11 PROCEDURE — 99999 PR PBB SHADOW E&M-EST. PATIENT-LVL IV: CPT | Mod: PBBFAC,,, | Performed by: INTERNAL MEDICINE

## 2018-12-11 RX ORDER — IBUPROFEN 800 MG/1
800 TABLET ORAL DAILY PRN
Qty: 90 TABLET | Refills: 0 | Status: SHIPPED | OUTPATIENT
Start: 2018-12-11 | End: 2019-09-09 | Stop reason: SDUPTHER

## 2018-12-11 RX ORDER — GABAPENTIN 100 MG/1
100 CAPSULE ORAL 3 TIMES DAILY
Qty: 90 CAPSULE | Refills: 11 | Status: SHIPPED | OUTPATIENT
Start: 2018-12-11 | End: 2019-09-09

## 2018-12-11 NOTE — PROGRESS NOTES
"INTERNAL MEDICINE INITIAL VISIT NOTE      CHIEF COMPLAINT     Chief Complaint   Patient presents with    Establish Care       HPI     Anmol Angeles Jr. is a 48 y.o.  male who presents with anxiety c depression, chronic neck and back pain, hx R sided cervical radiculopathy, hx anal fistula, sinus bradycardia, hx IVDU, tob use, hx head injury about 4 years ago due to "epileptic seizure" c seizure d/o in the past (pt reports he thinks "episodes" related to anxiety but had episodes of LOC that he says was told were seizures; last episode was 4 years ago, has refused all seizure meds in the past), had hx subdural hematoma, here today to establish care.    Since hx head trauma, reports occasional sx of vertigo and ears ringing.    Regarding hx anal fistula, has been followed by CRS c mult surgeries in the past including a fistula plug, c persistent drainage despite abx in the past.  Most recently, in Oct 2018, had examination under anesthesia c seton placement and curettage of fistula tract.    Says he still has "spotting" from his rectum, previously yellowish in color but sometimes has blood in his stools.  Reports chronic discomfort.      On chart review, pt c elevated LFTs on labs from Aug.  Had abd u/s at that time that was unremarkable.  HIV and Hep testing at that time neg.  EtOH "occasionally" but says he had seven beers last night and had several beers last week as well.  Describes himself as a moderate social drinker.  Says on average drinks about one beer a week.  Tylenol use: none.  Denies hx IVDU.  Occasionally smokes marijuana.  Smokes cigarettes but reports no desire to quit.    Was seen by Dr. Hughes back in 2015 at which time X-rays of C and T/L spine doen which showed mild disc space narrowing at C5-6 but otherwise unremarkable and was referred to spine clinic but never went.  Still reports pain in his neck c radiation down his R shoulder and R arm.    Currently denies feeling anxious or " depressed.  No si/hi.    Past Medical History:  Past Medical History:   Diagnosis Date    Depressive disorder, not elsewhere classified 11/25/2014    Perirectal fistula     Seizures        Past Surgical History:  Past Surgical History:   Procedure Laterality Date    BACK SURGERY      COLONOSCOPY N/A 8/22/2016    Procedure: COLONOSCOPY;  Surgeon: TROY Inman MD;  Location: [facility] (4TH FLR);  Service: Endoscopy;  Laterality: N/A;    COLONOSCOPY N/A 8/22/2016    Performed by TROY Inman MD at [facility] ENDO (4TH FLR)    Exam under anesthesia N/A 10/12/2018    Performed by TROY Inman MD at [facility] OR 2ND FLR    EXAM UNDER ANESTHESIA N/A 6/9/2017    Performed by TROY Inman MD at [facility] OR 2ND FLR    EXAM UNDER ANESTHESIA N/A 7/29/2016    Performed by TROY Inman MD at [facility] OR 2ND FLR    EXAM UNDER ANESTHESIA N/A 10/9/2014    Performed by TROY Inman MD at [facility] OR 2ND FLR    EXAM UNDER ANESTHESIA Fistula plug N/A 10/6/2016    Performed by TROY Inman MD at [facility] OR 2ND FLR    EXAM UNDER ANESTHESIA with drainage of complex fistula and seton insertion N/A 12/22/2016    Performed by TROY Inman MD at [facility] OR 2ND FLR    EXAMINATION UNDER ANESTHESIA N/A 10/12/2018    Procedure: Exam under anesthesia;  Surgeon: TROY Inman MD;  Location: [facility] OR [address] [place] [address];  Service: Colon and Rectal;  Laterality: N/A;    fistula surgery      FISTULECTOMY  6/9/2017    Performed by TROY Inman MD at [facility] OR [address] [place] [address]    FISTULOTOMY  10/6/2016    Performed by TROY Inman MD at [facility] OR [address] [place] [address]    HAND SURGERY Left     INCISION AND DRAINAGE (I & D) external fistula cacity N/A 10/6/2016    Performed by TROY Inman MD at [facility] OR [address] [place] [address]    INSERTION OF SETON STITCH N/A 10/12/2018    Procedure: PLACEMENT, SETON STITCH;  Surgeon: TROY Inman MD;  Location: [facility] OR [address] [place] [address];  Service: Colon and Rectal;  Laterality: N/A;    INSERTION-CATHETER, possible seton catheter N/A 10/9/2014     Performed by TROY Inman MD at [facility] OR 2ND FLR    LEG SURGERY      plate    LEG SURGERY Right     PLACEMENT, SETON STITCH N/A 10/12/2018    Performed by TROY Inman MD at [facility] OR 2ND FLR    PLACEMENT-ANAL PLUG  6/9/2017    Performed by TROY Inman MD at [facility] OR 2ND FLR    PLACEMENT-SETON DRAIN N/A 7/29/2016    Performed by TROY Inman MD at [facility] OR [facility] FLR       Home Medications:  Prior to Admission medications    Medication Sig Start Date End Date Taking? Authorizing Provider   oxyCODONE-acetaminophen (PERCOCET) 5-325 mg per tablet Take 1 tablet by mouth every 4 (four) hours as needed for Pain. 10/12/18   Callum Pena MD       Allergies:  Review of patient's allergies indicates:   Allergen Reactions    Latex, natural rubber        Family History:  History reviewed. No pertinent family history.    Social History:  Social History     Tobacco Use    Smoking status: Current Every Day Smoker     Packs/day: 1.50     Years: 30.00     Pack years: 45.00     Types: Cigarettes    Smokeless tobacco: Never Used   Substance Use Topics    Alcohol use: Yes     Comment: beer occasionally     Drug use: Yes     Types: Marijuana       Review of Systems:  Review of Systems   Constitutional: Negative for appetite change, chills, fatigue, fever and unexpected weight change.   HENT: Negative for congestion, hearing loss and rhinorrhea.    Eyes: Negative for pain and visual disturbance.   Respiratory: Negative for cough, chest tightness, shortness of breath and wheezing.    Cardiovascular: Negative for chest pain, palpitations and leg swelling.   Gastrointestinal: Negative for abdominal distention and abdominal pain.   Endocrine: Negative for polydipsia and polyuria.   Genitourinary: Negative for decreased urine volume, discharge, dysuria and frequency.   Musculoskeletal: Positive for back pain and neck pain.   Skin: Positive for color change.   Neurological: Negative for dizziness, weakness, numbness  "and headaches.   Psychiatric/Behavioral: Negative for behavioral problems and confusion.       Health Maintenance:   Immunizations:   Influenza, TDap, and Pneumovax all rec but declined by pt, Risks and benefits were discussed with patient.  Shingrix rec once back in stock    Cancer Screening:  Colonoscopy:  8/2016 c polyp in ascending colon removed, hemorrhoids, perianal fistula; path c adenomatous polyp c rec f/u in 5 yrs.      PHYSICAL EXAM     /86 (BP Location: Left arm, Patient Position: Sitting, BP Method: Medium (Manual))   Pulse 62   Ht 6' 2" (1.88 m)   Wt 87.5 kg (192 lb 14.4 oz)   SpO2 98%   BMI 24.77 kg/m²     GEN - A+OX4, NAD   HEENT - PERRL, EOMI, OP clear  Neck - No thyromegaly or cervical LAD. No thyroid masses felt.  CV - RRR, no m/r/g  Chest - CTAB, no wheezing, crackles, or rhonchi  Abd - S/NT/ND/+BS.   Ext - 2+BDP. No C/C/E.  Neuro - 5/5 BUE and BLE strength.  LN - No LAD appreciated.  No axillary LAD noted.  Skin - Normal color and texture x de-pigmentation around wrists B    LABS     Lab Results   Component Value Date    WBC 12.89 (H) 08/14/2018    HGB 14.7 08/14/2018    HCT 44.7 08/14/2018    MCV 93 08/14/2018     08/14/2018     CMP  Sodium   Date Value Ref Range Status   08/14/2018 139 136 - 145 mmol/L Final     Potassium   Date Value Ref Range Status   08/14/2018 4.9 3.5 - 5.1 mmol/L Final     Chloride   Date Value Ref Range Status   08/14/2018 103 95 - 110 mmol/L Final     CO2   Date Value Ref Range Status   08/14/2018 28 23 - 29 mmol/L Final     Glucose   Date Value Ref Range Status   08/14/2018 91 70 - 110 mg/dL Final     BUN, Bld   Date Value Ref Range Status   08/14/2018 15 6 - 20 mg/dL Final     Creatinine   Date Value Ref Range Status   08/14/2018 0.8 0.5 - 1.4 mg/dL Final     Calcium   Date Value Ref Range Status   08/14/2018 9.8 8.7 - 10.5 mg/dL Final     Total Protein   Date Value Ref Range Status   08/14/2018 8.0 6.0 - 8.4 g/dL Final     Albumin   Date Value Ref " Range Status   08/14/2018 4.0 3.5 - 5.2 g/dL Final     Total Bilirubin   Date Value Ref Range Status   08/14/2018 0.4 0.1 - 1.0 mg/dL Final     Comment:     For infants and newborns, interpretation of results should be based  on gestational age, weight and in agreement with clinical  observations.  Premature Infant recommended reference ranges:  Up to 24 hours.............<8.0 mg/dL  Up to 48 hours............<12.0 mg/dL  3-5 days..................<15.0 mg/dL  6-29 days.................<15.0 mg/dL       Alkaline Phosphatase   Date Value Ref Range Status   08/14/2018 202 (H) 55 - 135 U/L Final     AST   Date Value Ref Range Status   08/14/2018 47 (H) 10 - 40 U/L Final     ALT   Date Value Ref Range Status   08/14/2018 62 (H) 10 - 44 U/L Final     Anion Gap   Date Value Ref Range Status   08/14/2018 8 8 - 16 mmol/L Final     eGFR if    Date Value Ref Range Status   08/14/2018 >60.0 >60 mL/min/1.73 m^2 Final     eGFR if non    Date Value Ref Range Status   08/14/2018 >60.0 >60 mL/min/1.73 m^2 Final     Comment:     Calculation used to obtain the estimated glomerular filtration  rate (eGFR) is the CKD-EPI equation.        Lab Results   Component Value Date    [id number] Negative 08/14/2018     Lab Results   Component Value Date    HEPAIGM Negative 08/14/2018    HEPBIGM Negative 08/14/2018    HEPCAB Negative 08/14/2018     No results found for: LDLCALC  No results found for: LABA1C, HGBA1C    Lab Results   Component Value Date    TSH 0.629 10/02/2015     No results found for: LDLCALC      ASSESSMENT/PLAN     Anmol Angeles . is a 48 y.o. male with  Anmol was seen today for establish care.    Diagnoses and all orders for this visit:    Establishing care with new doctor, encounter for  History and physical exam completed.  Health maintenance reviewed as above.    Chronic neck pain  Appears c/w cervical radiculopathy c narrrowing at C5-6 on previous xrays  Consider MRI, will defer to  spine clinic  Offered PT which pt declined today  Will try course of gabapentin  Can take ibuprofen sparingly, risks discussed.  -     Ambulatory Referral to Back & Spine Clinic  -     gabapentin (NEURONTIN) 100 MG capsule; Take 1 capsule (100 mg total) by mouth 3 (three) times daily.  -     ibuprofen (ADVIL,MOTRIN) 800 MG tablet; Take 1 tablet (800 mg total) by mouth daily as needed for Pain.    Chronic bilateral low back pain without sciatica  -     Ambulatory Referral to Back & Spine Clinic    Transsphincteric anal fistula  Advised to f/u c CRS for sx  Gabapentin may help    Tobacco use  Patient counseled on the need to stop smoking.  Offered smoking cessation which pt declined.    Elevated LFTs  Likely due to EtOH use  Hep neg.  U/s unremarkable.  Will repeat labs in one week, advised to refrain from EtOH this week, drank 7 beers yesterday.    Leukocytosis, unspecified type  Nonspecific, prev HIV yesterday and previously noted axillary LAD now resolved  No B sx  Will repeat labs now  Could be due to anal fistula  -     CBC auto differential; Future; Expected date: 12/11/2018    Seizure disorder  As per HPI  Refused Neuro eval or meds  No issues x4 years    Prehypertension  Noted on BP today  Recommend low sodium diet, < 2g Na/day as well as weight loss and exercise.  -     Comprehensive metabolic panel; Future; Expected date: 12/11/2018    Lipid screening  -     Lipid panel; Future; Expected date: 12/11/2018    Screening for thyroid disorder  -     TSH; Future; Expected date: 12/11/2018    Abnormal finding of blood chemistry   -     Lipid panel; Future; Expected date: 12/11/2018    Depression with anxiety  Denies sx currently  Will monitor.  -     TSH; Future; Expected date: 12/11/2018      HM as above    RTC in 4 months, sooner if needed and depending on labs.    Katie Rhodes MD  Department of Internal Medicine - [facility]  12/11/2018

## 2018-12-18 ENCOUNTER — LAB VISIT (OUTPATIENT)
Dept: LAB | Facility: HOSPITAL | Age: 48
End: 2018-12-18
Attending: INTERNAL MEDICINE
Payer: MEDICARE

## 2018-12-18 DIAGNOSIS — D72.829 LEUKOCYTOSIS, UNSPECIFIED TYPE: ICD-10-CM

## 2018-12-18 DIAGNOSIS — F41.8 DEPRESSION WITH ANXIETY: ICD-10-CM

## 2018-12-18 DIAGNOSIS — Z13.220 LIPID SCREENING: ICD-10-CM

## 2018-12-18 DIAGNOSIS — R03.0 PREHYPERTENSION: ICD-10-CM

## 2018-12-18 DIAGNOSIS — Z13.29 SCREENING FOR THYROID DISORDER: ICD-10-CM

## 2018-12-18 DIAGNOSIS — R79.9 ABNORMAL FINDING OF BLOOD CHEMISTRY: ICD-10-CM

## 2018-12-18 LAB
ALBUMIN SERPL BCP-MCNC: 4.1 G/DL
ALP SERPL-CCNC: 93 U/L
ALT SERPL W/O P-5'-P-CCNC: 67 U/L
ANION GAP SERPL CALC-SCNC: 10 MMOL/L
AST SERPL-CCNC: 50 U/L
BASOPHILS # BLD AUTO: 0.02 K/UL
BASOPHILS NFR BLD: 0.3 %
BILIRUB SERPL-MCNC: 0.5 MG/DL
BUN SERPL-MCNC: 12 MG/DL
CALCIUM SERPL-MCNC: 9.7 MG/DL
CHLORIDE SERPL-SCNC: 101 MMOL/L
CHOLEST SERPL-MCNC: 246 MG/DL
CHOLEST/HDLC SERPL: 5.6 {RATIO}
CO2 SERPL-SCNC: 28 MMOL/L
CREAT SERPL-MCNC: 0.8 MG/DL
DIFFERENTIAL METHOD: ABNORMAL
EOSINOPHIL # BLD AUTO: 0.2 K/UL
EOSINOPHIL NFR BLD: 2.8 %
ERYTHROCYTE [DISTWIDTH] IN BLOOD BY AUTOMATED COUNT: 14 %
EST. GFR  (AFRICAN AMERICAN): >60 ML/MIN/1.73 M^2
EST. GFR  (NON AFRICAN AMERICAN): >60 ML/MIN/1.73 M^2
GLUCOSE SERPL-MCNC: 104 MG/DL
HCT VFR BLD AUTO: 50.1 %
HDLC SERPL-MCNC: 44 MG/DL
HDLC SERPL: 17.9 %
HGB BLD-MCNC: 16 G/DL
LDLC SERPL CALC-MCNC: 171.2 MG/DL
LYMPHOCYTES # BLD AUTO: 2.5 K/UL
LYMPHOCYTES NFR BLD: 32.5 %
MCH RBC QN AUTO: 29.7 PG
MCHC RBC AUTO-ENTMCNC: 31.9 G/DL
MCV RBC AUTO: 93 FL
MONOCYTES # BLD AUTO: 0.8 K/UL
MONOCYTES NFR BLD: 9.7 %
NEUTROPHILS # BLD AUTO: 4.2 K/UL
NEUTROPHILS NFR BLD: 54.4 %
NONHDLC SERPL-MCNC: 202 MG/DL
PLATELET # BLD AUTO: 215 K/UL
PMV BLD AUTO: 10.3 FL
POTASSIUM SERPL-SCNC: 5.3 MMOL/L
PROT SERPL-MCNC: 7.4 G/DL
RBC # BLD AUTO: 5.39 M/UL
SODIUM SERPL-SCNC: 139 MMOL/L
TRIGL SERPL-MCNC: 154 MG/DL
TSH SERPL DL<=0.005 MIU/L-ACNC: 0.46 UIU/ML
WBC # BLD AUTO: 7.76 K/UL

## 2018-12-18 PROCEDURE — 36415 COLL VENOUS BLD VENIPUNCTURE: CPT

## 2018-12-18 PROCEDURE — 85025 COMPLETE CBC W/AUTO DIFF WBC: CPT

## 2018-12-18 PROCEDURE — 80053 COMPREHEN METABOLIC PANEL: CPT

## 2018-12-18 PROCEDURE — 80061 LIPID PANEL: CPT

## 2018-12-18 PROCEDURE — 84443 ASSAY THYROID STIM HORMONE: CPT

## 2018-12-19 ENCOUNTER — TELEPHONE (OUTPATIENT)
Dept: INTERNAL MEDICINE | Facility: CLINIC | Age: 48
End: 2018-12-19

## 2018-12-19 DIAGNOSIS — E87.5 HYPERKALEMIA: ICD-10-CM

## 2018-12-19 DIAGNOSIS — R79.89 ELEVATED LFTS: Primary | ICD-10-CM

## 2019-01-04 ENCOUNTER — DOCUMENTATION ONLY (OUTPATIENT)
Dept: TRANSPLANT | Facility: CLINIC | Age: 49
End: 2019-01-04

## 2019-01-04 ENCOUNTER — TELEPHONE (OUTPATIENT)
Dept: SPINE | Facility: CLINIC | Age: 49
End: 2019-01-04

## 2019-01-04 DIAGNOSIS — M54.2 NECK PAIN: Primary | ICD-10-CM

## 2019-01-04 NOTE — NURSING
Pt records reviewed.   Pt will be referred to Hepatology.  Elevated LFTs  Initial referral received  from the workque.   Referring Provider/diagnosis  Katie Rhodes MD  Referral letter sent to patient.

## 2019-01-04 NOTE — LETTER
January 4, 2019    Anmol Angeles  [address] [facility] [place] [address]      Dear Anmol Angeles:    Your doctor has referred you to the [facility]. You will be contacted by our office and an initial appointment will then be scheduled for you.    We look forward to seeing you soon. If you have any further questions, please contact us at [phone].       Sincerely,        [facility]   [address] [place] [id number]  [phone]

## 2019-01-04 NOTE — LETTER
January 4, 2019    Katie Rhodes MD  [address]  [place] [address]      Dear Dr. Rhodes    Patient: Anmol Angeles Jr.   MR Number: [id number]   YOB: 1970     Thank you for the referral of Anmol Angeles Jr. to the [facility]. An initial appointment will be scheduled for your patient with one of our Hepatologists.      Thank you again for your trust in our program.  If there is anything we can do for you or your staff, please feel free to contact us.        Sincerely,        [facility]  [facility] [place] [address]  [phone]

## 2019-02-07 ENCOUNTER — TELEPHONE (OUTPATIENT)
Dept: HEPATOLOGY | Facility: CLINIC | Age: 49
End: 2019-02-07

## 2019-02-07 NOTE — TELEPHONE ENCOUNTER
----- Message from Soumya Jean-Baptiste LPN sent at 1/4/2019 12:29 PM CST -----  Pt records reviewed.   Pt will be referred to Hepatology.  Elevated LFTs  Initial referral received  from the workque.   Referring Provider/diagnosis  Katie Rhodes MD  Referral letter sent to patient.

## 2019-08-27 ENCOUNTER — OFFICE VISIT (OUTPATIENT)
Dept: INTERNAL MEDICINE | Facility: CLINIC | Age: 49
End: 2019-08-27
Attending: FAMILY MEDICINE
Payer: MEDICARE

## 2019-08-27 VITALS
BODY MASS INDEX: 24.13 KG/M2 | DIASTOLIC BLOOD PRESSURE: 80 MMHG | SYSTOLIC BLOOD PRESSURE: 130 MMHG | HEIGHT: 74 IN | HEART RATE: 88 BPM | OXYGEN SATURATION: 99 % | TEMPERATURE: 98 F | WEIGHT: 188.06 LBS

## 2019-08-27 DIAGNOSIS — R22.2 ABDOMINAL WALL MASS: ICD-10-CM

## 2019-08-27 DIAGNOSIS — L80 VITILIGO: ICD-10-CM

## 2019-08-27 DIAGNOSIS — L30.9 ECZEMA, UNSPECIFIED TYPE: ICD-10-CM

## 2019-08-27 DIAGNOSIS — M54.5 LOW BACK PAIN, UNSPECIFIED BACK PAIN LATERALITY, UNSPECIFIED CHRONICITY, WITH SCIATICA PRESENCE UNSPECIFIED: Primary | ICD-10-CM

## 2019-08-27 DIAGNOSIS — R23.8 SKIN SENSITIVITY: ICD-10-CM

## 2019-08-27 DIAGNOSIS — R94.5 ABNORMAL RESULTS OF LIVER FUNCTION STUDIES: ICD-10-CM

## 2019-08-27 LAB
AMORPH CRY UR QL COMP ASSIST: ABNORMAL
BACTERIA #/AREA URNS AUTO: ABNORMAL /HPF
BILIRUB UR QL STRIP: NEGATIVE
CLARITY UR REFRACT.AUTO: ABNORMAL
COLOR UR AUTO: ABNORMAL
GLUCOSE UR QL STRIP: NEGATIVE
HGB UR QL STRIP: NEGATIVE
KETONES UR QL STRIP: NEGATIVE
LEUKOCYTE ESTERASE UR QL STRIP: NEGATIVE
MICROSCOPIC COMMENT: ABNORMAL
NITRITE UR QL STRIP: NEGATIVE
PH UR STRIP: 5 [PH] (ref 5–8)
PROT UR QL STRIP: NEGATIVE
RBC #/AREA URNS AUTO: 6 /HPF (ref 0–4)
SP GR UR STRIP: 1.03 (ref 1–1.03)
URN SPEC COLLECT METH UR: ABNORMAL
WBC #/AREA URNS AUTO: 15 /HPF (ref 0–5)
YEAST UR QL AUTO: ABNORMAL

## 2019-08-27 PROCEDURE — 3008F BODY MASS INDEX DOCD: CPT | Mod: CPTII,S$GLB,, | Performed by: FAMILY MEDICINE

## 2019-08-27 PROCEDURE — 99204 PR OFFICE/OUTPT VISIT, NEW, LEVL IV, 45-59 MIN: ICD-10-PCS | Mod: S$GLB,,, | Performed by: FAMILY MEDICINE

## 2019-08-27 PROCEDURE — 99999 PR PBB SHADOW E&M-EST. PATIENT-LVL IV: CPT | Mod: PBBFAC,,, | Performed by: FAMILY MEDICINE

## 2019-08-27 PROCEDURE — 99204 OFFICE O/P NEW MOD 45 MIN: CPT | Mod: S$GLB,,, | Performed by: FAMILY MEDICINE

## 2019-08-27 PROCEDURE — 3008F PR BODY MASS INDEX (BMI) DOCUMENTED: ICD-10-PCS | Mod: CPTII,S$GLB,, | Performed by: FAMILY MEDICINE

## 2019-08-27 PROCEDURE — 99999 PR PBB SHADOW E&M-EST. PATIENT-LVL IV: ICD-10-PCS | Mod: PBBFAC,,, | Performed by: FAMILY MEDICINE

## 2019-08-27 PROCEDURE — 81001 URINALYSIS AUTO W/SCOPE: CPT

## 2019-08-27 RX ORDER — CYCLOBENZAPRINE HCL 5 MG
5 TABLET ORAL 3 TIMES DAILY PRN
Qty: 45 TABLET | Refills: 0 | Status: SHIPPED | OUTPATIENT
Start: 2019-08-27 | End: 2019-10-23

## 2019-08-27 RX ORDER — METHYLPREDNISOLONE 4 MG/1
TABLET ORAL
Qty: 21 TABLET | Refills: 0 | Status: SHIPPED | OUTPATIENT
Start: 2019-08-27 | End: 2019-11-25

## 2019-08-27 RX ORDER — IBUPROFEN 800 MG/1
800 TABLET ORAL 3 TIMES DAILY PRN
Qty: 30 TABLET | Refills: 0 | Status: SHIPPED | OUTPATIENT
Start: 2019-08-27 | End: 2019-10-10 | Stop reason: SDUPTHER

## 2019-08-27 RX ORDER — TRIAMCINOLONE ACETONIDE 1 MG/G
CREAM TOPICAL 2 TIMES DAILY
Qty: 80 G | Refills: 1 | Status: SHIPPED | OUTPATIENT
Start: 2019-08-27 | End: 2021-03-30
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
## 2019-08-29 ENCOUNTER — TELEPHONE (OUTPATIENT)
Dept: INTERNAL MEDICINE | Facility: CLINIC | Age: 49
End: 2019-08-29

## 2019-08-29 DIAGNOSIS — R31.9 URINARY TRACT INFECTION WITH HEMATURIA, SITE UNSPECIFIED: Primary | ICD-10-CM

## 2019-08-29 DIAGNOSIS — N39.0 URINARY TRACT INFECTION WITH HEMATURIA, SITE UNSPECIFIED: Primary | ICD-10-CM

## 2019-08-29 RX ORDER — DOXYCYCLINE 100 MG/1
100 CAPSULE ORAL 2 TIMES DAILY
Qty: 14 CAPSULE | Refills: 0 | Status: SHIPPED | OUTPATIENT
Start: 2019-08-29 | End: 2019-09-05

## 2019-08-29 NOTE — TELEPHONE ENCOUNTER
Pt called back and was informed of the results, referral and medication sent to his pharmacy. He is requesting a call from Dr. Baeza to further discuss the referral..Stephanie Turner LPN

## 2019-08-29 NOTE — TELEPHONE ENCOUNTER
Please call patient and explain that his urinalysis showed what might be a urinary infection.  I would like to treat him with a few days of an antibiotic called doxycycline, twice Daily and additionally a Urology consult for a prostate check.  Please see medication order and referral order.. Thank you    Cc, PCP

## 2019-08-30 NOTE — TELEPHONE ENCOUNTER
Explained the rationale of a Urology consult given a few red cells in his urinalysis.  Explained that they may or may not do further workup.  He was okay with a referral.  Also has a back clinic appointment pending

## 2019-09-04 NOTE — TELEPHONE ENCOUNTER
Spoke with the pt and informed him of the response and he does have the number for Urology. He stated he will call soon he just has some things to take care of.Stephanie Turner LPN

## 2019-09-05 ENCOUNTER — TELEPHONE (OUTPATIENT)
Dept: SPINE | Facility: CLINIC | Age: 49
End: 2019-09-05

## 2019-09-05 ENCOUNTER — PATIENT OUTREACH (OUTPATIENT)
Dept: ADMINISTRATIVE | Facility: OTHER | Age: 49
End: 2019-09-05

## 2019-09-05 DIAGNOSIS — M54.5 LOW BACK PAIN, UNSPECIFIED BACK PAIN LATERALITY, UNSPECIFIED CHRONICITY, WITH SCIATICA PRESENCE UNSPECIFIED: Primary | ICD-10-CM

## 2019-09-05 NOTE — TELEPHONE ENCOUNTER
----- Message from Magda Otoole PA-C sent at 9/5/2019  7:47 AM CDT -----  He needs xrays prior to his visit on Monday. Assuming it's lumbar spine?

## 2019-09-05 NOTE — PROGRESS NOTES
"Subjective:      Patient ID: Anmol Angeles Jr. is a 49 y.o. male.    Chief Complaint: Low-back Pain      HPI  (Celestre)    History of chronic neck/back pain, depression, anxiety, ?seizures.     History of lumbar surgery prior to Jodi with improvement (?discectomy). He's had intermittent pain since that time, usually on right side and self resolving after 3-4 days.     Now with 2 month history of constant left sided back/flank pain with no leg pain, but he has some tightness in his leg. He notes "goose bumps" all over his body that are intermittent and uncomfortable. Pain is better with laying down and standing. Pain is worse with prolonged sitting/driving. He rates his pain as a 7 on a scale of 1-10. He has tingling everywhere. He notes numbness in right buttock, has numbness in both legs with sitting on toilet. No weakness. Pain is aching/grabbing in nature.     Given dose pack, flexeril, and motrin by PCP on 8/27/19. Minimal improvement with flexeril. He did not take steroids. No recent PT or injections. Lumbar surgery as above. Minimal relief with OTC motrin.       Review of Systems   Constitution: Negative for fever, malaise/fatigue, night sweats, weight gain and weight loss.   HENT: Positive for tinnitus. Negative for hearing loss, nosebleeds and odynophagia.    Eyes: Negative for blurred vision and double vision.   Cardiovascular: Negative for chest pain, irregular heartbeat and palpitations.   Respiratory: Negative for cough, hemoptysis, shortness of breath and wheezing.    Endocrine: Negative for cold intolerance and polydipsia.   Hematologic/Lymphatic: Does not bruise/bleed easily.   Skin: Negative for dry skin, poor wound healing, rash and suspicious lesions.   Musculoskeletal: Positive for back pain, muscle cramps and neck pain.        See HPI for pertinent positives. Positive for swelling and masses/lumps.    Gastrointestinal: Negative for bloating, abdominal pain, constipation, diarrhea, " hematochezia, melena, nausea and vomiting.   Genitourinary: Negative for bladder incontinence, dysuria, hematuria, hesitancy and incomplete emptying.   Neurological: Positive for numbness and paresthesias. Negative for disturbances in coordination, dizziness, focal weakness, headaches, loss of balance, seizures and weakness.        Positive for abnormal arm/leg sensations.    Psychiatric/Behavioral: Negative for depression and hallucinations. The patient is not nervous/anxious.            Objective:        General: Anmol is well-developed, well-nourished, appears stated age, in no acute distress, alert and oriented to time, place and person.     General    Vitals reviewed.  Constitutional: He is oriented to person, place, and time. He appears well-developed and well-nourished.   Pulmonary/Chest: Effort normal.   Abdominal: He exhibits no distension.   Neurological: He is alert and oriented to person, place, and time.   Psychiatric: He has a normal mood and affect. His behavior is normal. Judgment and thought content normal.           Gait: normal, tandem walking is normal and he is able to heel/toe stand.     On exam of the lumbar spine, well healed central lower lumbar incision. He has mild central and left sided lower lumbar tenderness.     Skin in lumbar region is warm to the touch without visible rashes.     muscle tone normal without spasm, limited range of motion with pain  Pain in flexion. and Pain in extension.    Strength testing of the bilateral LEs shows  Right hip abduction:  +5/5  Left hip abduction:  +5/5  Right hip flexion:  +5/5  Left hip flexion:  +5/5  Right hip extensors:  +5/5  Left hip extensors:  +5/5  Right quadriceps:  +5/5  Left quadriceps:  +5/5  Right hamstring:  +5/5  Left hamstring:  +5/5  Right dorsiflexion:  +5/5  Left dorsiflexion:  +5/5  Right plantar flexion:  +5/5  Left plantar flexion:  +5/5   Right EHL:  +5/5   Left EHL:  +5/5    negative clonus of bilateral LEs.     negative  straight leg raise on bilateral LEs.     DTRs:  Right patellar:  +2     Left patellar:  +2  Right achilles:  +2   Left achilles:  +2    Sensation is grossly intact in L2, L3, L4, L5, and S1 distribution.    Right hip has no pain with IR/ER. Left hip has no pain with IR/ER.      On exam of bilateral UEs, patient has full painfree ROM with no signs of clubbing, laxity, cyanosis, edema, instability, weakness, or tenderness.         Assessment:       1. Left-sided low back pain without sciatica, unspecified chronicity    2. History of lumbar surgery           Plan:       Orders Placed This Encounter    MRI LUMBAR SPINE WITHOUT CONTRAST       History of lumbar surgery prior to Jodi with improvement (?discectomy). He's had intermittent pain since that time, usually on right side and self resolving after 3-4 days. Now with 2 month history of constant left sided back/flank pain with no leg pain. This is most painful/longest flare up since his surgery. No imaging available. Pain likely due to underlying degeneration. Treatment options reviewed with patient and following plan made:     - Take medrol dose pack as prescribed by PCP. Reviewed dosing and side effects.   - Lumbar XRs and lumbar MRI to further evaluate for annular tear. History of previous lumbar surgery- does not need contrast.   - Depending on results of imaging, consider lumbar PT and/or injections with pain management.   - Not sure of etiology of feelings of goose bumps all over body. Not spine mediated. Message sent to PCP, consider referral to neurology?     Follow-up: Follow up in 2 weeks (on 9/23/2019). If there are any questions prior to this, the patient was instructed to contact the office.

## 2019-09-09 ENCOUNTER — OFFICE VISIT (OUTPATIENT)
Dept: SPINE | Facility: CLINIC | Age: 49
End: 2019-09-09
Payer: MEDICARE

## 2019-09-09 VITALS
DIASTOLIC BLOOD PRESSURE: 89 MMHG | WEIGHT: 188 LBS | BODY MASS INDEX: 24.13 KG/M2 | SYSTOLIC BLOOD PRESSURE: 135 MMHG | HEIGHT: 74 IN | HEART RATE: 92 BPM

## 2019-09-09 DIAGNOSIS — Z98.890 HISTORY OF LUMBAR SURGERY: ICD-10-CM

## 2019-09-09 DIAGNOSIS — M54.50 LEFT-SIDED LOW BACK PAIN WITHOUT SCIATICA, UNSPECIFIED CHRONICITY: Primary | ICD-10-CM

## 2019-09-09 PROCEDURE — 99213 PR OFFICE/OUTPT VISIT, EST, LEVL III, 20-29 MIN: ICD-10-PCS | Mod: S$GLB,,, | Performed by: PHYSICIAN ASSISTANT

## 2019-09-09 PROCEDURE — 3008F PR BODY MASS INDEX (BMI) DOCUMENTED: ICD-10-PCS | Mod: CPTII,S$GLB,, | Performed by: PHYSICIAN ASSISTANT

## 2019-09-09 PROCEDURE — 3008F BODY MASS INDEX DOCD: CPT | Mod: CPTII,S$GLB,, | Performed by: PHYSICIAN ASSISTANT

## 2019-09-09 PROCEDURE — 99999 PR PBB SHADOW E&M-EST. PATIENT-LVL IV: CPT | Mod: PBBFAC,,, | Performed by: PHYSICIAN ASSISTANT

## 2019-09-09 PROCEDURE — 99213 OFFICE O/P EST LOW 20 MIN: CPT | Mod: S$GLB,,, | Performed by: PHYSICIAN ASSISTANT

## 2019-09-09 PROCEDURE — 99999 PR PBB SHADOW E&M-EST. PATIENT-LVL IV: ICD-10-PCS | Mod: PBBFAC,,, | Performed by: PHYSICIAN ASSISTANT

## 2019-09-09 NOTE — PATIENT INSTRUCTIONS
It was nice to meet you today!    Your pain is likely due to flare up of underlying wear and tear (arthritis), but I want to get some imaging to be sure. Will get lumbar xrays and MRI. I will see you back to review them and discuss further options.     I agree with doing the steroid pack from Dr. Baeza. If you have any side effects, you can stop it.     Depending on your imaging and how you are doing, may consider physical therapy or injections with pain management at your follow up.     Call me if you need anything prior to your follow up.     Magda   [phone]

## 2019-09-18 ENCOUNTER — TELEPHONE (OUTPATIENT)
Dept: INTERNAL MEDICINE | Facility: CLINIC | Age: 49
End: 2019-09-18

## 2019-09-18 DIAGNOSIS — R74.8 ABNORMAL TRANSAMINASES: ICD-10-CM

## 2019-09-18 DIAGNOSIS — R93.2 ABNORMAL FINDINGS ON DIAGNOSTIC IMAGING OF LIVER AND BILIARY TRACT: ICD-10-CM

## 2019-09-18 DIAGNOSIS — R16.0 HEPATOMEGALY: ICD-10-CM

## 2019-09-18 DIAGNOSIS — R94.5 ABNORMAL RESULTS OF LIVER FUNCTION STUDIES: Primary | ICD-10-CM

## 2019-09-18 NOTE — TELEPHONE ENCOUNTER
Please call patient and explain that I reviewed his recent abdominal ultrasound, which showed some enlargement of the liver.  This is very nonspecific and does not correlate with the location of his symptoms.  We see liver enlargement on ultrasounds quite frequently, and often without explanation.    He had some elevated liver tests from his laboratory last year.  I would like to repeat this with some additional tests and refer him to our liver Clinic to review everything.  Please see orders for additional lab and a referral to the hepatology clinic.    Thank you for contacting patient.    Cc: PCP

## 2019-09-18 NOTE — TELEPHONE ENCOUNTER
----- Message from Soumya Wu sent at 9/18/2019  8:13 AM CDT -----  Regarding: Renee Sexton,   Can you get patient in I have nothing for Dr. Rhodes until Jan.  Thanks, Tasia

## 2019-09-19 NOTE — TELEPHONE ENCOUNTER
Spoke with pt and informed him. He stated he has always had issues with his liver and elevated liver tests. Pt stated he is more concerned about his back now and has an appt on 9/25. Pt will call back to set up the other tests and appts.Stephanie Turner LPN

## 2019-09-20 ENCOUNTER — PATIENT OUTREACH (OUTPATIENT)
Dept: ADMINISTRATIVE | Facility: OTHER | Age: 49
End: 2019-09-20

## 2019-09-25 ENCOUNTER — TELEPHONE (OUTPATIENT)
Dept: SPINE | Facility: CLINIC | Age: 49
End: 2019-09-25

## 2019-09-25 ENCOUNTER — OFFICE VISIT (OUTPATIENT)
Dept: SPINE | Facility: CLINIC | Age: 49
End: 2019-09-25
Payer: MEDICARE

## 2019-09-25 ENCOUNTER — TELEPHONE (OUTPATIENT)
Dept: INTERNAL MEDICINE | Facility: CLINIC | Age: 49
End: 2019-09-25

## 2019-09-25 VITALS
DIASTOLIC BLOOD PRESSURE: 86 MMHG | HEART RATE: 94 BPM | SYSTOLIC BLOOD PRESSURE: 127 MMHG | BODY MASS INDEX: 24.13 KG/M2 | WEIGHT: 188 LBS | HEIGHT: 74 IN

## 2019-09-25 DIAGNOSIS — Z98.890 HISTORY OF LUMBAR SURGERY: ICD-10-CM

## 2019-09-25 DIAGNOSIS — M47.816 FACET HYPERTROPHY OF LUMBAR REGION: ICD-10-CM

## 2019-09-25 DIAGNOSIS — M48.061 LUMBAR FORAMINAL STENOSIS: ICD-10-CM

## 2019-09-25 DIAGNOSIS — M43.10 ACQUIRED SPONDYLOLISTHESIS: ICD-10-CM

## 2019-09-25 DIAGNOSIS — M48.061 SPINAL STENOSIS OF LUMBAR REGION WITHOUT NEUROGENIC CLAUDICATION: ICD-10-CM

## 2019-09-25 DIAGNOSIS — M54.50 BILATERAL LOW BACK PAIN WITHOUT SCIATICA, UNSPECIFIED CHRONICITY: Primary | ICD-10-CM

## 2019-09-25 DIAGNOSIS — M51.37 DDD (DEGENERATIVE DISC DISEASE), LUMBOSACRAL: ICD-10-CM

## 2019-09-25 PROCEDURE — 3008F PR BODY MASS INDEX (BMI) DOCUMENTED: ICD-10-PCS | Mod: CPTII,S$GLB,, | Performed by: PHYSICIAN ASSISTANT

## 2019-09-25 PROCEDURE — 99213 PR OFFICE/OUTPT VISIT, EST, LEVL III, 20-29 MIN: ICD-10-PCS | Mod: S$GLB,,, | Performed by: PHYSICIAN ASSISTANT

## 2019-09-25 PROCEDURE — 99213 OFFICE O/P EST LOW 20 MIN: CPT | Mod: S$GLB,,, | Performed by: PHYSICIAN ASSISTANT

## 2019-09-25 PROCEDURE — 99999 PR PBB SHADOW E&M-EST. PATIENT-LVL V: ICD-10-PCS | Mod: PBBFAC,,, | Performed by: PHYSICIAN ASSISTANT

## 2019-09-25 PROCEDURE — 3008F BODY MASS INDEX DOCD: CPT | Mod: CPTII,S$GLB,, | Performed by: PHYSICIAN ASSISTANT

## 2019-09-25 PROCEDURE — 99999 PR PBB SHADOW E&M-EST. PATIENT-LVL V: CPT | Mod: PBBFAC,,, | Performed by: PHYSICIAN ASSISTANT

## 2019-09-25 NOTE — TELEPHONE ENCOUNTER
Left voice message for patient to return call to schedule appointment from referral to Pain Medicine department.  Bhavani MUÑOZ [phone]

## 2019-09-25 NOTE — PROGRESS NOTES
Subjective:      Patient ID: Anmol Angeles Jr. is a 49 y.o. male.    Chief Complaint: Low-back Pain and Neck Pain      HPI  (Celestre)    History of chronic neck/back pain, depression, anxiety, ?seizures.     History of lumbar surgery prior to Jodi with improvement (?discectomy). He's had intermittent pain since that time, usually on right side and self resolving after 3-4 days.     At last visit he had 2 month history of constant left sided back/flank pain with no leg pain, but he has some tightness in his leg. Here to review his lumbar MRI/XRs. He did not get his XRs done.     He continues with diffuse LBP with no leg pain. Pain is generally worse with activity and sitting/driving. Pain is better with laying down. He rates his pain as a 4 on a scale of 1-10. He has tingling everywhere. He notes numbness in right buttock, has numbness in both legs with sitting on toilet. No weakness. Pain is aching/grabbing in nature. Did not take dose pack- worried about elevate liver labs. No recent PT or injections. Lumbar surgery as above. Minimal relief with OTC motrin.       Review of Systems   Constitution: Negative for chills, fever, night sweats and weight gain.   Gastrointestinal: Negative for bowel incontinence, nausea and vomiting.   Genitourinary: Negative for bladder incontinence.   Neurological: Negative for disturbances in coordination and loss of balance.           Objective:        General: Anmol is well-developed, well-nourished, appears stated age, in no acute distress, alert and oriented to time, place and person.     Ortho/SPM Exam     Patient sits comfortably in the exam room and answers questions appropriately. Grossly patient is able to move bilateral LEs without difficulty. Ambulates normally.       XRAY INTERPRETATION:  MRI of lumbar spine dated 9/12/19 is personally reviewed and shows facet hypertrophy L2-L3 with left paracentral disc, mild central/bilateral foraminal stenosis. Disc bulge/facet  hypertrophy L3-L4 with moderate central stenosis and moderate left/mild right foraminal stenosis. Disc bulge/facet hypertrophy L4-l5 with moderate bilateral foraminal stenosis. Retrolisthesis L5-S1 with disc bulge/facet hypertrophy and severe bilateral foraminal stenosis.         Assessment:       1. Bilateral low back pain without sciatica, unspecified chronicity    2. Lumbar foraminal stenosis    3. Facet hypertrophy of lumbar region    4. History of lumbar surgery    5. DDD (degenerative disc disease), lumbosacral    6. Spinal stenosis of lumbar region without neurogenic claudication    7. Acquired spondylolisthesis           Plan:       Orders Placed This Encounter    Ambulatory referral to Physical Therapy - Lumbar    Ambulatory referral to Pain Clinic       History of lumbar surgery prior to Jodi with improvement (?discectomy). He's had intermittent pain since that time, usually on right side and self resolving after 3-4 days. Now with 2-3 month history of constant LBP with no leg pain. This is most painful/longest flare up since his surgery. Known diffuse lumbar spondylosis with facet hypertrophy and multilevel foraminal stenosis. Also with mild central stenosis L2-L3 and moderate central stenosis L3-L4 with retrolisthesis L5-S1 with DDD. LBP likely multifactorial and may be from facets, retrolisthesis, and DDD. Treatment options reviewed with patient along with above lumbar MRI following plan made:     - Set up for caudal STEPHENIE with pain management ([facility] in [place]). May also consider bilateral L4-S1 facet injections.   - PT for lumbar spine with good HEP. External orders done.   - Message sent to PCP (Meagan) to see if he can do steroid dose pack.   - Consider lumbar XRs if no improvement with above STEPHENIE.   - He will f/u with Suzi in 2-3 months for recheck.     Follow-up: Follow up in about 2 months (around 11/25/2019). If there are any questions prior to this, the patient was instructed to contact  the office.

## 2019-09-25 NOTE — PATIENT INSTRUCTIONS
It was good to see you again!    You have diffuse wear and tear in your back. I want to do a lumbar injection with pain management. Dr. Grady will do this at [place]. His office will call you to set it up.     I also gave you physical therapy orders and a list of places. Pick one and call to get it scheduled.     I sent Dr. Rhodes a message to see if she thinks you can take the steroid pack. They will also talk to you about setting up a follow up.     You will see Ofelia Cook PA-C when you come back in 2-3 months. She is awesome and will take good care of you.     Call if you need anything. [phone]    Magda

## 2019-09-25 NOTE — TELEPHONE ENCOUNTER
----- Message from Magda Otoole PA-C sent at 9/25/2019 10:46 AM CDT -----  Please call him to schedule a follow up with Dr. Rhodes, he missed your last call.     Also he was given a steroid dose pack and is worried about taking it due to elevate liver labs. Please let him know if this is okay to take.     Thanks!    Magda

## 2019-09-27 ENCOUNTER — TELEPHONE (OUTPATIENT)
Dept: PAIN MEDICINE | Facility: CLINIC | Age: 49
End: 2019-09-27

## 2019-09-30 ENCOUNTER — PATIENT OUTREACH (OUTPATIENT)
Dept: ADMINISTRATIVE | Facility: OTHER | Age: 49
End: 2019-09-30

## 2019-10-01 ENCOUNTER — OFFICE VISIT (OUTPATIENT)
Dept: PAIN MEDICINE | Facility: CLINIC | Age: 49
End: 2019-10-01
Payer: MEDICARE

## 2019-10-01 ENCOUNTER — TELEPHONE (OUTPATIENT)
Dept: PAIN MEDICINE | Facility: CLINIC | Age: 49
End: 2019-10-01

## 2019-10-01 VITALS
BODY MASS INDEX: 24.47 KG/M2 | DIASTOLIC BLOOD PRESSURE: 81 MMHG | HEIGHT: 74 IN | WEIGHT: 190.63 LBS | HEART RATE: 75 BPM | SYSTOLIC BLOOD PRESSURE: 136 MMHG | OXYGEN SATURATION: 97 %

## 2019-10-01 DIAGNOSIS — M51.36 DDD (DEGENERATIVE DISC DISEASE), LUMBAR: ICD-10-CM

## 2019-10-01 DIAGNOSIS — M54.16 LUMBAR RADICULOPATHY: ICD-10-CM

## 2019-10-01 DIAGNOSIS — M96.1 POSTLAMINECTOMY SYNDROME OF LUMBAR REGION: ICD-10-CM

## 2019-10-01 DIAGNOSIS — M51.36 DDD (DEGENERATIVE DISC DISEASE), LUMBAR: Primary | ICD-10-CM

## 2019-10-01 DIAGNOSIS — M47.816 LUMBAR SPONDYLOSIS: ICD-10-CM

## 2019-10-01 DIAGNOSIS — M47.816 LUMBAR SPONDYLOSIS: Primary | ICD-10-CM

## 2019-10-01 PROBLEM — M51.369 DDD (DEGENERATIVE DISC DISEASE), LUMBAR: Status: ACTIVE | Noted: 2019-10-01

## 2019-10-01 PROCEDURE — 99999 PR PBB SHADOW E&M-EST. PATIENT-LVL III: ICD-10-PCS | Mod: PBBFAC,,, | Performed by: PAIN MEDICINE

## 2019-10-01 PROCEDURE — 99204 PR OFFICE/OUTPT VISIT, NEW, LEVL IV, 45-59 MIN: ICD-10-PCS | Mod: S$GLB,,, | Performed by: PAIN MEDICINE

## 2019-10-01 PROCEDURE — 99999 PR PBB SHADOW E&M-EST. PATIENT-LVL III: CPT | Mod: PBBFAC,,, | Performed by: PAIN MEDICINE

## 2019-10-01 PROCEDURE — 3008F PR BODY MASS INDEX (BMI) DOCUMENTED: ICD-10-PCS | Mod: CPTII,S$GLB,, | Performed by: PAIN MEDICINE

## 2019-10-01 PROCEDURE — 99204 OFFICE O/P NEW MOD 45 MIN: CPT | Mod: S$GLB,,, | Performed by: PAIN MEDICINE

## 2019-10-01 PROCEDURE — 3008F BODY MASS INDEX DOCD: CPT | Mod: CPTII,S$GLB,, | Performed by: PAIN MEDICINE

## 2019-10-01 NOTE — PROGRESS NOTES
Subjective:     Patient ID: Anmol Angeles Jr. is a 49 y.o. male    Chief Complaint: Low-back Pain (pt takes medication and no PT . No injections ) and Shoulder Pain      Referred by: Magda Otoole PA-C      HPI:    Initial Encounter (10/1/19):  Anmol Angeles Jr. is a 49 y.o. male who presents today with chronic bilateral low back pain that radiates the right lower extremity.  This pain has been present for many years.  No specific inciting event or injury noted.  Patient is status post low back surgery before 2005 for this issue.  He states that this surgery mostly help with his pain except for an occasional flare up that would last for about 3-4 days.  His current episode of pain has lasted for 2-3 months and involves left-sided low back and lower extremity.  He also reports some tightness in the leg.  Pain is constant worse with activity.  Pain is also worse with initiating activity after rest.  He reports diffuse numbness and tingling throughout his body.  He denies any weakness.  He denies any bowel or bladder dysfunction.    Physical Therapy:  No.    Non-pharmacologic Treatment:  Rest helps         · TENS?  No    Pain Medications:         · Currently taking:  Flexeril, ibuprofen    · Has tried in the past:  Medrol Dosepak    · Has not tried:  Opioids, Tylenol, TCAs, SNRIs, anticonvulsants, topical creams    Blood thinners:  None    Interventional Therapies:  None    Relevant Surgeries:  Previous low back surgery before 2005    Affecting sleep?  Yes    Affecting daily activities? yes    Depressive symptoms? yes          · SI/HI? No    Work status: Employed    Pain Scores:    Best:    1/10  Worst:   10/10  Usually:  4/10  Today:  6/10    Review of Systems   Constitutional: Negative for activity change, appetite change, chills, fatigue, fever and unexpected weight change.   HENT: Negative for hearing loss.    Eyes: Negative for visual disturbance.   Respiratory: Negative for chest tightness and  shortness of breath.    Cardiovascular: Negative for chest pain.   Gastrointestinal: Negative for abdominal pain, constipation, diarrhea, nausea and vomiting.   Genitourinary: Negative for difficulty urinating.   Musculoskeletal: Positive for arthralgias, back pain, gait problem, myalgias, neck pain and neck stiffness.   Skin: Negative for rash.   Neurological: Positive for numbness. Negative for dizziness, weakness, light-headedness and headaches.   Psychiatric/Behavioral: Positive for sleep disturbance. Negative for hallucinations and suicidal ideas. The patient is not nervous/anxious.        Past Medical History:   Diagnosis Date    Depressive disorder, not elsewhere classified 11/25/2014    Perirectal fistula     Seizures        Past Surgical History:   Procedure Laterality Date    BACK SURGERY      COLONOSCOPY N/A 8/22/2016    Procedure: COLONOSCOPY;  Surgeon: TROY Inman MD;  Location: [facility] (4TH FLR);  Service: Endoscopy;  Laterality: N/A;    EXAMINATION UNDER ANESTHESIA N/A 10/12/2018    Procedure: Exam under anesthesia;  Surgeon: TROY Inman MD;  Location: [facility] OR 2ND FLR;  Service: Colon and Rectal;  Laterality: N/A;    fistula surgery      HAND SURGERY Left     INSERTION OF SETON STITCH N/A 10/12/2018    Procedure: PLACEMENT, SETON STITCH;  Surgeon: TROY Inman MD;  Location: [facility] OR 2ND FLR;  Service: Colon and Rectal;  Laterality: N/A;    LEG SURGERY      plate    LEG SURGERY Right        Social History     Socioeconomic History    Marital status: Single     Spouse name: Not on file    Number of children: Not on file    Years of education: Not on file    Highest education level: Not on file   Occupational History    Occupation: Maintenance with [employer]     Employer: [employer] [place]    Social Needs    Financial resource strain: Not on file    Food insecurity:     Worry: Not on file     Inability: Not on file    Transportation needs:     Medical: Not on file  "    Non-medical: Not on file   Tobacco Use    Smoking status: Current Every Day Smoker     Packs/day: 1.50     Years: 30.00     Pack years: 45.00     Types: Cigarettes    Smokeless tobacco: Never Used   Substance and Sexual Activity    Alcohol use: Yes     Comment: beer occasionally     Drug use: Yes     Types: Marijuana    Sexual activity: Yes   Lifestyle    Physical activity:     Days per week: Not on file     Minutes per session: Not on file    Stress: Not on file   Relationships    Social connections:     Talks on phone: Not on file     Gets together: Not on file     Attends Temple service: Not on file     Active member of club or organization: Not on file     Attends meetings of clubs or organizations: Not on file     Relationship status: Not on file   Other Topics Concern    Not on file   Social History Narrative    Not on file       Review of patient's allergies indicates:   Allergen Reactions    Latex, natural rubber        Current Outpatient Medications on File Prior to Visit   Medication Sig Dispense Refill    cyclobenzaprine (FLEXERIL) 5 MG tablet Take 1 tablet (5 mg total) by mouth 3 (three) times daily as needed for Muscle spasms. 45 tablet 0    ibuprofen (ADVIL,MOTRIN) 800 MG tablet Take 1 tablet (800 mg total) by mouth 3 (three) times daily as needed for Pain. 30 tablet 0    methylPREDNISolone (MEDROL DOSEPACK) 4 mg tablet use as directed 21 tablet 0    triamcinolone acetonide 0.1% (KENALOG) 0.1 % cream Apply topically 2 (two) times daily. 80 g 1     No current facility-administered medications on file prior to visit.        Objective:      /81   Pulse 75   Ht 6' 2" (1.88 m)   Wt 86.5 kg (190 lb 9.6 oz)   SpO2 97%   BMI 24.47 kg/m²     Exam:  GEN:  Well developed, well nourished.  No acute distress.  Normal pain behavior.  HEENT:  No trauma.  Mucous membranes moist.  Nares patent bilaterally.  PSYCH: Normal affect. Thought content appropriate.  CHEST:  Breathing symmetric. "  No audible wheezing.  ABD: Soft, non-distended.  SKIN:  Warm, pink, dry.  No rash on exposed areas.    EXT:  No cyanosis, clubbing, or edema.  No color change or changes in nail or hair growth.  NEURO/MUSCULOSKELETAL:  Fully alert, oriented, and appropriate. Speech normal duc. No cranial nerve deficits.   Gait:  Normal.  No trendelenburg sign bilaterally.   Motor Strength: 5/5 motor strength throughout lower extremities.   Sensory:  No sensory deficit in the lower extremities.   Reflexes:  2 + and symmetric throughout.  Downgoing Babinski's bilaterally.  No clonus or spasticity.  L-Spine:  Limited ROM with pain on extension more than flexion.  Positive pain with axial/facet loading bilaterally.  Negative SLR bilaterally.    No TTP over lumbar paraspinals, bilateral SI joints, hips, piriformis muscles, or GTB.          Imaging:  Narrative     EXAMINATION:  MRI LUMBAR SPINE WITHOUT CONTRAST    CLINICAL HISTORY:  evaluate for lumbar annular tear, history of lumbar surgery;.  Other specified postprocedural states    TECHNIQUE:  Sagittal and axial T1 and T2 W images    COMPARISON:  Correlation is made to lumbar spine series 02/09/2015.    FINDINGS:  Sagittal images demonstrate straightening of lumbar lordosis with minimal retrolisthesis L5 on S1.  Alignment otherwise anatomic.    Disc degenerative change including desiccation and/or decreased height T12-L1 and L2-3 through L5-S1 discs.  Small multilevel anterior osteophytes noted.    L2-3, bulging of the disc is present with superimposed left paracentral protrusion resulting in moderate flattening of the adjacent ventral thecal sac and overall mild degree of canal stenosis.  Mild bilateral foraminal narrowing is also present.    L3-4, bulging of the disc is present with associated spondylosis and results in moderate effacement of the thecal sac, moderate left and mild right foraminal narrowing and overall mild to moderate degree of canal stenosis.    L4-5, mild  bulging of the disc and associated spondylosis are present with mild impression on ventral thecal sac and moderate bilateral foraminal narrowing.    L5-S1, bulging of the disc and associated spondylosis are present with secondary fairly severe bilateral foraminal narrowing.    No disc herniation, canal compromise or foraminal compromise appreciated remaining lumbar or visualized lower thoracic disc levels.  The conus medullaris has a normal contour and signal.  Marrow degenerative change adjacent to the L5-S1 disc.   Impression       Straightening of lumbar lordosis.    L2-3, bulging of the disc with superimposed left paracentral protrusion combination of findings resulting in moderate effacement ventral subarachnoid space and mild canal stenosis along with mild foraminal narrowing..    L3-4, bulging of the disc and associated spondylosis with moderate effacement ventral thecal sac and resulting in mild to moderate foraminal narrowing and overall mild to moderate degree of canal stenosis.    L4-5, mild bulging of the disc and associated spondylosis with moderate bilateral foraminal narrowing.    L5-S1, bulging of the disc and associated spondylosis with severe foraminal narrowing.      Electronically signed by: Kathy Mitchell MD  Date: 09/12/2019  Time: 11:02         Assessment:       Encounter Diagnoses   Name Primary?    DDD (degenerative disc disease), lumbar Yes    Lumbar spondylosis     Lumbar radiculopathy     Postlaminectomy syndrome of lumbar region          Plan:       Anmol was seen today for low-back pain and shoulder pain.    Diagnoses and all orders for this visit:    DDD (degenerative disc disease), lumbar    Lumbar spondylosis    Lumbar radiculopathy    Postlaminectomy syndrome of lumbar region        Anmol Angeles  is a 49 y.o. male with chronic bilateral low back pain that radiates to the bilateral lower extremities.  This is a chronic problem but usually only involve the right side.   Patient states that pain has more recently began to involve the left side.  Pain is likely multifactorial.  Has indications of both lumbar facet pain and possibly pain from spinal stenosis or radiculopathy.  Lumbar MRI with multilevel disc degeneration and multilevel foraminal stenosis with multilevel spinal stenosis as well.    1.  Pertinent imaging studies reviewed by me. Imaging results were discussed with patient.  2.  Schedule for caudal epidural steroid injection at [facility].  3.  Return to clinic 2 weeks after procedure to discuss results.  May consider bilateral lumbar medial branch block/RFA if appropriate.

## 2019-10-01 NOTE — TELEPHONE ENCOUNTER
Spoke with patient about scheduling a caudal STEPHENIE with Dr Grady at [facility]. He said he would like to proceed with setting a date to do the procedure. Patient agreed to 10/10/19, made him aware that he would be contacted by pre op with his time to arrive the morning of the procedure, instructed to have nothing after midnight the night before the procedure, and he must have an adult escort him to leave after the procedure is completed. Patient verbalized understanding of the information provided to him. No further issues discussed.

## 2019-10-01 NOTE — LETTER
October 1, 2019      Magda Otoole PA-C  [address] [facility] [address]           [facility]  [address], [address]  [place] [address]  Phone: [phone]  Fax: [phone]          Patient: Anmol Anegles Jr.   MR Number: [id number]   YOB: 1970   Date of Visit: 10/1/2019       Dear Magda tOoole:    Thank you for referring Anmol Angeles to me for evaluation. Attached you will find relevant portions of my assessment and plan of care.    If you have questions, please do not hesitate to call me. I look forward to following Anmol Angeles along with you.    Sincerely,    Amol Grady Jr., MD    Enclosure  CC:  No Recipients    If you would like to receive this communication electronically, please contact [email or web address] or [phone] to request more information on [employer] access.    For providers and/or their staff who would like to refer a patient to [facility], please contact us through our one-stop-shop provider referral line, [facility], at [phone].    If you feel you have received this communication in error or would no longer like to receive these types of communications, please e-mail [email or web address]

## 2019-10-01 NOTE — TELEPHONE ENCOUNTER
----- Message from Amol Grady Jr., MD sent at 10/1/2019  1:45 PM CDT -----  Could you please schedule this patient for a caudal epidural steroid injection?  Thanks.

## 2019-10-10 ENCOUNTER — TELEPHONE (OUTPATIENT)
Dept: INTERNAL MEDICINE | Facility: CLINIC | Age: 49
End: 2019-10-10

## 2019-10-10 ENCOUNTER — TELEPHONE (OUTPATIENT)
Dept: TRANSPLANT | Facility: CLINIC | Age: 49
End: 2019-10-10

## 2019-10-10 DIAGNOSIS — M51.36 DDD (DEGENERATIVE DISC DISEASE), LUMBAR: ICD-10-CM

## 2019-10-10 DIAGNOSIS — M54.16 LUMBAR RADICULOPATHY: ICD-10-CM

## 2019-10-10 DIAGNOSIS — M47.816 LUMBAR SPONDYLOSIS: Primary | ICD-10-CM

## 2019-10-10 RX ORDER — IBUPROFEN 800 MG/1
800 TABLET ORAL 3 TIMES DAILY PRN
Qty: 30 TABLET | Refills: 0 | Status: SHIPPED | OUTPATIENT
Start: 2019-10-10 | End: 2019-12-12 | Stop reason: SDUPTHER

## 2019-10-10 NOTE — TELEPHONE ENCOUNTER
Pt called re multiple referrals to hepatology during the year and letters and calls without success.  Attempted to call again, unable to lvm. On second call, pt answered. I asked if he is interested in an appt with us. Pt stated he has more pressing things going on and looking at his labs he feels he can change his diet. He feels the referral is too much at one time with all he has going on.  He wants to fix his back first.

## 2019-10-10 NOTE — TELEPHONE ENCOUNTER
----- Message from Rylie Muñoz sent at 10/10/2019 11:34 AM CDT -----  Pt called re multiple referrals to hepatology during the year and letters and calls without success.  Attempted to call again, unable to lvm. On second call, pt answered. I asked if he is interested in an appt with us. Pt stated he has more pressing things going on and looking at his labs he feels he can change his diet. He feels the referral is too much at one time with all he has going on.  He wants to fix his back first.     Referred removed.

## 2019-10-23 ENCOUNTER — OFFICE VISIT (OUTPATIENT)
Dept: INTERNAL MEDICINE | Facility: CLINIC | Age: 49
End: 2019-10-23
Payer: MEDICARE

## 2019-10-23 ENCOUNTER — PATIENT OUTREACH (OUTPATIENT)
Dept: ADMINISTRATIVE | Facility: OTHER | Age: 49
End: 2019-10-23

## 2019-10-23 VITALS
WEIGHT: 195.31 LBS | BODY MASS INDEX: 25.07 KG/M2 | HEIGHT: 74 IN | SYSTOLIC BLOOD PRESSURE: 134 MMHG | DIASTOLIC BLOOD PRESSURE: 84 MMHG | OXYGEN SATURATION: 99 % | HEART RATE: 79 BPM

## 2019-10-23 DIAGNOSIS — Z72.0 TOBACCO USE: ICD-10-CM

## 2019-10-23 DIAGNOSIS — R03.0 PREHYPERTENSION: ICD-10-CM

## 2019-10-23 DIAGNOSIS — M54.2 CHRONIC NECK PAIN: ICD-10-CM

## 2019-10-23 DIAGNOSIS — G89.29 CHRONIC NECK PAIN: ICD-10-CM

## 2019-10-23 DIAGNOSIS — K60.3 ANAL FISTULA: ICD-10-CM

## 2019-10-23 DIAGNOSIS — G40.909 SEIZURE DISORDER: ICD-10-CM

## 2019-10-23 DIAGNOSIS — M47.816 LUMBAR SPONDYLOSIS: Primary | ICD-10-CM

## 2019-10-23 DIAGNOSIS — M96.1 POSTLAMINECTOMY SYNDROME OF LUMBAR REGION: ICD-10-CM

## 2019-10-23 DIAGNOSIS — R79.89 ELEVATED LFTS: ICD-10-CM

## 2019-10-23 DIAGNOSIS — M54.16 LUMBAR RADICULOPATHY: ICD-10-CM

## 2019-10-23 PROBLEM — D72.829 LEUKOCYTOSIS: Status: RESOLVED | Noted: 2018-12-11 | Resolved: 2019-10-23

## 2019-10-23 PROCEDURE — 3008F PR BODY MASS INDEX (BMI) DOCUMENTED: ICD-10-PCS | Mod: CPTII,S$GLB,, | Performed by: INTERNAL MEDICINE

## 2019-10-23 PROCEDURE — 3008F BODY MASS INDEX DOCD: CPT | Mod: CPTII,S$GLB,, | Performed by: INTERNAL MEDICINE

## 2019-10-23 PROCEDURE — 99999 PR PBB SHADOW E&M-EST. PATIENT-LVL IV: CPT | Mod: PBBFAC,,, | Performed by: INTERNAL MEDICINE

## 2019-10-23 PROCEDURE — 99214 OFFICE O/P EST MOD 30 MIN: CPT | Mod: S$GLB,,, | Performed by: INTERNAL MEDICINE

## 2019-10-23 PROCEDURE — 99999 PR PBB SHADOW E&M-EST. PATIENT-LVL IV: ICD-10-PCS | Mod: PBBFAC,,, | Performed by: INTERNAL MEDICINE

## 2019-10-23 PROCEDURE — 99214 PR OFFICE/OUTPT VISIT, EST, LEVL IV, 30-39 MIN: ICD-10-PCS | Mod: S$GLB,,, | Performed by: INTERNAL MEDICINE

## 2019-10-23 RX ORDER — METHOCARBAMOL 500 MG/1
500 TABLET, FILM COATED ORAL 4 TIMES DAILY
Qty: 40 TABLET | Refills: 0 | Status: SHIPPED | OUTPATIENT
Start: 2019-10-23 | End: 2019-11-02

## 2019-10-23 NOTE — PROGRESS NOTES
"INTERNAL MEDICINE ESTABLISHED PATIENT VISIT NOTE    Subjective:     Chief Complaint: Follow-up  chronic pain issues  Elevated LFTs     Patient ID: Anmol Angeles Jr. is a 49 y.o. male with anxiety c depression, chronic neck and back pain, hx R sided cervical radiculopathy, hx anal fistula (mult surgeries by CRS in the past, see initial H&P), sinus bradycardia, hx IVDU, tob use, hx head injury about 4 years ago due to "epileptic seizure" c seizure d/o in the past (pt reports he thinks "episodes" related to anxiety but had episodes of LOC that he says was told were seizures; last episode was 4 years ago, has refused all seizure meds in the past, has chronic vertigo and tinnitus associated), had hx subdural hematoma, elevated LFTs c EtOH use, last seen by me 12/2018 to [facility], here today for f/u.    At last appt c me, had some issues c chronic neck and back pain.  Was referred to spine clinic but did not f/u.  States Gabapentin rx'ed at that time did not help (100 tid).  Was then seen by Dr. Baeza this past summer for progressive low back pain and again referred to spine clinic.  Seen by Magda Otoole and then Dr. Grady in pain mgmt and s/p STEPHENIE 2 weeks ago which he states did help.  Was supposed to f/u 2 weeks later but says no one called him.  Says Flexeril makes him "feel he can do things with his back that he can't" and wants to try another mm relaxer.      Has also had some persistent elevated LFTs but denies issues c EtOH.  States he does not drink regularly but will intermittently drink several drinks at a time, last had several beers yesterday and declines evaluation by hepatology at this time.    Past Medical History:  Past Medical History:   Diagnosis Date    Anxiety     Back pain     Depressive disorder, not elsewhere classified 11/25/2014    Perirectal fistula     Seizures        Home Medications:  Prior to Admission medications    Medication Sig Start Date End Date Taking? Authorizing Provider "   cyclobenzaprine (FLEXERIL) 5 MG tablet Take 1 tablet (5 mg total) by mouth 3 (three) times daily as needed for Muscle spasms. 8/27/19  Yes Efrain Baeza MD   ibuprofen (ADVIL,MOTRIN) 800 MG tablet Take 1 tablet (800 mg total) by mouth 3 (three) times daily as needed for Pain. 10/10/19 1/8/20 Yes Amol Grady Jr., MD   methylPREDNISolone (MEDROL DOSEPACK) 4 mg tablet use as directed 8/27/19  Yes Efrain Baeza MD   triamcinolone acetonide 0.1% (KENALOG) 0.1 % cream Apply topically 2 (two) times daily. 8/27/19  Yes Efrain Baeza MD       Allergies:  Review of patient's allergies indicates:   Allergen Reactions    Latex, natural rubber        Social History:  Social History     Tobacco Use    Smoking status: Current Every Day Smoker     Packs/day: 1.50     Years: 30.00     Pack years: 45.00     Types: Cigarettes    Smokeless tobacco: Never Used   Substance Use Topics    Alcohol use: Yes     Comment: beer occasionally     Drug use: Yes     Types: Marijuana        Review of Systems   Constitutional: Negative for chills, fatigue and fever.   HENT: Positive for tinnitus (chronic, intermittent).    Respiratory: Negative for cough, chest tightness and shortness of breath.    Cardiovascular: Negative for chest pain.   Gastrointestinal: Negative for abdominal pain and blood in stool.   Genitourinary: Negative for dysuria and frequency.   Musculoskeletal: Positive for arthralgias, back pain and neck pain.   Neurological: Negative for dizziness.         Health Maintenance:     Immunizations:   Influenza, TDap, and Pneumovax all rec but declined by pt, Risks and benefits were discussed with patient.  Shingrix rec once back in stock     Cancer Screening:  Colonoscopy:  8/2016 c polyp in ascending colon removed, hemorrhoids, perianal fistula; path c adenomatous polyp c rec f/u in 5 yrs.    Objective:   /84 (BP Location: Left arm, Patient Position: Sitting, BP Method: Medium (Manual))   Pulse 79  "  Ht 6' 2" (1.88 m)   Wt 88.6 kg (195 lb 5.2 oz)   SpO2 99%   BMI 25.08 kg/m²        General: AAO x3, no apparent distress  HEENT: PERRL, OP clear  CV: RRR, no m/r/g  Pulm: Lungs CTAB, no crackles, no wheezes  Abd: s/NT/ND +BS  Extremities: no c/c/e    Labs:     Lab Results   Component Value Date    WBC 7.76 12/18/2018    HGB 16.0 12/18/2018    HCT 50.1 12/18/2018    MCV 93 12/18/2018     12/18/2018     Sodium   Date Value Ref Range Status   12/18/2018 139 136 - 145 mmol/L Final     Potassium   Date Value Ref Range Status   12/18/2018 5.3 (H) 3.5 - 5.1 mmol/L Final     Chloride   Date Value Ref Range Status   12/18/2018 101 95 - 110 mmol/L Final     CO2   Date Value Ref Range Status   12/18/2018 28 23 - 29 mmol/L Final     Glucose   Date Value Ref Range Status   12/18/2018 104 70 - 110 mg/dL Final     BUN, Bld   Date Value Ref Range Status   12/18/2018 12 6 - 20 mg/dL Final     Creatinine   Date Value Ref Range Status   12/18/2018 0.8 0.5 - 1.4 mg/dL Final     Calcium   Date Value Ref Range Status   12/18/2018 9.7 8.7 - 10.5 mg/dL Final     Total Protein   Date Value Ref Range Status   12/18/2018 7.4 6.0 - 8.4 g/dL Final     Albumin   Date Value Ref Range Status   12/18/2018 4.1 3.5 - 5.2 g/dL Final     Total Bilirubin   Date Value Ref Range Status   12/18/2018 0.5 0.1 - 1.0 mg/dL Final     Comment:     For infants and newborns, interpretation of results should be based  on gestational age, weight and in agreement with clinical  observations.  Premature Infant recommended reference ranges:  Up to 24 hours.............<8.0 mg/dL  Up to 48 hours............<12.0 mg/dL  3-5 days..................<15.0 mg/dL  6-29 days.................<15.0 mg/dL       Alkaline Phosphatase   Date Value Ref Range Status   12/18/2018 93 55 - 135 U/L Final     AST   Date Value Ref Range Status   12/18/2018 50 (H) 10 - 40 U/L Final     ALT   Date Value Ref Range Status   12/18/2018 67 (H) 10 - 44 U/L Final     Anion Gap   Date " Value Ref Range Status   12/18/2018 10 8 - 16 mmol/L Final     eGFR if    Date Value Ref Range Status   12/18/2018 >60 >60 mL/min/1.73 m^2 Final     eGFR if non    Date Value Ref Range Status   12/18/2018 >60 >60 mL/min/1.73 m^2 Final     Comment:     Calculation used to obtain the estimated glomerular filtration  rate (eGFR) is the CKD-EPI equation.        No results found for: LABA1C, HGBA1C  Lab Results   Component Value Date    LDLCALC 171.2 (H) 12/18/2018     Lab Results   Component Value Date    TSH 0.457 12/18/2018         Assessment/Plan     Anmol was seen today for follow-up.    Diagnoses and all orders for this visit:    Lumbar spondylosis  Postlaminectomy syndrome of lumbar region  Lumbar radiculopathy  Chronic neck pain  As per HPI, currently mostly issues c lower back.  rec f/u c pain mgmt, will have checkout schedule  Ok to try robaxin instead of flexeril  Offered higher dose of gabapentin which did not help at lower doses but pt declined.  Avoiding cymbalta based on sz hx.  -     methocarbamol (ROBAXIN) 500 MG Tab; Take 1 tablet (500 mg total) by mouth 4 (four) times daily for 10 days  -     Ambulatory Referral to Pain Clinic  -     Ambulatory consult to Physical Therap      Seizure disorder  As per HPI  No recent sz    Anal fistula  Seen by CRS in the past, states Seton clamp came out earlier this year but sx have not been bothersome and declines f/u c CRS at this time.    Tobacco use  Patient counseled on the need to stop smoking.    Elevated LFTs  Suspect 2/2 EtOH; however, pt denies heavy use.  U/s c hepatomegaly.  Viral hep panel neg in the past.  rec f/u c Hepatology and f/u labs which pt declines at this time.  Advised to call me when ready.    Prehypertension  Still borderline  Not req meds  Recommend low sodium diet, < 2g Na/day as well as weight loss and exercise.    HM as above  RTC in 6 mos, sooner if needed.    Katie Rhodes MD  Department of Internal Medicine  - [facility]  10/23/2019

## 2019-10-24 ENCOUNTER — TELEPHONE (OUTPATIENT)
Dept: PAIN MEDICINE | Facility: CLINIC | Age: 49
End: 2019-10-24

## 2019-10-24 NOTE — TELEPHONE ENCOUNTER
Reminded patient of Pain Management appointment scheduled for tomorrow at 1015 with Dr. Grady- verbal confirmation received.  Location information also provided.

## 2019-10-28 ENCOUNTER — LAB VISIT (OUTPATIENT)
Dept: LAB | Facility: HOSPITAL | Age: 49
End: 2019-10-28
Attending: FAMILY MEDICINE
Payer: MEDICARE

## 2019-10-28 DIAGNOSIS — R16.0 HEPATOMEGALY: ICD-10-CM

## 2019-10-28 DIAGNOSIS — R93.2 ABNORMAL FINDINGS ON DIAGNOSTIC IMAGING OF LIVER AND BILIARY TRACT: ICD-10-CM

## 2019-10-28 DIAGNOSIS — R74.8 ABNORMAL TRANSAMINASES: ICD-10-CM

## 2019-10-28 DIAGNOSIS — R94.5 ABNORMAL RESULTS OF LIVER FUNCTION STUDIES: ICD-10-CM

## 2019-10-28 LAB
AFP SERPL-MCNC: 3.4 NG/ML (ref 0–8.4)
ALBUMIN SERPL BCP-MCNC: 4.4 G/DL (ref 3.5–5.2)
ALP SERPL-CCNC: 109 U/L (ref 55–135)
ALT SERPL W/O P-5'-P-CCNC: 52 U/L (ref 10–44)
AST SERPL-CCNC: 30 U/L (ref 10–40)
BILIRUB DIRECT SERPL-MCNC: 0.3 MG/DL (ref 0.1–0.3)
BILIRUB SERPL-MCNC: 0.9 MG/DL (ref 0.1–1)
CERULOPLASMIN SERPL-MCNC: 38 MG/DL (ref 15–45)
FERRITIN SERPL-MCNC: 69 NG/ML (ref 20–300)
IGG SERPL-MCNC: 862 MG/DL (ref 650–1600)
IRON SERPL-MCNC: 178 UG/DL (ref 45–160)
PROT SERPL-MCNC: 7.9 G/DL (ref 6–8.4)
SATURATED IRON: 39 % (ref 20–50)
TOTAL IRON BINDING CAPACITY: 453 UG/DL (ref 250–450)
TRANSFERRIN SERPL-MCNC: 306 MG/DL (ref 200–375)

## 2019-10-28 PROCEDURE — 86256 FLUORESCENT ANTIBODY TITER: CPT | Mod: 91

## 2019-10-28 PROCEDURE — 82784 ASSAY IGA/IGD/IGG/IGM EACH: CPT

## 2019-10-28 PROCEDURE — 36415 COLL VENOUS BLD VENIPUNCTURE: CPT

## 2019-10-28 PROCEDURE — 80076 HEPATIC FUNCTION PANEL: CPT

## 2019-10-28 PROCEDURE — 80321 ALCOHOLS BIOMARKERS 1OR 2: CPT

## 2019-10-28 PROCEDURE — 82105 ALPHA-FETOPROTEIN SERUM: CPT

## 2019-10-28 PROCEDURE — 83540 ASSAY OF IRON: CPT

## 2019-10-28 PROCEDURE — 86038 ANTINUCLEAR ANTIBODIES: CPT

## 2019-10-28 PROCEDURE — 82728 ASSAY OF FERRITIN: CPT

## 2019-10-28 PROCEDURE — 86235 NUCLEAR ANTIGEN ANTIBODY: CPT

## 2019-10-28 PROCEDURE — 82104 ALPHA-1-ANTITRYPSIN PHENO: CPT

## 2019-10-28 PROCEDURE — 82390 ASSAY OF CERULOPLASMIN: CPT

## 2019-10-29 LAB
ANA SER QL IF: NORMAL
MITOCHONDRIA AB TITR SER IF: NORMAL {TITER}

## 2019-10-30 LAB — SMOOTH MUSCLE AB TITR SER IF: ABNORMAL {TITER}

## 2019-11-01 LAB — PHOSPHATIDYLETHANOL (PETH): 113 NG/ML

## 2019-11-04 LAB
A1AT PHENOTYP SERPL-IMP: NORMAL BANDS
A1AT SERPL NEPH-MCNC: 119 MG/DL (ref 100–190)

## 2019-11-06 ENCOUNTER — TELEPHONE (OUTPATIENT)
Dept: INTERNAL MEDICINE | Facility: CLINIC | Age: 49
End: 2019-11-06

## 2019-11-07 NOTE — TELEPHONE ENCOUNTER
Please call patient and explain that some of the labs on his recent blood work for liver were out of range.  I suggest that he be evaluated in the liver clinic even though the abnormalities were relatively mild. Please see referral orders and please call patient to schedule a consult with hepatology. Thank you.    Cc: PCP

## 2019-11-07 NOTE — TELEPHONE ENCOUNTER
"Spoke with pt, notified of results. Offered appointment with hepatology, pt denied. Pt got really upset and began screaming stating he is not worried about his liver. He wishes we stop calling about his liver. He does not want to see anyone regarding this. Want message to be sent to providers so they can know. Pt says "dont even test his liver anymore"     "

## 2019-11-14 ENCOUNTER — NURSE TRIAGE (OUTPATIENT)
Dept: ADMINISTRATIVE | Facility: CLINIC | Age: 49
End: 2019-11-14

## 2019-11-14 NOTE — TELEPHONE ENCOUNTER
Nauseated and having dizziness.  Symptoms began yesterday after physical therapy yesterday.  If he keeps his eyes closed and is still he does not feel dizzy, but the nausea persists.  He can walk but has to be very careful and has to hold onto things or he feels like he's going to fall forward.  Attempted to reach Dr. Rhodes's office, but was unsuccessful, advised ED now for evaluation.    Reason for Disposition   SEVERE dizziness (vertigo) (e.g., unable to walk without assistance)    Additional Information   Negative: [1] Weakness (i.e., paralysis, loss of muscle strength) of the face, arm or leg on one side of the body AND [2] sudden onset AND [3] present now   Negative: [1] Numbness (i.e., loss of sensation) of the face, arm or leg on one side of the body AND [2] sudden onset AND [3] present now   Negative: [1] Loss of speech or garbled speech AND [2] sudden onset AND [3] present now   Negative: Difficult to awaken or acting confused (e.g., disoriented, slurred speech)   Negative: Sounds like a life-threatening emergency to the triager   Negative: Followed a head injury   Negative: Followed an ear injury   Negative: Localized weakness or numbness is main symptom   Negative: Dizziness relates to riding in a car, going to an amusement park, etc.   Negative: [1] Dizziness is main symptom AND [2] NO spinning sensation (i.e., vertigo)    Protocols used: DIZZINESS - VERTIGO-A-

## 2019-11-18 ENCOUNTER — TELEPHONE (OUTPATIENT)
Dept: INTERNAL MEDICINE | Facility: CLINIC | Age: 49
End: 2019-11-18

## 2019-11-18 DIAGNOSIS — M47.816 LUMBAR SPONDYLOSIS: Primary | ICD-10-CM

## 2019-11-18 NOTE — TELEPHONE ENCOUNTER
----- Message from Zenaida Carolina sent at 11/18/2019  3:49 PM CST -----  Contact: Patient [phone]  Patient is requesting a call about getting a  release to continue his therapy. Patient doesn't want to miss anymore.    Please call and advise.    Thank You

## 2019-11-18 NOTE — TELEPHONE ENCOUNTER
Pt states he need new orders in the system to continue rehab at [facility] for his back, pt went to the ER so therapy was d/c until new orders are put in the system

## 2019-11-19 ENCOUNTER — TELEPHONE (OUTPATIENT)
Dept: INTERNAL MEDICINE | Facility: CLINIC | Age: 49
End: 2019-11-19

## 2019-11-19 NOTE — TELEPHONE ENCOUNTER
----- Message from Sara Henry sent at 11/19/2019  1:35 PM CST -----  Elías Sexton,  Mr Angeles called our office to see if we received any clearance for him to return to therapy. I told him I did not see anything but that I would sent you a message to try and get I put through.  Thank You,  Sara VENTURA

## 2019-11-19 NOTE — TELEPHONE ENCOUNTER
----- Message from Tati Phan sent at 11/19/2019  1:36 PM CST -----  Contact: self/ [phone]  Patient called in regards needing to talk with Dr Rhodes nurse about order for therapy hasn't been place in the system. Please call and advise. Thank you.

## 2019-11-22 ENCOUNTER — TELEPHONE (OUTPATIENT)
Dept: PAIN MEDICINE | Facility: CLINIC | Age: 49
End: 2019-11-22

## 2019-11-22 NOTE — TELEPHONE ENCOUNTER
Attempted to contact patient to remind him of appt scheduled on Monday at 1:45pm.  No answer and the VM message didn't match his name so no information was left.

## 2019-11-24 ENCOUNTER — PATIENT OUTREACH (OUTPATIENT)
Dept: ADMINISTRATIVE | Facility: OTHER | Age: 49
End: 2019-11-24

## 2019-11-25 ENCOUNTER — OFFICE VISIT (OUTPATIENT)
Dept: PAIN MEDICINE | Facility: CLINIC | Age: 49
End: 2019-11-25
Payer: MEDICARE

## 2019-11-25 VITALS
DIASTOLIC BLOOD PRESSURE: 75 MMHG | OXYGEN SATURATION: 98 % | SYSTOLIC BLOOD PRESSURE: 142 MMHG | HEIGHT: 74 IN | HEART RATE: 80 BPM | BODY MASS INDEX: 25.04 KG/M2 | WEIGHT: 195.13 LBS

## 2019-11-25 DIAGNOSIS — I69.198 VERTIGO DUE TO OLD INTRACEREBRAL HEMORRHAGE: ICD-10-CM

## 2019-11-25 DIAGNOSIS — M96.1 POSTLAMINECTOMY SYNDROME OF LUMBAR REGION: ICD-10-CM

## 2019-11-25 DIAGNOSIS — M54.16 LUMBAR RADICULOPATHY: ICD-10-CM

## 2019-11-25 DIAGNOSIS — M47.816 LUMBAR SPONDYLOSIS: ICD-10-CM

## 2019-11-25 DIAGNOSIS — M51.36 DDD (DEGENERATIVE DISC DISEASE), LUMBAR: ICD-10-CM

## 2019-11-25 DIAGNOSIS — M50.30 DDD (DEGENERATIVE DISC DISEASE), CERVICAL: Primary | ICD-10-CM

## 2019-11-25 DIAGNOSIS — R42 VERTIGO DUE TO OLD INTRACEREBRAL HEMORRHAGE: ICD-10-CM

## 2019-11-25 DIAGNOSIS — M47.812 CERVICAL SPONDYLOSIS: ICD-10-CM

## 2019-11-25 PROCEDURE — 99214 PR OFFICE/OUTPT VISIT, EST, LEVL IV, 30-39 MIN: ICD-10-PCS | Mod: S$GLB,,, | Performed by: PAIN MEDICINE

## 2019-11-25 PROCEDURE — 99999 PR PBB SHADOW E&M-EST. PATIENT-LVL III: ICD-10-PCS | Mod: PBBFAC,,, | Performed by: PAIN MEDICINE

## 2019-11-25 PROCEDURE — 3008F PR BODY MASS INDEX (BMI) DOCUMENTED: ICD-10-PCS | Mod: CPTII,S$GLB,, | Performed by: PAIN MEDICINE

## 2019-11-25 PROCEDURE — 99214 OFFICE O/P EST MOD 30 MIN: CPT | Mod: S$GLB,,, | Performed by: PAIN MEDICINE

## 2019-11-25 PROCEDURE — 3008F BODY MASS INDEX DOCD: CPT | Mod: CPTII,S$GLB,, | Performed by: PAIN MEDICINE

## 2019-11-25 PROCEDURE — 99999 PR PBB SHADOW E&M-EST. PATIENT-LVL III: CPT | Mod: PBBFAC,,, | Performed by: PAIN MEDICINE

## 2019-11-25 RX ORDER — METHOCARBAMOL 500 MG/1
500 TABLET, FILM COATED ORAL 4 TIMES DAILY PRN
Qty: 120 TABLET | Refills: 2 | Status: SHIPPED | OUTPATIENT
Start: 2019-11-25 | End: 2020-06-26

## 2019-11-25 NOTE — PROGRESS NOTES
Subjective:     Patient ID: Anmol Angeles Jr. is a 49 y.o. male    Chief Complaint: Neck Pain and Back Pain      Referred by: Katie Rhodes MD      HPI:    Interval History (11/25/19):  He returns today for follow up.  He reports that caudal epidural steroid injection has not been helpful for the low back pain.  He denies any changes in the quality or location was low back pain.  He attended physical therapy for his low back during of his sessions began to have some mild neck pain. This pain developed into severe neck pain and stiffness with associated vertigo.  Patient was evaluated emergency room.  Patient states pain is interfering with his sleep.  He denies any numbness, tingling, weakness, bowel bladder dysfunction.  He states that he has taken Robaxin and this medicine help with his pain and help him to relax of aching sleep.      Initial Encounter (10/1/19):  Anmol Angeles Jr. is a 49 y.o. male who presents today with chronic bilateral low back pain that radiates the right lower extremity.  This pain has been present for many years.  No specific inciting event or injury noted.  Patient is status post low back surgery before 2005 for this issue.  He states that this surgery mostly help with his pain except for an occasional flare up that would last for about 3-4 days.  His current episode of pain has lasted for 2-3 months and involves left-sided low back and lower extremity.  He also reports some tightness in the leg.  Pain is constant worse with activity.  Pain is also worse with initiating activity after rest.  He reports diffuse numbness and tingling throughout his body.  He denies any weakness.  He denies any bowel or bladder dysfunction.    Physical Therapy:  Yes.    Non-pharmacologic Treatment:  Rest helps         · TENS?  No    Pain Medications:         · Currently taking:  ibuprofen    · Has tried in the past:  Medrol Dosepak, Flexeril, Robaxin    · Has not tried:  Opioids, Tylenol, TCAs,  SNRIs, anticonvulsants, topical creams    Blood thinners:  None    Interventional Therapies:    10/10/19 - caudal epidural steroid injection - no relief noted    Relevant Surgeries:  Previous low back surgery before 2005    Affecting sleep?  Yes    Affecting daily activities? yes    Depressive symptoms? yes          · SI/HI? No    Work status: Employed    Pain Scores:    Best:    1/10  Worst:   10/10  Usually:  4/10  Today:  6/10    Review of Systems   Constitutional: Negative for activity change, appetite change, chills, fatigue, fever and unexpected weight change.   HENT: Negative for hearing loss.    Eyes: Negative for visual disturbance.   Respiratory: Negative for chest tightness and shortness of breath.    Cardiovascular: Negative for chest pain.   Gastrointestinal: Negative for abdominal pain, constipation, diarrhea, nausea and vomiting.   Genitourinary: Negative for difficulty urinating.   Musculoskeletal: Positive for arthralgias, back pain, gait problem, myalgias, neck pain and neck stiffness.   Skin: Negative for rash.   Neurological: Positive for numbness. Negative for dizziness, weakness, light-headedness and headaches.   Psychiatric/Behavioral: Positive for sleep disturbance. Negative for hallucinations and suicidal ideas. The patient is not nervous/anxious.        Past Medical History:   Diagnosis Date    Anxiety     Back pain     Depressive disorder, not elsewhere classified 11/25/2014    Perirectal fistula     Seizures        Past Surgical History:   Procedure Laterality Date    BACK SURGERY      CAUDAL EPIDURAL STEROID INJECTION N/A 10/10/2019    Procedure: Injection-steroid-epidural-caudal;  Surgeon: Amol Grady Jr., MD;  Location: [facility] OR;  Service: Pain Management;  Laterality: N/A;    COLONOSCOPY N/A 8/22/2016    Procedure: COLONOSCOPY;  Surgeon: TROY Inman MD;  Location: [address]);  Service: Endoscopy;  Laterality: N/A;    EXAMINATION UNDER ANESTHESIA N/A  10/12/2018    Procedure: Exam under anesthesia;  Surgeon: TROY Inman MD;  Location: [facility] OR 2ND FLR;  Service: Colon and Rectal;  Laterality: N/A;    fistula surgery      HAND SURGERY Left     INSERTION OF SETON STITCH N/A 10/12/2018    Procedure: PLACEMENT, SETON STITCH;  Surgeon: TROY Inman MD;  Location: [facility] OR 2ND FLR;  Service: Colon and Rectal;  Laterality: N/A;    LEG SURGERY      plate    LEG SURGERY Right        Social History     Socioeconomic History    Marital status: Single     Spouse name: Not on file    Number of children: Not on file    Years of education: Not on file    Highest education level: Not on file   Occupational History    Occupation: Maintenance with [employer]     Employer: [employer]    Social Needs    Financial resource strain: Not on file    Food insecurity:     Worry: Not on file     Inability: Not on file    Transportation needs:     Medical: Not on file     Non-medical: Not on file   Tobacco Use    Smoking status: Current Every Day Smoker     Packs/day: 1.50     Years: 30.00     Pack years: 45.00     Types: Cigarettes    Smokeless tobacco: Never Used   Substance and Sexual Activity    Alcohol use: Yes     Comment: beer occasionally     Drug use: Yes     Types: Marijuana    Sexual activity: Yes   Lifestyle    Physical activity:     Days per week: Not on file     Minutes per session: Not on file    Stress: Not on file   Relationships    Social connections:     Talks on phone: Not on file     Gets together: Not on file     Attends Christian service: Not on file     Active member of club or organization: Not on file     Attends meetings of clubs or organizations: Not on file     Relationship status: Not on file   Other Topics Concern    Not on file   Social History Narrative    Not on file       Review of patient's allergies indicates:   Allergen Reactions    Latex, natural rubber        Current Outpatient Medications on File Prior to Visit  "  Medication Sig Dispense Refill    ibuprofen (ADVIL,MOTRIN) 800 MG tablet Take 1 tablet (800 mg total) by mouth 3 (three) times daily as needed for Pain. 30 tablet 0    meclizine (ANTIVERT) 12.5 mg tablet Take 1 tablet (12.5 mg total) by mouth 3 (three) times daily as needed for Dizziness. 20 tablet 0    triamcinolone acetonide 0.1% (KENALOG) 0.1 % cream Apply topically 2 (two) times daily. 80 g 1    [DISCONTINUED] methylPREDNISolone (MEDROL DOSEPACK) 4 mg tablet use as directed (Patient not taking: Reported on 11/25/2019) 21 tablet 0     No current facility-administered medications on file prior to visit.        Objective:      BP (!) 142/75   Pulse 80   Ht 6' 2" (1.88 m)   Wt 88.5 kg (195 lb 1.6 oz)   SpO2 98%   BMI 25.05 kg/m²     Exam:  GEN:  Well developed, well nourished.  No acute distress.   HEENT:  No trauma.  Mucous membranes moist.  Nares patent bilaterally.  PSYCH: Normal affect. Thought content appropriate.  CHEST:  Breathing symmetric.  No audible wheezing.  ABD: Soft, non-distended.  SKIN:  Warm, pink, dry.  No rash on exposed areas.    EXT:  No cyanosis, clubbing, or edema.  No color change or changes in nail or hair growth.  NEURO/MUSCULOSKELETAL:  Fully alert, oriented, and appropriate. Speech normal duc. No cranial nerve deficits.   Gait:  Normal.  No focal motor deficits.     Grossly limited C-spine range of motion        Imaging:      Narrative     EXAMINATION:  CT CERVICAL SPINE WITHOUT CONTRAST    CLINICAL HISTORY:  neck pain following manipulation;    TECHNIQUE:  Axial images obtained of the cervical spine with sagittal and coronal reformatted images then generated.    COMPARISON:  Cervical spine series 02/09/2015.    FINDINGS:  No acute fracture or malalignment cervical spine.  Minimal reversal upper cervical curvature may simply be positional or perhaps related to muscle spasm.    Degenerative loss of height C5-6 and C6-7 discs.    C3-4, mild annular bulging and mild " associated posterior spondylosis are present.  Mild left facet arthropathy.    C4-5, mild posterior spondylosis and bulging of the disc.  Mild uncinate hypertrophy.    C5-6, posterior spondylosis and bulging of the disc are present with uncinate hypertrophy and moderate foraminal narrowing.    C6-7, mild posterior spondylosis and bulging of the disc.  Mild uncinate hypertrophy and mild foraminal narrowing.    C7-T1, mild facet arthropathy.    Prevertebral soft tissues unremarkable.  Visualized lung apices demonstrate mild emphysematous change.   Impression       No acute fracture or malalignment cervical spine.    Multilevel degenerative change as above.    The preliminary and final reports are concordant.      Electronically signed by: Kathy Mitchell MD  Date: 11/15/2019  Time: 06:52               Narrative     EXAMINATION:  MRI LUMBAR SPINE WITHOUT CONTRAST    CLINICAL HISTORY:  evaluate for lumbar annular tear, history of lumbar surgery;.  Other specified postprocedural states    TECHNIQUE:  Sagittal and axial T1 and T2 W images    COMPARISON:  Correlation is made to lumbar spine series 02/09/2015.    FINDINGS:  Sagittal images demonstrate straightening of lumbar lordosis with minimal retrolisthesis L5 on S1.  Alignment otherwise anatomic.    Disc degenerative change including desiccation and/or decreased height T12-L1 and L2-3 through L5-S1 discs.  Small multilevel anterior osteophytes noted.    L2-3, bulging of the disc is present with superimposed left paracentral protrusion resulting in moderate flattening of the adjacent ventral thecal sac and overall mild degree of canal stenosis.  Mild bilateral foraminal narrowing is also present.    L3-4, bulging of the disc is present with associated spondylosis and results in moderate effacement of the thecal sac, moderate left and mild right foraminal narrowing and overall mild to moderate degree of canal stenosis.    L4-5, mild bulging of the disc and associated  spondylosis are present with mild impression on ventral thecal sac and moderate bilateral foraminal narrowing.    L5-S1, bulging of the disc and associated spondylosis are present with secondary fairly severe bilateral foraminal narrowing.    No disc herniation, canal compromise or foraminal compromise appreciated remaining lumbar or visualized lower thoracic disc levels.  The conus medullaris has a normal contour and signal.  Marrow degenerative change adjacent to the L5-S1 disc.   Impression       Straightening of lumbar lordosis.    L2-3, bulging of the disc with superimposed left paracentral protrusion combination of findings resulting in moderate effacement ventral subarachnoid space and mild canal stenosis along with mild foraminal narrowing..    L3-4, bulging of the disc and associated spondylosis with moderate effacement ventral thecal sac and resulting in mild to moderate foraminal narrowing and overall mild to moderate degree of canal stenosis.    L4-5, mild bulging of the disc and associated spondylosis with moderate bilateral foraminal narrowing.    L5-S1, bulging of the disc and associated spondylosis with severe foraminal narrowing.      Electronically signed by: Kathy Mitchell MD  Date: 09/12/2019  Time: 11:02         Assessment:       Encounter Diagnoses   Name Primary?   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    Diagnoses and all orders for this visit:    DDD (degenerative disc disease), cervical  -     methocarbamol (ROBAXIN) 500 MG Tab; Take 1 tablet (500 mg total) by mouth 4 (four) times daily as needed.  -     MRI Cervical Spine Without Contrast; Future    DDD (degenerative disc disease), lumbar  -      methocarbamol (ROBAXIN) 500 MG Tab; Take 1 tablet (500 mg total) by mouth 4 (four) times daily as needed.    Lumbar spondylosis  -     methocarbamol (ROBAXIN) 500 MG Tab; Take 1 tablet (500 mg total) by mouth 4 (four) times daily as needed.    Cervical spondylosis  -     methocarbamol (ROBAXIN) 500 MG Tab; Take 1 tablet (500 mg total) by mouth 4 (four) times daily as needed.  -     MRI Cervical Spine Without Contrast; Future    Lumbar radiculopathy  -     methocarbamol (ROBAXIN) 500 MG Tab; Take 1 tablet (500 mg total) by mouth 4 (four) times daily as needed.    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 is a 49 y.o. male with chronic bilateral low back pain that radiates to the bilateral lower extremities. Has indications of both lumbar facet pain and possibly pain from spinal stenosis or radiculopathy.  Lumbar MRI with multilevel disc degeneration and multilevel foraminal stenosis with multilevel spinal stenosis as well. No relief from caudal epidural steroid injection. Now with more acute neck pain/stiffness.  No overt signs of radiculopathy or myelopathy.    1.  Cervical MRI to evaluate for any new or developing compressive pathology.  2.  Start Robaxin 500 mg 4 times a day as needed for pain.  3.  Return to clinic after MRI to discuss results.

## 2019-11-25 NOTE — LETTER
November 25, 2019      Katie Rhodes MD  [address]  [place] [address]           [facility]  [address]  [place] [address]  Phone: [phone]  Fax: [phone]          Patient: Anmol Angeles Jr.   MR Number: [id number]   YOB: 1970   Date of Visit: 11/25/2019       Dear Dr. Hough:    Thank you for referring Anmol Angeles to me for evaluation. Attached you will find relevant portions of my assessment and plan of care.    If you have questions, please do not hesitate to call me. I look forward to following Anmol Angeels along with you.    Sincerely,    Amol Grady Jr., MD    Enclosure  CC:  No Recipients    If you would like to receive this communication electronically, please contact [email or web address] or [phone] to request more information on [facility] access.    For providers and/or their staff who would like to refer a patient to [facility], please contact us through our one-stop-shop provider referral line, [facility], at [phone].    If you feel you have received this communication in error or would no longer like to receive these types of communications, please e-mail [email or web address]

## 2019-12-02 ENCOUNTER — TELEPHONE (OUTPATIENT)
Dept: PAIN MEDICINE | Facility: CLINIC | Age: 49
End: 2019-12-02

## 2019-12-02 DIAGNOSIS — G95.9 CERVICAL MYELOPATHY: ICD-10-CM

## 2019-12-02 DIAGNOSIS — M50.30 DDD (DEGENERATIVE DISC DISEASE), CERVICAL: Primary | ICD-10-CM

## 2019-12-02 DIAGNOSIS — M47.812 CERVICAL SPONDYLOSIS: ICD-10-CM

## 2019-12-02 DIAGNOSIS — M48.02 CERVICAL SPINAL STENOSIS: ICD-10-CM

## 2019-12-02 NOTE — TELEPHONE ENCOUNTER
----- Message from Amol Grady Jr., MD sent at 12/2/2019  2:52 PM CST -----  Please call patient to let him know that I have reviewed his MRI.  It showed some narrowing in his neck and some swelling of the spinal cord.  I have placed a referral to Neurosurgery to have this issue further evaluated.  Please help him schedule this appointment.  Thank you.

## 2019-12-02 NOTE — TELEPHONE ENCOUNTER
Information provided to Mr. Corea, as well as scheduled an appointment with Neurosurgery.  Letter will be mailed.

## 2019-12-07 ENCOUNTER — PATIENT OUTREACH (OUTPATIENT)
Dept: ADMINISTRATIVE | Facility: OTHER | Age: 49
End: 2019-12-07

## 2019-12-12 ENCOUNTER — OFFICE VISIT (OUTPATIENT)
Dept: PAIN MEDICINE | Facility: CLINIC | Age: 49
End: 2019-12-12
Payer: MEDICARE

## 2019-12-12 VITALS
HEART RATE: 76 BPM | BODY MASS INDEX: 25.17 KG/M2 | SYSTOLIC BLOOD PRESSURE: 137 MMHG | WEIGHT: 196.13 LBS | HEIGHT: 74 IN | DIASTOLIC BLOOD PRESSURE: 87 MMHG

## 2019-12-12 DIAGNOSIS — M51.36 DDD (DEGENERATIVE DISC DISEASE), LUMBAR: ICD-10-CM

## 2019-12-12 DIAGNOSIS — M47.812 CERVICAL SPONDYLOSIS: ICD-10-CM

## 2019-12-12 DIAGNOSIS — M48.02 CERVICAL SPINAL STENOSIS: ICD-10-CM

## 2019-12-12 DIAGNOSIS — M54.16 LUMBAR RADICULOPATHY: ICD-10-CM

## 2019-12-12 DIAGNOSIS — M50.30 DDD (DEGENERATIVE DISC DISEASE), CERVICAL: Primary | ICD-10-CM

## 2019-12-12 DIAGNOSIS — M47.816 LUMBAR SPONDYLOSIS: ICD-10-CM

## 2019-12-12 DIAGNOSIS — G95.9 CERVICAL MYELOPATHY: ICD-10-CM

## 2019-12-12 PROCEDURE — 99213 OFFICE O/P EST LOW 20 MIN: CPT | Mod: S$GLB,,, | Performed by: PAIN MEDICINE

## 2019-12-12 PROCEDURE — 99999 PR PBB SHADOW E&M-EST. PATIENT-LVL III: CPT | Mod: PBBFAC,,, | Performed by: PAIN MEDICINE

## 2019-12-12 PROCEDURE — 99999 PR PBB SHADOW E&M-EST. PATIENT-LVL III: ICD-10-PCS | Mod: PBBFAC,,, | Performed by: PAIN MEDICINE

## 2019-12-12 PROCEDURE — 99213 PR OFFICE/OUTPT VISIT, EST, LEVL III, 20-29 MIN: ICD-10-PCS | Mod: S$GLB,,, | Performed by: PAIN MEDICINE

## 2019-12-12 PROCEDURE — 3008F PR BODY MASS INDEX (BMI) DOCUMENTED: ICD-10-PCS | Mod: CPTII,S$GLB,, | Performed by: PAIN MEDICINE

## 2019-12-12 PROCEDURE — 3008F BODY MASS INDEX DOCD: CPT | Mod: CPTII,S$GLB,, | Performed by: PAIN MEDICINE

## 2019-12-12 RX ORDER — IBUPROFEN 800 MG/1
800 TABLET ORAL 3 TIMES DAILY PRN
Qty: 30 TABLET | Refills: 0 | Status: SHIPPED | OUTPATIENT
Start: 2019-12-12 | End: 2020-03-09 | Stop reason: SDUPTHER

## 2019-12-12 NOTE — PROGRESS NOTES
Subjective:     Patient ID: Anmol Angeles Jr. is a 49 y.o. male    Chief Complaint: Neck Pain (MRI f/u)      Referred by: No ref. provider found      HPI:    Interval History (12/12/19):  He returns today for follow up and MRI review.  He reports that his symptoms have been unchanged since last encounter.  He denies any new or worsening symptoms.  He has an appointment with neurosurgery on 12/16/19.      Interval History (11/25/19):  He returns today for follow up.  He reports that caudal epidural steroid injection has not been helpful for the low back pain.  He denies any changes in the quality or location was low back pain.  He attended physical therapy for his low back during of his sessions began to have some mild neck pain. This pain developed into severe neck pain and stiffness with associated vertigo.  Patient was evaluated emergency room.  Patient states pain is interfering with his sleep.  He denies any numbness, tingling, weakness, bowel bladder dysfunction.  He states that he has taken Robaxin and this medicine help with his pain and help him to relax of aching sleep.      Initial Encounter (10/1/19):  Anmol Angeles Jr. is a 49 y.o. male who presents today with chronic bilateral low back pain that radiates the right lower extremity.  This pain has been present for many years.  No specific inciting event or injury noted.  Patient is status post low back surgery before 2005 for this issue.  He states that this surgery mostly help with his pain except for an occasional flare up that would last for about 3-4 days.  His current episode of pain has lasted for 2-3 months and involves left-sided low back and lower extremity.  He also reports some tightness in the leg.  Pain is constant worse with activity.  Pain is also worse with initiating activity after rest.  He reports diffuse numbness and tingling throughout his body.  He denies any weakness.  He denies any bowel or bladder  dysfunction.    Physical Therapy:  Yes.    Non-pharmacologic Treatment:  Rest helps         · TENS?  No    Pain Medications:         · Currently taking:  ibuprofen    · Has tried in the past:  Medrol Dosepak, Flexeril, Robaxin    · Has not tried:  Opioids, Tylenol, TCAs, SNRIs, anticonvulsants, topical creams    Blood thinners:  None    Interventional Therapies:    10/10/19 - caudal epidural steroid injection - no relief noted    Relevant Surgeries:  Previous low back surgery before 2005    Affecting sleep?  Yes    Affecting daily activities? yes    Depressive symptoms? yes          · SI/HI? No    Work status: Employed    Pain Scores:    Best:    1/10  Worst:   10/10  Usually:  4/10  Today:  6/10    Review of Systems   Constitutional: Negative for activity change, appetite change, chills, fatigue, fever and unexpected weight change.   HENT: Negative for hearing loss.    Eyes: Negative for visual disturbance.   Respiratory: Negative for chest tightness and shortness of breath.    Cardiovascular: Negative for chest pain.   Gastrointestinal: Negative for abdominal pain, constipation, diarrhea, nausea and vomiting.   Genitourinary: Negative for difficulty urinating.   Musculoskeletal: Positive for arthralgias, back pain, gait problem, myalgias, neck pain and neck stiffness.   Skin: Negative for rash.   Neurological: Positive for numbness. Negative for dizziness, weakness, light-headedness and headaches.   Psychiatric/Behavioral: Positive for sleep disturbance. Negative for hallucinations and suicidal ideas. The patient is not nervous/anxious.        Past Medical History:   Diagnosis Date    Anxiety     Back pain     Depressive disorder, not elsewhere classified 11/25/2014    Perirectal fistula     Seizures        Past Surgical History:   Procedure Laterality Date    BACK SURGERY      CAUDAL EPIDURAL STEROID INJECTION N/A 10/10/2019    Procedure: Injection-steroid-epidural-caudal;  Surgeon: Amol Grady  MD Dawit;  Location: [facility] OR;  Service: Pain Management;  Laterality: N/A;    COLONOSCOPY N/A 8/22/2016    Procedure: COLONOSCOPY;  Surgeon: TROY Inman MD;  Location: [facility] ENDO (4TH FLR);  Service: Endoscopy;  Laterality: N/A;    EXAMINATION UNDER ANESTHESIA N/A 10/12/2018    Procedure: Exam under anesthesia;  Surgeon: RTOY Inman MD;  Location: [facility] OR 2ND FLR;  Service: Colon and Rectal;  Laterality: N/A;    fistula surgery      HAND SURGERY Left     INSERTION OF SETON STITCH N/A 10/12/2018    Procedure: PLACEMENT, SETON STITCH;  Surgeon: TROY Inman MD;  Location: [facility] OR 2ND FLR;  Service: Colon and Rectal;  Laterality: N/A;    LEG SURGERY      plate    LEG SURGERY Right        Social History     Socioeconomic History    Marital status: Single     Spouse name: Not on file    Number of children: Not on file    Years of education: Not on file    Highest education level: Not on file   Occupational History    Occupation: Maintenance with [employer]     Employer: [employer]    Social Needs    Financial resource strain: Not on file    Food insecurity:     Worry: Not on file     Inability: Not on file    Transportation needs:     Medical: Not on file     Non-medical: Not on file   Tobacco Use    Smoking status: Current Every Day Smoker     Packs/day: 1.50     Years: 30.00     Pack years: 45.00     Types: Cigarettes    Smokeless tobacco: Never Used   Substance and Sexual Activity    Alcohol use: Yes     Comment: beer occasionally     Drug use: Yes     Types: Marijuana    Sexual activity: Yes   Lifestyle    Physical activity:     Days per week: Not on file     Minutes per session: Not on file    Stress: Not on file   Relationships    Social connections:     Talks on phone: Not on file     Gets together: Not on file     Attends [employer] service: Not on file     Active member of club or organization: Not on file     Attends meetings of clubs or organizations: Not on file      "Relationship status: Not on file   Other Topics Concern    Not on file   Social History Narrative    Not on file       Review of patient's allergies indicates:   Allergen Reactions    Latex, natural rubber        Current Outpatient Medications on File Prior to Visit   Medication Sig Dispense Refill    meclizine (ANTIVERT) 12.5 mg tablet Take 1 tablet (12.5 mg total) by mouth 3 (three) times daily as needed for Dizziness. 20 tablet 0    methocarbamol (ROBAXIN) 500 MG Tab Take 1 tablet (500 mg total) by mouth 4 (four) times daily as needed. 120 tablet 2    triamcinolone acetonide 0.1% (KENALOG) 0.1 % cream Apply topically 2 (two) times daily. 80 g 1    [DISCONTINUED] ibuprofen (ADVIL,MOTRIN) 800 MG tablet Take 1 tablet (800 mg total) by mouth 3 (three) times daily as needed for Pain. 30 tablet 0     No current facility-administered medications on file prior to visit.        Objective:      /87 (BP Location: Left arm, Patient Position: Sitting, BP Method: Medium (Automatic))   Pulse 76   Ht 6' 2" (1.88 m)   Wt 89 kg (196 lb 1.6 oz)   BMI 25.18 kg/m²     Exam:  GEN:  Well developed, well nourished.  No acute distress.  Normal pain behavior.  HEENT:  No trauma.  Mucous membranes moist.  Nares patent bilaterally.  PSYCH: Normal affect. Thought content appropriate.  CHEST:  Breathing symmetric.  No audible wheezing.  ABD: Soft, non-distended.  SKIN:  Warm, pink, dry.  No rash on exposed areas.    EXT:  No cyanosis, clubbing, or edema.  No color change or changes in nail or hair growth.  NEURO/MUSCULOSKELETAL:  Fully alert, oriented, and appropriate. Speech normal duc. No cranial nerve deficits.   Gait:   normal.  5/5 motor strength throughout upper extremities.   Sensory:   no  sensory deficit in the upper extremities.   Reflexes:   2 + and symmetric throughout.   absent  Chris's bilaterally.  C-Spine:   very limited  ROM with pain on  all movements.  unable to assess  facet loading bilaterally.   " unable to assess  Spurling's bilaterally.              Imaging:      Narrative     EXAMINATION:  MRI CERVICAL SPINE WITHOUT CONTRAST    CLINICAL HISTORY:  cervical spinal stenosis;.  Other cervical disc degeneration, unspecified cervical region    TECHNIQUE:  Multiplanar, multisequence MR images of the cervical spine were acquired without the administration of contrast.    COMPARISON:  None.    FINDINGS:  Sagittal images demonstrate the vertebrae to be anatomically aligned.  There are normal in height and signal intensity without an acute marrow placement process or bone marrow edema.  There is no compression deformity.  There is desiccation and disc space narrowing to a moderate degree at C5-6 and to a lesser degree C6-7.  Cervical cord is remarkable for edematous changes at the C3-4 disc level and to a less significant degree at the C5-6 level.  There is no mass lesion or cord expansion.  The paraspinal soft tissues are unremarkable.    C2-3.  Axial images show eccentric disc bulging and osteophyte complex to the right side along with facet and uncovertebral joint disease causing moderate right-sided foraminal narrowing.  There is no spinal stenosis.    C3-4.  Broad-based left paracentral protrusion of the disc impresses on the cervical cord facet and uncovertebral joint disease and to the moderate left-sided canal and foraminal narrowing.  Cord edema is evident without mass.    C4-5.  Asymmetric left-sided canal and foraminal narrowing is appreciated secondary to protrusion of the disc and uncovertebral joint disease.  There is also noted mild/moderate right-sided foraminal narrowing secondary to facet arthropathy.  There is no cord edema.    C5-6.  Bilateral foraminal narrowing is present.  This is moderate to severe on the right side secondary to facet and uncovertebral joint disease.  It is mild to moderate on the left side.  Spinal stenosis is minimal.    C6-7.  Mild spinal stenosis is present along with  bilateral foraminal narrowing due to disc bulging and hypertrophic facet disease.    C7-T1.  No spinal stenosis or foraminal narrowing of significance.   Impression       1. Mild cord edema at the C3-4 level.  This is likely the result of recent trauma and moderate spinal stenosis.  There is a left paracentral disc protrusion along with facet arthropathy.  2. Moderate asymmetric left-sided spinal canal and foraminal narrowing at C4-5 secondary to disc protrusion and facet arthropathy.  3. Bilateral foraminal narrowing at C5-6 more significant on the right side.  4. Mild spinal stenosis and bilateral foraminal narrowing at C6-7.      Electronically signed by: Brijesh Car  Date: 11/29/2019  Time: 08:51    Encounter     View Encounter                             Narrative     EXAMINATION:  MRI LUMBAR SPINE WITHOUT CONTRAST    CLINICAL HISTORY:  evaluate for lumbar annular tear, history of lumbar surgery;.  Other specified postprocedural states    TECHNIQUE:  Sagittal and axial T1 and T2 W images    COMPARISON:  Correlation is made to lumbar spine series 02/09/2015.    FINDINGS:  Sagittal images demonstrate straightening of lumbar lordosis with minimal retrolisthesis L5 on S1.  Alignment otherwise anatomic.    Disc degenerative change including desiccation and/or decreased height T12-L1 and L2-3 through L5-S1 discs.  Small multilevel anterior osteophytes noted.    L2-3, bulging of the disc is present with superimposed left paracentral protrusion resulting in moderate flattening of the adjacent ventral thecal sac and overall mild degree of canal stenosis.  Mild bilateral foraminal narrowing is also present.    L3-4, bulging of the disc is present with associated spondylosis and results in moderate effacement of the thecal sac, moderate left and mild right foraminal narrowing and overall mild to moderate degree of canal stenosis.    L4-5, mild bulging of the disc and associated spondylosis are present with mild  impression on ventral thecal sac and moderate bilateral foraminal narrowing.    L5-S1, bulging of the disc and associated spondylosis are present with secondary fairly severe bilateral foraminal narrowing.    No disc herniation, canal compromise or foraminal compromise appreciated remaining lumbar or visualized lower thoracic disc levels.  The conus medullaris has a normal contour and signal.  Marrow degenerative change adjacent to the L5-S1 disc.   Impression       Straightening of lumbar lordosis.    L2-3, bulging of the disc with superimposed left paracentral protrusion combination of findings resulting in moderate effacement ventral subarachnoid space and mild canal stenosis along with mild foraminal narrowing..    L3-4, bulging of the disc and associated spondylosis with moderate effacement ventral thecal sac and resulting in mild to moderate foraminal narrowing and overall mild to moderate degree of canal stenosis.    L4-5, mild bulging of the disc and associated spondylosis with moderate bilateral foraminal narrowing.    L5-S1, bulging of the disc and associated spondylosis with severe foraminal narrowing.      Electronically signed by: Kathy Mitchell MD  Date: 09/12/2019  Time: 11:02         Assessment:       Encounter Diagnoses   Name Primary?    Lumbar spondylosis     Lumbar radiculopathy     DDD (degenerative disc disease), lumbar     DDD (degenerative disc disease), cervical Yes    Cervical spondylosis     Cervical spinal stenosis     Cervical myelopathy          Plan:       Anmol was seen today for neck pain.    Diagnoses and all orders for this visit:    DDD (degenerative disc disease), cervical    Lumbar spondylosis  -     ibuprofen (ADVIL,MOTRIN) 800 MG tablet; Take 1 tablet (800 mg total) by mouth 3 (three) times daily as needed for Pain.    Lumbar radiculopathy  -     ibuprofen (ADVIL,MOTRIN) 800 MG tablet; Take 1 tablet (800 mg total) by mouth 3 (three) times daily as needed for Pain.    DDD  (degenerative disc disease), lumbar  -     ibuprofen (ADVIL,MOTRIN) 800 MG tablet; Take 1 tablet (800 mg total) by mouth 3 (three) times daily as needed for Pain.    Cervical spondylosis    Cervical spinal stenosis    Cervical myelopathy        Anmol Angeles Jr. is a 49 y.o. male with chronic bilateral low back pain that radiates to the bilateral lower extremities. Has indications of both lumbar facet pain and possibly pain from spinal stenosis or radiculopathy.  Lumbar MRI with multilevel disc degeneration and multilevel foraminal stenosis with multilevel spinal stenosis as well. No relief from caudal epidural steroid injection. Now with more acute neck pain/stiffness.  No overt signs of radiculopathy or myelopathy.  Significant spinal stenosis noted on cervical MRI with possible myelomalacia.    1.  Pertinent imaging studies reviewed by me. Imaging results were discussed with patient.  2.  Patient is seen Neurosurgery next week.  I sent a message to the Neurosurgery team alerted him to the MRI findings.  3.  No further interventions at this time given severe spinal stenosis of the cervical region.  4.  Return to clinic as needed.

## 2019-12-14 ENCOUNTER — PATIENT OUTREACH (OUTPATIENT)
Dept: ADMINISTRATIVE | Facility: OTHER | Age: 49
End: 2019-12-14

## 2019-12-16 ENCOUNTER — OFFICE VISIT (OUTPATIENT)
Dept: NEUROSURGERY | Facility: CLINIC | Age: 49
End: 2019-12-16
Payer: MEDICARE

## 2019-12-16 VITALS
BODY MASS INDEX: 25.32 KG/M2 | DIASTOLIC BLOOD PRESSURE: 81 MMHG | WEIGHT: 197.31 LBS | HEART RATE: 70 BPM | OXYGEN SATURATION: 99 % | HEIGHT: 74 IN | SYSTOLIC BLOOD PRESSURE: 146 MMHG

## 2019-12-16 DIAGNOSIS — M96.1 POSTLAMINECTOMY SYNDROME OF LUMBAR REGION: ICD-10-CM

## 2019-12-16 DIAGNOSIS — M48.02 CERVICAL SPINAL STENOSIS: Primary | ICD-10-CM

## 2019-12-16 PROCEDURE — 99999 PR PBB SHADOW E&M-EST. PATIENT-LVL III: ICD-10-PCS | Mod: PBBFAC,,, | Performed by: PHYSICIAN ASSISTANT

## 2019-12-16 PROCEDURE — 3008F BODY MASS INDEX DOCD: CPT | Mod: CPTII,S$GLB,, | Performed by: PHYSICIAN ASSISTANT

## 2019-12-16 PROCEDURE — 99214 PR OFFICE/OUTPT VISIT, EST, LEVL IV, 30-39 MIN: ICD-10-PCS | Mod: S$GLB,,, | Performed by: PHYSICIAN ASSISTANT

## 2019-12-16 PROCEDURE — 3008F PR BODY MASS INDEX (BMI) DOCUMENTED: ICD-10-PCS | Mod: CPTII,S$GLB,, | Performed by: PHYSICIAN ASSISTANT

## 2019-12-16 PROCEDURE — 99999 PR PBB SHADOW E&M-EST. PATIENT-LVL III: CPT | Mod: PBBFAC,,, | Performed by: PHYSICIAN ASSISTANT

## 2019-12-16 PROCEDURE — 99214 OFFICE O/P EST MOD 30 MIN: CPT | Mod: S$GLB,,, | Performed by: PHYSICIAN ASSISTANT

## 2019-12-16 NOTE — LETTER
December 19, 2019      Amol Grady Jr., MD  [address]  [address]  [facility] [address]           [facility]  [address]  [facility] [address]  Phone: [phone]  Fax: [phone]          Patient: Anmol Angeles Jr.   MR Number: [id number]   YOB: 1970   Date of Visit: 12/16/2019       Dear Dr. Amol Grady Jr.:    Thank you for referring Anmol Angeles to me for evaluation. Attached you will find relevant portions of my assessment and plan of care.    If you have questions, please do not hesitate to call me. I look forward to following Anmol Angeles along with you.    Sincerely,    Nany Garces PA-C    Enclosure  CC:  No Recipients    If you would like to receive this communication electronically, please contact [email or web address] or [phone] to request more information on [employer] access.    For providers and/or their staff who would like to refer a patient to [facility], please contact us through our one-stop-shop provider referral line, [facility], [place], at [phone].    If you feel you have received this communication in error or would no longer like to receive these types of communications, please e-mail [email or web address]

## 2019-12-20 NOTE — PROGRESS NOTES
[facility]  Neurosurgery    SUBJECTIVE:     History of Present Illness:  Anmol Angeles Jr. is a 49 y.o. male with complex medical history as listed below who presents with neck and low back pain for evaluation of severe cervical stenosis as a referral from Dr. Grady. He was being treated by Dr. Grady for chronic low back pain and BLE pain, for which he has failed multiple STEPHENIE. He began to complain of increasing neck pain, so cervcial MRI was ordered and revealed severe central stenosis.     Today, patient endorses 7/10 neck and 8/10 back pain. Neck pain began on approximately 11/13/19 when he began physical therapy. He denies numbness, weakness, dropping items from his hands, b/b dysfunction, SA, and falls. He does, however,  report recently dropping his cigarettes without explanation. Endorses vertigo when leaning his head back.     Patient is a somewhat poor historian when attempting to determine history of neck symptoms. He reports that two years ago he was working on a truck that fell on his head/shoulders leaving him paralyzed for 3 minutes. He did not seek medical attention because the sxs wore off within 5 minutes. He also reports a concussion 5 years ago that left him disabled.           (Not in a hospital admission)    Review of patient's allergies indicates:   Allergen Reactions    Latex, natural rubber        Past Medical History:   Diagnosis Date    Anxiety     Back pain     Depressive disorder, not elsewhere classified 11/25/2014    Perirectal fistula     Seizures      Past Surgical History:   Procedure Laterality Date    BACK SURGERY      CAUDAL EPIDURAL STEROID INJECTION N/A 10/10/2019    Procedure: Injection-steroid-epidural-caudal;  Surgeon: Amol Grady Jr., MD;  Location: [facility] OR;  Service: Pain Management;  Laterality: N/A;    COLONOSCOPY N/A 8/22/2016    Procedure: COLONOSCOPY;  Surgeon: TROY Inman MD;  Location: [facility] ([address] [place] [address]);  Service:  Endoscopy;  Laterality: N/A;    EXAMINATION UNDER ANESTHESIA N/A 10/12/2018    Procedure: Exam under anesthesia;  Surgeon: TROY Inman MD;  Location: [facility] OR 2ND FLR;  Service: Colon and Rectal;  Laterality: N/A;    fistula surgery      HAND SURGERY Left     INSERTION OF SETON STITCH N/A 10/12/2018    Procedure: PLACEMENT, SETON STITCH;  Surgeon: TROY Inman MD;  Location: [facility] OR 2ND FLR;  Service: Colon and Rectal;  Laterality: N/A;    LEG SURGERY      plate    LEG SURGERY Right      No family history on file.  Social History     Tobacco Use    Smoking status: Current Every Day Smoker     Packs/day: 1.50     Years: 30.00     Pack years: 45.00     Types: Cigarettes    Smokeless tobacco: Never Used   Substance Use Topics    Alcohol use: Yes     Comment: beer occasionally     Drug use: Yes     Types: Marijuana        Review of Systems:  As noted in HPI     OBJECTIVE:     Vital Signs (Most Recent):  Pulse: 70 (12/16/19 1415)  BP: (!) 146/81 (12/16/19 1415)  SpO2: 99 % (12/16/19 1415)    Physical Exam:  General: well developed, well nourished, no distress  Head: normocephalic, atraumatic  Neurologic: Alert and oriented. Thought content appropriate  GCS: Motor: 6/Verbal: 5/Eyes: 4 GCS Total: 15  Language: No aphasia  Speech: No dysarthria  Cranial nerves: face symmetric, tongue midline, CN II-XII grossly intact.   Eyes: pupils equal, round, reactive to light with accommodation, EOMI.   Pulmonary: normal respirations, not labored, no accessory muscles used  Sensory: intact to light touch throughout  Motor Strength: Moves all extremities spontaneously with good tone.  Full strength upper and lower extremities. No abnormal movements seen.     Strength  Deltoids Triceps Biceps Wrist Extension Wrist Flexion Hand    Upper: R 5/5 5/5 5/5 5/5 5/5 5/5    L 5/5 5/5 5/5 5/5 5/5 5/5     Iliopsoas Quadriceps Knee  Flexion Tibialis  anterior Gastro- cnemius EHL   Lower: R 5/5 5/5 5/5 5/5 5/5 5/5    L 5/5 5/5  5/5 5/5 5/5 5/5     DTR's - 2 + and symmetric triceps, biceps, brachioradialis, patellar, & achilles  Chris: bilateral   Clonus: absent  Skin: warm, dry and intact, no rashes  Gait: normal      Diagnostic Results:  I have personally reviewed imaging and agree with the findings.     Cervical MRI 11/29/19  -Mild cord edema at C3-4 with left paracentral disc protrusion and facet arthropathy  Moderate asymmetric left-sided spinal canal and foraminal narrowing at C4-5 secondary to disc protrusion and facet arthropathy  -Bilateral foraminal narrowing at C5-6 more significant on the right side.  -mild spinal stenosis and bilateral foraminal narrowing at C6-7    ASSESSMENT/PLAN:     Anmol Angeles Jr. is a 49 y.o. male who presents for evaluation of severe cervical stenosis in the setting of worsening neck pain and chronic low back pain. It is difficult to assess completely which symptoms are new and which are chronic. He denies recent neck injury but reports head/neck injury two years ago that left him paralyzed for 3 minutes. On exam he has full strength and intact sensation. He does have bilateral hoffmans on exam. Will have patient return to see Dr. Amin to determine if surgical intervention is warranted.       Please feel free to call with any further questions    Disclaimer: This note was dictated by speech recognition. Minor errors in transcription may be present.  Please call with any questions.      Nany Garces PA-C  [facility]  Department of Neurosurgery  [phone]

## 2019-12-22 ENCOUNTER — PATIENT OUTREACH (OUTPATIENT)
Dept: ADMINISTRATIVE | Facility: OTHER | Age: 49
End: 2019-12-22

## 2019-12-26 ENCOUNTER — OFFICE VISIT (OUTPATIENT)
Dept: SPINE | Facility: CLINIC | Age: 49
End: 2019-12-26
Payer: MEDICARE

## 2019-12-26 VITALS
BODY MASS INDEX: 25.32 KG/M2 | HEIGHT: 74 IN | SYSTOLIC BLOOD PRESSURE: 126 MMHG | WEIGHT: 197.31 LBS | HEART RATE: 86 BPM | DIASTOLIC BLOOD PRESSURE: 80 MMHG

## 2019-12-26 DIAGNOSIS — M51.26 HNP (HERNIATED NUCLEUS PULPOSUS), LUMBAR: ICD-10-CM

## 2019-12-26 DIAGNOSIS — M47.816 SPONDYLOSIS OF LUMBAR REGION WITHOUT MYELOPATHY OR RADICULOPATHY: ICD-10-CM

## 2019-12-26 DIAGNOSIS — M51.36 DDD (DEGENERATIVE DISC DISEASE), LUMBAR: ICD-10-CM

## 2019-12-26 DIAGNOSIS — M54.50 CHRONIC BILATERAL LOW BACK PAIN WITHOUT SCIATICA: Primary | ICD-10-CM

## 2019-12-26 DIAGNOSIS — G89.29 CHRONIC BILATERAL LOW BACK PAIN WITHOUT SCIATICA: Primary | ICD-10-CM

## 2019-12-26 PROCEDURE — 99214 PR OFFICE/OUTPT VISIT, EST, LEVL IV, 30-39 MIN: ICD-10-PCS | Mod: S$GLB,,, | Performed by: PHYSICIAN ASSISTANT

## 2019-12-26 PROCEDURE — 99214 OFFICE O/P EST MOD 30 MIN: CPT | Mod: S$GLB,,, | Performed by: PHYSICIAN ASSISTANT

## 2019-12-26 PROCEDURE — 3008F BODY MASS INDEX DOCD: CPT | Mod: CPTII,S$GLB,, | Performed by: PHYSICIAN ASSISTANT

## 2019-12-26 PROCEDURE — 99999 PR PBB SHADOW E&M-EST. PATIENT-LVL III: CPT | Mod: PBBFAC,,, | Performed by: PHYSICIAN ASSISTANT

## 2019-12-26 PROCEDURE — 3008F PR BODY MASS INDEX (BMI) DOCUMENTED: ICD-10-PCS | Mod: CPTII,S$GLB,, | Performed by: PHYSICIAN ASSISTANT

## 2019-12-26 PROCEDURE — 99999 PR PBB SHADOW E&M-EST. PATIENT-LVL III: ICD-10-PCS | Mod: PBBFAC,,, | Performed by: PHYSICIAN ASSISTANT

## 2019-12-26 RX ORDER — GABAPENTIN 300 MG/1
CAPSULE ORAL
Qty: 90 CAPSULE | Refills: 2 | Status: SHIPPED | OUTPATIENT
Start: 2019-12-26 | End: 2020-05-26

## 2019-12-26 NOTE — PROGRESS NOTES
"Subjective:     Patient ID:  Anmol Angeles Jr. is a 49 y.o. male.    [place]    Chief Complaint: Low back pain, Neck pain    HPI    Anmol Angeles Jr. is a 49 y.o. male who presents for follow up.  The patient is a new patient to me today and is a patient of Magda Otoole PA-C [facility].    Had the caudal STEPHENIE that did not help at all.  He went to PT for his low back and then started to have neck pain.  He continues to have low back stiffness more on the left side.  No leg pain.    Robaxin does not help.  Ibuprofen helps some.  He took gabapentin 100mg but does not remember how often he took it for a few days and then stopped as he didn't feel like it was working.    He has neck pain that radiates to the right shoulder and recently saw Nany Garces PA-C and was referred to Dr. Amin for surgical eval. No arm or hand pain or paresthesias.    He has also seen Dr. Grady recently.    Patient denies any recent accidents or trauma, no saddle anesthesias, and no bowel or bladder incontinence.      Review of Systems:  Constitution: Negative for chills, fever, night sweats and weight loss.   Musculoskeletal: Negative for falls.   Gastrointestinal: Negative for bowel incontinence, nausea and vomiting.   Genitourinary: Negative for bladder incontinence.   Neurological: Negative for disturbances in coordination and loss of balance.      Objective:      Vitals:    12/26/19 0957   BP: 126/80   Pulse: 86   Weight: 89.5 kg (197 lb 5 oz)   Height: 6' 2" (1.88 m)   PainSc:   7   PainLoc: Neck         Physical Exam:    General:  Anmol Angeles Jr. is well-developed, well-nourished, appears stated age, in no acute distress, alert and oriented to person, place, and time.    Patient sits comfortably in the exam room and answers questions appropriately. Grossly patient is able to move bilateral lower extremities without difficulty.     MRI Interpretation:     MRI of lumbar spine dated 9/12/19 is " personally reviewed and shows facet hypertrophy L2-L3 with left paracentral disc, mild central/bilateral foraminal stenosis. Disc bulge/facet hypertrophy L3-L4 with moderate central stenosis and moderate left/mild right foraminal stenosis. Disc bulge/facet hypertrophy L4-l5 with moderate bilateral foraminal stenosis. Retrolisthesis L5-S1 with disc bulge/facet hypertrophy and severe bilateral foraminal stenosis.     Assessment:          1. Chronic bilateral low back pain without sciatica    2. DDD (degenerative disc disease), lumbar    3. 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 HNP (herniated nucleus pulposus), lumbar            Plan:          Orders Placed This Encounter    gabapentin (NEURONTIN) 300 MG capsule       Left L2-3 HNP and mild bilateral NFS.  Lumbar spondylosis.    -Medrol pack that he has from last time he did not take.  Take with food  -Gabapentin now  -Resume ibuprofen PRN with food once the medrol pack is done  -Consider left L2 and left L3 TESI in the future  -FU with Dr. Grady for further recommendations on spine injections    Cervical HNP/spinal stenosis    -Recently saw Nany Garces PA-C in [place] for this and he is scheduled to see Dr. Amin for this      Follow-Up:  Follow up if symptoms worsen or fail to improve. If there are any questions prior to this, the patient was instructed to contact the office.       BRANDO Everett PA-C  Neurosurgery  [facility]  [facility]

## 2020-03-09 ENCOUNTER — OFFICE VISIT (OUTPATIENT)
Dept: NEUROSURGERY | Facility: CLINIC | Age: 50
End: 2020-03-09
Payer: MEDICARE

## 2020-03-09 VITALS
BODY MASS INDEX: 25.49 KG/M2 | WEIGHT: 198.63 LBS | HEIGHT: 74 IN | DIASTOLIC BLOOD PRESSURE: 91 MMHG | RESPIRATION RATE: 16 BRPM | HEART RATE: 78 BPM | SYSTOLIC BLOOD PRESSURE: 142 MMHG

## 2020-03-09 DIAGNOSIS — M50.30 DDD (DEGENERATIVE DISC DISEASE), CERVICAL: Primary | ICD-10-CM

## 2020-03-09 DIAGNOSIS — M48.02 CERVICAL SPINAL STENOSIS: ICD-10-CM

## 2020-03-09 DIAGNOSIS — M54.16 LUMBAR RADICULOPATHY: ICD-10-CM

## 2020-03-09 DIAGNOSIS — M47.812 CERVICAL SPONDYLOSIS: ICD-10-CM

## 2020-03-09 DIAGNOSIS — M47.816 LUMBAR SPONDYLOSIS: ICD-10-CM

## 2020-03-09 DIAGNOSIS — M51.36 DDD (DEGENERATIVE DISC DISEASE), LUMBAR: ICD-10-CM

## 2020-03-09 DIAGNOSIS — M54.2 CHRONIC NECK PAIN: ICD-10-CM

## 2020-03-09 DIAGNOSIS — G89.29 CHRONIC NECK PAIN: ICD-10-CM

## 2020-03-09 PROCEDURE — 3008F BODY MASS INDEX DOCD: CPT | Mod: CPTII,S$GLB,, | Performed by: NEUROLOGICAL SURGERY

## 2020-03-09 PROCEDURE — 99215 OFFICE O/P EST HI 40 MIN: CPT | Mod: S$GLB,,, | Performed by: NEUROLOGICAL SURGERY

## 2020-03-09 PROCEDURE — 3008F PR BODY MASS INDEX (BMI) DOCUMENTED: ICD-10-PCS | Mod: CPTII,S$GLB,, | Performed by: NEUROLOGICAL SURGERY

## 2020-03-09 PROCEDURE — 99215 PR OFFICE/OUTPT VISIT, EST, LEVL V, 40-54 MIN: ICD-10-PCS | Mod: S$GLB,,, | Performed by: NEUROLOGICAL SURGERY

## 2020-03-09 PROCEDURE — 99999 PR PBB SHADOW E&M-EST. PATIENT-LVL IV: CPT | Mod: PBBFAC,,, | Performed by: NEUROLOGICAL SURGERY

## 2020-03-09 PROCEDURE — 99999 PR PBB SHADOW E&M-EST. PATIENT-LVL IV: ICD-10-PCS | Mod: PBBFAC,,, | Performed by: NEUROLOGICAL SURGERY

## 2020-03-09 RX ORDER — TIZANIDINE 4 MG/1
4 TABLET ORAL EVERY 8 HOURS PRN
Qty: 90 TABLET | Refills: 0 | Status: SHIPPED | OUTPATIENT
Start: 2020-03-09 | End: 2020-04-12

## 2020-03-09 RX ORDER — IBUPROFEN 800 MG/1
800 TABLET ORAL 3 TIMES DAILY PRN
Qty: 30 TABLET | Refills: 0 | Status: SHIPPED | OUTPATIENT
Start: 2020-03-09 | End: 2020-05-26 | Stop reason: SDUPTHER

## 2020-03-12 NOTE — PROGRESS NOTES
CHIEF COMPLAINT:  Chief Complaint   Patient presents with    Follow-up    Neck Pain       HPI:  Anmol Angeles Jr. is a 49 y.o.  male with below listed PMH, who is referred for evaluation of neck pain.    Reports pain, attention, and stiffness between his shoulder blades.  He reports decreased range of motion and tension in his neck in all positions except for flexion.  He denies any numbness but reports tingling in the arm and around his neck.  He denies any weakness.  He states that he has to watch his posture and his neck movements to prevent triggering the pain.  He believes the neck pain started after an injury doing PT for his low back.      He has diffuse stenosis in the cervical spine.  He denies any gait disturbance and although he is occasionally clumsy he denies dropping items from his hands or having issues with fine motor skills such as buttoning shirts.  He denies any bowel or bladder issues.      He works various jobs as a , lawncare, and .    PT - none for neck, injured neck during lumbar PT  Injections - low back only    Review of patient's allergies indicates:   Allergen Reactions    Latex, natural rubber        Past Medical History:   Diagnosis Date    Anxiety     Back pain     Depressive disorder, not elsewhere classified 11/25/2014    Perirectal fistula     Seizures      Past Surgical History:   Procedure Laterality Date    BACK SURGERY      CAUDAL EPIDURAL STEROID INJECTION N/A 10/10/2019    Procedure: Injection-steroid-epidural-caudal;  Surgeon: Amol Grady Jr., MD;  Location: [facility];  Service: Pain Management;  Laterality: N/A;    COLONOSCOPY N/A 8/22/2016    Procedure: COLONOSCOPY;  Surgeon: TROY Inman MD;  Location: [facility] ENDO (4TH FLR);  Service: Endoscopy;  Laterality: N/A;    EXAMINATION UNDER ANESTHESIA N/A 10/12/2018    Procedure: Exam under anesthesia;  Surgeon: TROY Inman MD;  Location: [facility] OR 2ND FLR;  Service: Colon and Rectal;   Laterality: N/A;    fistula surgery      HAND SURGERY Left     INSERTION OF SETON STITCH N/A 10/12/2018    Procedure: PLACEMENT, SETON STITCH;  Surgeon: TROY Inman MD;  Location: [facility] OR [address] [place] [address];  Service: Colon and Rectal;  Laterality: N/A;    LEG SURGERY      plate    LEG SURGERY Right      History reviewed. No pertinent family history.  Social History     Tobacco Use    Smoking status: Current Every Day Smoker     Packs/day: 1.50     Years: 30.00     Pack years: 45.00     Types: Cigarettes    Smokeless tobacco: Never Used   Substance Use Topics    Alcohol use: Yes     Comment: beer occasionally     Drug use: Yes     Types: Marijuana        Review of Systems   Constitutional: Negative.    Respiratory: Negative for cough and shortness of breath.    Cardiovascular: Negative for chest pain, palpitations, claudication and leg swelling.   Gastrointestinal: Negative for abdominal pain, constipation and diarrhea.   Genitourinary: Negative for flank pain, frequency and urgency.   Musculoskeletal: Positive for neck pain. Negative for back pain, falls, joint pain and myalgias.   Skin: Negative.    Neurological: Positive for tingling. Negative for dizziness, tremors, sensory change, speech change, focal weakness, seizures, loss of consciousness, weakness and headaches.   Psychiatric/Behavioral: Negative.        OBJECTIVE:   Vital Signs:  Pulse: 78 (03/09/20 1408)  Resp: 16 (03/09/20 1408)  BP: (!) 142/91 (03/09/20 1408)    Physical Exam:  Constitutional: Patient sitting comfortably in chair. Appears well developed and well nourished.  Skin: Exposed areas are intact without abnormal markings, rashes or other lesions.  HEENT: Normocephalic. Normal conjunctivae.  Cardiovascular: Normal rate and regular rhythm.  Respiratory: Chest wall rises and falls symmetrically, without signs of respiratory distress.  Abdomen: Soft and non-tender.  Extremities: Warm and without edema. Calves supple,  non-tender.  Psych/Behavior: Normal affect.    Neurological:    Mental status: Alert and oriented. Conversational and appropriate.       Cranial Nerves: VFF to confrontation. PERRL. EOMI without nystagmus. Facial STLT normal and symmetric. Strong, symmetric muscles of mastication. Facial strength full and symmetric. Hearing equal bilaterally to finger rub. Palate and uvula rise and fall normally in midline. Shoulder shrug 5/5 strength. Tongue midline.     Motor:    Upper:  Deltoids Triceps Biceps WE WF  FA    R 5/5 5/5 5/5 5/5 5/5 5/5 5/5    L 5/5 5/5 5/5 5/5 5/5 5/5 5/5      Lower:  HF KE KF DF PF EHL    R 5/5 5/5 5/5 5/5 5/5 5/5    L 5/5 5/5 5/5 5/5 5/5 5/5     Sensory: Intact sensation to light touch and pinprick in all extremities.     Reflexes:      DTR: 2+ knees and biceps symmetrically.     Chris's: Negative.     Babinski's: Negative.     Clonus: Negative.    Cerebellar: Finger-to-nose and rapid alternating movements normal.     Gait:  Stable, fluid.    Spine:    Posture: Head well aligned over pelvis and shoulders in front and side views.  No focal or global spinal deformity visible on inspection.     Cervical:      ROM: Full with flexion, extension, lateral rotation and ear-to-shoulder bend.      Midline TTP: Negative.     Spurling's test: Negative.     Lhermitte's: Negative.    Thoracic:     Midline TTP: Negative    Lumbar:     Midline TTP: Negative     Straight Leg Test: Negative     Crossed Straight Leg Test: Negative    Other:     SI joint TTP: Negative.     Tenderness with external/internal hip rotation: Negative.    Diagnostic Results:  All imaging was independently reviewed by me.    MRI C spine, dated 11/29/19:  1. Moderate to moderate-severe CS at C3-4 to C6-7  2. Possible myelomalacia at C3-4    CT C spine, dated 11/14/19:  1. Diffuse degen changes      ASSESSMENT/PLAN:     Problem List Items Addressed This Visit        Neuro    Cervical spinal stenosis    Relevant Orders    Ambulatory  referral/consult to Physical Therapy    Ambulatory referral/consult to Sports Medicine    Cervical spondylosis    Relevant Orders    Ambulatory referral/consult to Physical Therapy    Ambulatory referral/consult to Sports Medicine    DDD (degenerative disc disease), cervical - Primary    Relevant Orders    Ambulatory referral/consult to Physical Therapy    Ambulatory referral/consult to Sports Medicine    DDD (degenerative disc disease), lumbar    Relevant Medications    ibuprofen (ADVIL,MOTRIN) 800 MG tablet    Lumbar radiculopathy    Relevant Medications    ibuprofen (ADVIL,MOTRIN) 800 MG tablet    Lumbar spondylosis    Relevant Medications    ibuprofen (ADVIL,MOTRIN) 800 MG tablet       Orthopedic    Chronic neck pain    Relevant Orders    Ambulatory referral/consult to Physical Therapy    Ambulatory referral/consult to Sports Medicine          VISIT SUMMARY:  Patient has both neck and low back pain.  Although he has diffuse moderate to moderate severe stenosis with encroachment on the spinal cord, he does not exhibit signs or symptoms consistent with myelopathy.  His neck pain is most likely due to advanced degeneration of the cervical spine.  I would recommend a full course of conservative therapy before entertaining surgery.  I referred pain medicine and to physical therapy as well as to Dr. Hennessy for OMT.    PATIENT EDUCATION:  More than half the clinic visit was spent showing with patient the pertinent findings on imaging and educating the patient about natural history of the pathology.   We discussed options for treatment as well as the risks and benefits of each option.  All questions were answered.     The patient understands and agrees with the following plan of care.    - Referral to pain medicine  - Referral to PT  - Referral to Dr Hennessy for OMT  - Ordered ibuprofen 800 and zanaflex  - RTC in 3 months, sooner if sxs worsen                .

## 2020-05-26 ENCOUNTER — OFFICE VISIT (OUTPATIENT)
Dept: NEUROSURGERY | Facility: CLINIC | Age: 50
End: 2020-05-26
Payer: MEDICARE

## 2020-05-26 VITALS
DIASTOLIC BLOOD PRESSURE: 87 MMHG | SYSTOLIC BLOOD PRESSURE: 145 MMHG | TEMPERATURE: 98 F | BODY MASS INDEX: 24.99 KG/M2 | WEIGHT: 194.69 LBS | HEART RATE: 87 BPM | OXYGEN SATURATION: 95 %

## 2020-05-26 DIAGNOSIS — M47.816 LUMBAR SPONDYLOSIS: ICD-10-CM

## 2020-05-26 DIAGNOSIS — M48.02 CERVICAL SPINAL STENOSIS: Primary | ICD-10-CM

## 2020-05-26 DIAGNOSIS — M96.1 POSTLAMINECTOMY SYNDROME OF LUMBAR REGION: ICD-10-CM

## 2020-05-26 DIAGNOSIS — M51.36 DDD (DEGENERATIVE DISC DISEASE), LUMBAR: ICD-10-CM

## 2020-05-26 DIAGNOSIS — M54.16 LUMBAR RADICULOPATHY: ICD-10-CM

## 2020-05-26 PROCEDURE — 99999 PR PBB SHADOW E&M-EST. PATIENT-LVL III: CPT | Mod: PBBFAC,,, | Performed by: PHYSICIAN ASSISTANT

## 2020-05-26 PROCEDURE — 99213 OFFICE O/P EST LOW 20 MIN: CPT | Mod: S$GLB,,, | Performed by: PHYSICIAN ASSISTANT

## 2020-05-26 PROCEDURE — 99999 PR PBB SHADOW E&M-EST. PATIENT-LVL III: ICD-10-PCS | Mod: PBBFAC,,, | Performed by: PHYSICIAN ASSISTANT

## 2020-05-26 PROCEDURE — 99213 PR OFFICE/OUTPT VISIT, EST, LEVL III, 20-29 MIN: ICD-10-PCS | Mod: S$GLB,,, | Performed by: PHYSICIAN ASSISTANT

## 2020-05-26 PROCEDURE — 3008F PR BODY MASS INDEX (BMI) DOCUMENTED: ICD-10-PCS | Mod: CPTII,S$GLB,, | Performed by: PHYSICIAN ASSISTANT

## 2020-05-26 PROCEDURE — 3008F BODY MASS INDEX DOCD: CPT | Mod: CPTII,S$GLB,, | Performed by: PHYSICIAN ASSISTANT

## 2020-05-26 RX ORDER — IBUPROFEN 800 MG/1
800 TABLET ORAL 3 TIMES DAILY PRN
Qty: 30 TABLET | Refills: 0 | Status: SHIPPED | OUTPATIENT
Start: 2020-05-26 | End: 2020-08-24

## 2020-05-26 NOTE — PROGRESS NOTES
[facility]  Neurosurgery    SUBJECTIVE:     Interval history 05/26/2020:  Patient returns to clinic today for follow-up of neck and low back pain.  He saw Dr. Amin in March and was referred to physical therapy, pain management, and Dr. Hennessy for OMT.  He was unable to proceed with these referrals due to the COVID-19 shot down.  Today he reports worsening of his sxs. He is dropping his cigarettes more frequently and has difficulty opening jars. He also now endorses an electrical sensation throughout his entire body that causes a buzzing feeling, his hair to stand up, and goose bumps all over his body. The sensation occurs several times per day and is independent of temperature or environment. Further endorses tingling in his fingertips in the mornings.     Also new is erectile dysfunction and worsened left low back, groin, and side pain. Increased back pain began after moving some tools. Gait is steady, but it is slightly altered since braking his foot last month. He did not seek treatment due to COVID-19.     History of Present Illness 12/2019:  Anmol Angeles Jr. is a 49 y.o. male with complex medical history as listed below who presents with neck and low back pain for evaluation of severe cervical stenosis as a referral from Dr. Grady. He was being treated by Dr. Grady for chronic low back pain and BLE pain, for which he has failed multiple STEPHENIE. He began to complain of increasing neck pain, so cervcial MRI was ordered and revealed severe central stenosis.     Today, patient endorses 7/10 neck and 8/10 back pain. Neck pain began on approximately 11/13/19 when he began physical therapy. He denies numbness, weakness, dropping items from his hands, b/b dysfunction, SA, and falls. He does, however,  report recently dropping his cigarettes without explanation. Endorses vertigo when leaning his head back.     Patient is a somewhat poor historian when attempting to determine history of neck symptoms.  He reports that two years ago he was working on a truck that fell on his head/shoulders leaving him paralyzed for 3 minutes. He did not seek medical attention because the sxs wore off within 5 minutes. He also reports a concussion 5 years ago that left him disabled.       (Not in a hospital admission)    Review of patient's allergies indicates:   Allergen Reactions    Latex, natural rubber        Past Medical History:   Diagnosis Date    Anxiety     Back pain     Depressive disorder, not elsewhere classified 11/25/2014    Perirectal fistula     Seizures      Past Surgical History:   Procedure Laterality Date    BACK SURGERY      CAUDAL EPIDURAL STEROID INJECTION N/A 10/10/2019    Procedure: Injection-steroid-epidural-caudal;  Surgeon: Amol Grady Jr., MD;  Location: [facility] OR;  Service: Pain Management;  Laterality: N/A;    COLONOSCOPY N/A 8/22/2016    Procedure: COLONOSCOPY;  Surgeon: TROY Inman MD;  Location: [facility] (4TH FLR);  Service: Endoscopy;  Laterality: N/A;    EXAMINATION UNDER ANESTHESIA N/A 10/12/2018    Procedure: Exam under anesthesia;  Surgeon: TROY Inman MD;  Location: [facility] 2ND FLR;  Service: Colon and Rectal;  Laterality: N/A;    fistula surgery      HAND SURGERY Left     INSERTION OF SETON STITCH N/A 10/12/2018    Procedure: PLACEMENT, SETON STITCH;  Surgeon: TROY Inman MD;  Location: [facility] OR 2ND FLR;  Service: Colon and Rectal;  Laterality: N/A;    LEG SURGERY      plate    LEG SURGERY Right      No family history on file.  Social History     Tobacco Use    Smoking status: Current Every Day Smoker     Packs/day: 1.50     Years: 30.00     Pack years: 45.00     Types: Cigarettes    Smokeless tobacco: Never Used   Substance Use Topics    Alcohol use: Yes     Comment: beer occasionally     Drug use: Yes     Types: Marijuana        Review of Systems:  As noted in HPI     OBJECTIVE:     Vital Signs (Most Recent):       Physical Exam:  General: well  developed, well nourished, no distress  Head: normocephalic, atraumatic  Neurologic: Alert and oriented. Thought content appropriate  GCS: Motor: 6/Verbal: 5/Eyes: 4 GCS Total: 15  Language: No aphasia  Speech: No dysarthria  Cranial nerves: face symmetric, tongue midline, CN II-XII grossly intact.   Eyes: pupils equal, round, reactive to light with accommodation, EOMI.   Pulmonary: normal respirations, not labored, no accessory muscles used  Sensory: intact to light touch throughout; tingling to C5 and T1 bilaterally. Increased sensation felt throughout bilateral thighs with palpation of knees and distal thighs.   Motor Strength: Moves all extremities spontaneously with good tone.  Full strength upper and lower extremities. No abnormal movements seen.     Strength  Deltoids Triceps Biceps Wrist Extension Wrist Flexion Hand    Upper: R 5/5 5/5 5/5 5/5 5/5 5/5    L 5/5 5/5 5/5 5/5 5/5 5/5     Iliopsoas Quadriceps Knee  Flexion Tibialis  anterior Gastro- cnemius    Lower: R 5/5 5/5 5/5 5/5 5/5     L 5/5 5/5 5/5 5/5 5/5      Chris: not detected   Clonus: absent  Skin: warm, dry and intact, no rashes  Gait: normal      Diagnostic Results:  I have personally reviewed imaging and agree with the findings.     Cervical MRI 11/29/19  -Mild cord edema at C3-4 with left paracentral disc protrusion and facet arthropathy  Moderate asymmetric left-sided spinal canal and foraminal narrowing at C4-5 secondary to disc protrusion and facet arthropathy  -Bilateral foraminal narrowing at C5-6 more significant on the right side.  -mild spinal stenosis and bilateral foraminal narrowing at C6-7    ASSESSMENT/PLAN:     Anmol Angeles . is a 49 y.o. male who presents for follow-up of severe cervical stenosis in the setting of worsening neck pain and chronic low back pain.  He saw Dr. Amin in March of this year and was referred for comprehensive conservative treatment prior to considering surgical intervention.  He was unable to  participate in PT, pain management, or OMT due to the COVID-19 shut down. In March, he was not experiencing overt signs of myelopathy. However, he now reports weakness, erectile dysfunction, and increased frequency of dropping his cigarettes. Also new is a buzzing sensation throughout his entire body.     I suspect increased back pain and possibly ED is due to concurrent lumbar pathology that may require additional workup. But I would like for the patient to FU with Dr. Amin first to discuss surgical options for his neck. Will schedule next available FU.     Patient understands that he needs to quit smoking, modify lifestyle habits, and plan to be out of work for an extended period of time following any surgery.       Please feel free to call with any further questions    Disclaimer: This note was dictated by speech recognition. Minor errors in transcription may be present.  Please call with any questions.      Nany Garces PA-C  [facility]  Department of Neurosurgery  [phone]      .

## 2020-06-01 ENCOUNTER — OFFICE VISIT (OUTPATIENT)
Dept: NEUROSURGERY | Facility: CLINIC | Age: 50
End: 2020-06-01
Payer: MEDICARE

## 2020-06-01 ENCOUNTER — HOSPITAL ENCOUNTER (OUTPATIENT)
Dept: RADIOLOGY | Facility: HOSPITAL | Age: 50
Discharge: HOME | End: 2020-06-01
Attending: NEUROLOGICAL SURGERY
Payer: MEDICARE

## 2020-06-01 ENCOUNTER — TELEPHONE (OUTPATIENT)
Dept: PAIN MEDICINE | Facility: CLINIC | Age: 50
End: 2020-06-01

## 2020-06-01 VITALS
WEIGHT: 192.88 LBS | BODY MASS INDEX: 24.75 KG/M2 | HEART RATE: 72 BPM | HEIGHT: 74 IN | RESPIRATION RATE: 16 BRPM | DIASTOLIC BLOOD PRESSURE: 85 MMHG | SYSTOLIC BLOOD PRESSURE: 145 MMHG

## 2020-06-01 DIAGNOSIS — M47.816 LUMBAR SPONDYLOSIS: ICD-10-CM

## 2020-06-01 DIAGNOSIS — M51.36 DDD (DEGENERATIVE DISC DISEASE), LUMBAR: ICD-10-CM

## 2020-06-01 DIAGNOSIS — M96.1 POSTLAMINECTOMY SYNDROME OF LUMBAR REGION: ICD-10-CM

## 2020-06-01 DIAGNOSIS — M54.42 CHRONIC LEFT-SIDED LOW BACK PAIN WITH LEFT-SIDED SCIATICA: Primary | ICD-10-CM

## 2020-06-01 DIAGNOSIS — G89.29 CHRONIC LEFT-SIDED LOW BACK PAIN WITH LEFT-SIDED SCIATICA: Primary | ICD-10-CM

## 2020-06-01 DIAGNOSIS — M48.02 CERVICAL SPINAL STENOSIS: ICD-10-CM

## 2020-06-01 DIAGNOSIS — M50.30 DDD (DEGENERATIVE DISC DISEASE), CERVICAL: ICD-10-CM

## 2020-06-01 DIAGNOSIS — M47.812 CERVICAL SPONDYLOSIS: ICD-10-CM

## 2020-06-01 PROCEDURE — 3008F PR BODY MASS INDEX (BMI) DOCUMENTED: ICD-10-PCS | Mod: CPTII,S$GLB,, | Performed by: NEUROLOGICAL SURGERY

## 2020-06-01 PROCEDURE — 72120 X-RAY BEND ONLY L-S SPINE: CPT | Mod: 26,,, | Performed by: RADIOLOGY

## 2020-06-01 PROCEDURE — 72100 X-RAY EXAM L-S SPINE 2/3 VWS: CPT | Mod: 26,,, | Performed by: RADIOLOGY

## 2020-06-01 PROCEDURE — 99215 OFFICE O/P EST HI 40 MIN: CPT | Mod: S$GLB,,, | Performed by: NEUROLOGICAL SURGERY

## 2020-06-01 PROCEDURE — 99999 PR PBB SHADOW E&M-EST. PATIENT-LVL IV: ICD-10-PCS | Mod: PBBFAC,,, | Performed by: NEUROLOGICAL SURGERY

## 2020-06-01 PROCEDURE — 3008F BODY MASS INDEX DOCD: CPT | Mod: CPTII,S$GLB,, | Performed by: NEUROLOGICAL SURGERY

## 2020-06-01 PROCEDURE — 72100 XR LUMBAR SPINE AP AND LAT WITH FLEX/EXT: ICD-10-PCS | Mod: 26,,, | Performed by: RADIOLOGY

## 2020-06-01 PROCEDURE — 72120 XR LUMBAR SPINE AP AND LAT WITH FLEX/EXT: ICD-10-PCS | Mod: 26,,, | Performed by: RADIOLOGY

## 2020-06-01 PROCEDURE — 99999 PR PBB SHADOW E&M-EST. PATIENT-LVL IV: CPT | Mod: PBBFAC,,, | Performed by: NEUROLOGICAL SURGERY

## 2020-06-01 PROCEDURE — 73502 XR HIP 2 VIEW LEFT: ICD-10-PCS | Mod: 26,LT,, | Performed by: RADIOLOGY

## 2020-06-01 PROCEDURE — 73502 X-RAY EXAM HIP UNI 2-3 VIEWS: CPT | Mod: 26,LT,, | Performed by: RADIOLOGY

## 2020-06-01 PROCEDURE — 99215 PR OFFICE/OUTPT VISIT, EST, LEVL V, 40-54 MIN: ICD-10-PCS | Mod: S$GLB,,, | Performed by: NEUROLOGICAL SURGERY

## 2020-06-01 PROCEDURE — 72120 X-RAY BEND ONLY L-S SPINE: CPT | Mod: TC,FY

## 2020-06-01 PROCEDURE — 73502 X-RAY EXAM HIP UNI 2-3 VIEWS: CPT | Mod: TC,FY,LT

## 2020-06-01 RX ORDER — MELOXICAM 7.5 MG/1
7.5 TABLET ORAL DAILY
Qty: 30 TABLET | Refills: 0 | Status: SHIPPED | OUTPATIENT
Start: 2020-06-01 | End: 2021-03-30

## 2020-06-01 NOTE — TELEPHONE ENCOUNTER
----- Message from Aga Vance sent at 6/1/2020  1:14 PM CDT -----  Pt is calling to speak with the nurse and would like for the nurse to give him a call back

## 2020-06-02 ENCOUNTER — PATIENT OUTREACH (OUTPATIENT)
Dept: ADMINISTRATIVE | Facility: OTHER | Age: 50
End: 2020-06-02

## 2020-06-02 NOTE — PROGRESS NOTES
Patient's chart was reviewed.   Requested updates within Care Everywhere.  Immunizations failed.  Not in LINKS  Health Maintenance was updated.

## 2020-06-03 ENCOUNTER — OFFICE VISIT (OUTPATIENT)
Dept: PAIN MEDICINE | Facility: CLINIC | Age: 50
End: 2020-06-03
Payer: MEDICARE

## 2020-06-03 VITALS
DIASTOLIC BLOOD PRESSURE: 70 MMHG | OXYGEN SATURATION: 99 % | SYSTOLIC BLOOD PRESSURE: 130 MMHG | WEIGHT: 196.19 LBS | BODY MASS INDEX: 25.19 KG/M2 | RESPIRATION RATE: 18 BRPM | TEMPERATURE: 98 F | HEART RATE: 69 BPM

## 2020-06-03 DIAGNOSIS — M51.36 DDD (DEGENERATIVE DISC DISEASE), LUMBAR: Primary | ICD-10-CM

## 2020-06-03 DIAGNOSIS — M54.16 LUMBAR RADICULOPATHY: ICD-10-CM

## 2020-06-03 DIAGNOSIS — M50.30 DDD (DEGENERATIVE DISC DISEASE), CERVICAL: ICD-10-CM

## 2020-06-03 DIAGNOSIS — M47.816 LUMBAR SPONDYLOSIS: ICD-10-CM

## 2020-06-03 DIAGNOSIS — M48.02 CERVICAL SPINAL STENOSIS: ICD-10-CM

## 2020-06-03 PROCEDURE — 99214 PR OFFICE/OUTPT VISIT, EST, LEVL IV, 30-39 MIN: ICD-10-PCS | Mod: S$GLB,,, | Performed by: REGISTERED NURSE

## 2020-06-03 PROCEDURE — 3008F BODY MASS INDEX DOCD: CPT | Mod: CPTII,S$GLB,, | Performed by: REGISTERED NURSE

## 2020-06-03 PROCEDURE — 3008F PR BODY MASS INDEX (BMI) DOCUMENTED: ICD-10-PCS | Mod: CPTII,S$GLB,, | Performed by: REGISTERED NURSE

## 2020-06-03 PROCEDURE — 99999 PR PBB SHADOW E&M-EST. PATIENT-LVL III: ICD-10-PCS | Mod: PBBFAC,,, | Performed by: REGISTERED NURSE

## 2020-06-03 PROCEDURE — 99999 PR PBB SHADOW E&M-EST. PATIENT-LVL III: CPT | Mod: PBBFAC,,, | Performed by: REGISTERED NURSE

## 2020-06-03 PROCEDURE — 99214 OFFICE O/P EST MOD 30 MIN: CPT | Mod: S$GLB,,, | Performed by: REGISTERED NURSE

## 2020-06-03 NOTE — PROGRESS NOTES
"Subjective:     Patient ID: Anmol Angeles Jr. is a 49 y.o. male    Chief Complaint: Back Pain    Referred by: No ref. provider found      HPI:    Interval History NP (6/3/2020):    Pt returns today for follow up. He states that he has noticed an increase in low back pain over the last month. He describes this as "sciatica" on his left side. He is also complaining of "cold bumps" going down his left thigh and arm. This is not new but happens "30-40 times a day". He reports that his past epidural in October helped minimally but he would like to try it again. He is also still having neck pain and stiffness, but denies changes in the quality or location. Upon his last visit with Neurology on 6/2/2020, they do not recommend neck or back surgery at this time and referred him back to PT for conservative management. He voiced his concern for returning to PT with the amount of pain he is currently in and the possibility of making his neck or back pain worse by injuring something. He is willing to try PT again in the future once he gets some of his pain under control. He denies bowel and bladder dysfunction. 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 He did bring up trying gabapentin at one time, but did not like having to take it three times daily so he stopped. He did start Mobic yesterday from Dr. Amin, which has given him some relief. He is happy with this regimen for now.      Interval History (12/12/19):  He returns today for follow up and MRI review.  He reports that his symptoms have been unchanged since last encounter.  He denies any new or worsening symptoms.  He has an appointment with neurosurgery on 12/16/19.      Interval History (11/25/19):  He returns today for follow up.  He reports that caudal epidural steroid injection has not been helpful for the low back pain.  He denies any changes in the quality or location was low back pain.  He attended physical therapy for his low back " during of his sessions began to have some mild neck pain. This pain developed into severe neck pain and stiffness with associated vertigo.  Patient was evaluated emergency room.  Patient states pain is interfering with his sleep.  He denies any numbness, tingling, weakness, bowel bladder dysfunction.  He states that he has taken Robaxin and this medicine help with his pain and help him to relax of aching sleep.      Initial Encounter (10/1/19):  Anmol Angeles Jr. is a 49 y.o. male who presents today with chronic bilateral low back pain that radiates the right lower extremity.  This pain has been present for many years.  No specific inciting event or injury noted.  Patient is status post low back surgery before 2005 for this issue.  He states that this surgery mostly help with his pain except for an occasional flare up that would last for about 3-4 days.  His current episode of pain has lasted for 2-3 months and involves left-sided low back and lower extremity.  He also reports some tightness in the leg.  Pain is constant worse with activity.  Pain is also worse with initiating activity after rest.  He reports diffuse numbness and tingling throughout his body.  He denies any weakness.  He denies any bowel or bladder dysfunction.    Physical Therapy:  Yes.    Non-pharmacologic Treatment:  Rest helps         · TENS?  No    Pain Medications:         · Currently taking:  Ibuprofen, mobic    · Has tried in the past:  Medrol Dosepak, Flexeril, Robaxin, gabapentin    · Has not tried:  Opioids, Tylenol, TCAs, SNRIs, anticonvulsants, topical creams    Blood thinners:  None    Interventional Therapies:    10/10/19 - caudal epidural steroid injection - no relief noted    Relevant Surgeries:  Previous low back surgery before 2005    Affecting sleep?  Yes    Affecting daily activities? yes    Depressive symptoms? yes          · SI/HI? No    Work status: Disabled    Pain Scores:    Best:    4/10  Worst:   10/10  Usually:   8/10  Today:  8/10    Review of Systems   Constitutional: Negative for activity change, appetite change, chills, fatigue, fever and unexpected weight change.   HENT: Negative for hearing loss.    Eyes: Negative for visual disturbance.   Respiratory: Negative for chest tightness and shortness of breath.    Cardiovascular: Negative for chest pain.   Gastrointestinal: Negative for abdominal pain, constipation, diarrhea, nausea and vomiting.   Genitourinary: Negative for difficulty urinating.   Musculoskeletal: Positive for arthralgias, back pain, gait problem, myalgias, neck pain and neck stiffness.   Skin: Negative for rash.   Neurological: Positive for weakness and numbness. Negative for dizziness, light-headedness and headaches.   Psychiatric/Behavioral: Positive for sleep disturbance. Negative for hallucinations and suicidal ideas. The patient is not nervous/anxious.        Past Medical History:   Diagnosis Date    Anxiety     Back pain     Colon polyp     Depressive disorder, not elsewhere classified 11/25/2014    Perirectal fistula     Seizures        Past Surgical History:   Procedure Laterality Date    BACK SURGERY      CAUDAL EPIDURAL STEROID INJECTION N/A 10/10/2019    Procedure: Injection-steroid-epidural-caudal;  Surgeon: Amol Grady Jr., MD;  Location: [facility];  Service: Pain Management;  Laterality: N/A;    COLONOSCOPY N/A 8/22/2016    Procedure: COLONOSCOPY;  Surgeon: TROY Inman MD;  Location: [facility] ([facility]);  Service: Endoscopy;  Laterality: N/A;    EXAMINATION UNDER ANESTHESIA N/A 10/12/2018    Procedure: Exam under anesthesia;  Surgeon: TROY Inman MD;  Location: [facility] FLR;  Service: Colon and Rectal;  Laterality: N/A;    fistula surgery      HAND SURGERY Left     INSERTION OF SETON STITCH N/A 10/12/2018    Procedure: PLACEMENT, SETON STITCH;  Surgeon: TROY Inman MD;  Location: [facility] 2ND FLR;  Service: Colon and Rectal;  Laterality: N/A;    LEG  SURGERY      plate    LEG SURGERY Right        Social History     Socioeconomic History    Marital status: Single     Spouse name: Not on file    Number of children: Not on file    Years of education: Not on file    Highest education level: Not on file   Occupational History    Occupation: Maintenance with [employer]     Employer: [employer] [place]    Social Needs    Financial resource strain: Not on file    Food insecurity:     Worry: Not on file     Inability: Not on file    Transportation needs:     Medical: Not on file     Non-medical: Not on file   Tobacco Use    Smoking status: Current Every Day Smoker     Packs/day: 1.50     Years: 30.00     Pack years: 45.00     Types: Cigarettes    Smokeless tobacco: Never Used   Substance and Sexual Activity    Alcohol use: Yes     Comment: beer occasionally     Drug use: Yes     Types: Marijuana    Sexual activity: Yes   Lifestyle    Physical activity:     Days per week: Not on file     Minutes per session: Not on file    Stress: Not on file   Relationships    Social connections:     Talks on phone: Not on file     Gets together: Not on file     Attends Sabianism service: Not on file     Active member of club or organization: Not on file     Attends meetings of clubs or organizations: Not on file     Relationship status: Not on file   Other Topics Concern    Not on file   Social History Narrative    Not on file       Review of patient's allergies indicates:   Allergen Reactions    Latex, natural rubber        Current Outpatient Medications on File Prior to Visit   Medication Sig Dispense Refill    ibuprofen (ADVIL,MOTRIN) 800 MG tablet Take 1 tablet (800 mg total) by mouth 3 (three) times daily as needed for Pain. 30 tablet 0    meloxicam (MOBIC) 7.5 MG tablet Take 1 tablet (7.5 mg total) by mouth once daily. 30 tablet 0    methocarbamoL (ROBAXIN) 500 MG Tab Take 1 tablet (500 mg total) by mouth 4 (four) times daily as needed. 120 tablet 2     meclizine (ANTIVERT) 12.5 mg tablet Take 1 tablet (12.5 mg total) by mouth 3 (three) times daily as needed for Dizziness. (Patient not taking: Reported on 6/3/2020) 20 tablet 0    triamcinolone acetonide 0.1% (KENALOG) 0.1 % cream Apply topically 2 (two) times daily. (Patient not taking: Reported on 6/3/2020) 80 g 1     No current facility-administered medications on file prior to visit.        Objective:      /70 (BP Location: Left arm, Patient Position: Sitting)   Pulse 69   Temp 98.4 °F (36.9 °C) (Oral)   Resp 18   Wt 89 kg (196 lb 3.4 oz)   SpO2 99%   BMI 25.19 kg/m²     Exam:  GEN:  Well developed, well nourished.  No acute distress.  Normal pain behavior.  HEENT:  No trauma.  Mucous membranes moist.  Nares patent bilaterally.  PSYCH: Normal affect. Thought content appropriate.  CHEST:  Breathing symmetric.  No audible wheezing.  ABD: Soft, non-distended.  SKIN:  Warm, pink, dry.  No rash on exposed areas.    EXT:  No cyanosis, clubbing, or edema.  No color change or changes in nail or hair growth.  NEURO/MUSCULOSKELETAL:  Fully alert, oriented, and appropriate. Speech normal duc. No cranial nerve deficits.   Gait:   Antalgic.  5/5 motor strength throughout upper extremities.   Sensory:   no sensory deficit in the upper extremities.   Reflexes:   2 + and symmetric throughout.   absent Chris's bilaterally.  Lumbar Spine: +SLR on L. 4-/5 motor strength to L leg upon exam.   C-Spine:  very limited ROM with pain on all movements.  unable to assess facet loading bilaterally. unable to assess Spurling's bilaterally.              Imaging:      Narrative     EXAMINATION:  MRI CERVICAL SPINE WITHOUT CONTRAST    CLINICAL HISTORY:  cervical spinal stenosis;.  Other cervical disc degeneration, unspecified cervical region    TECHNIQUE:  Multiplanar, multisequence MR images of the cervical spine were acquired without the administration of contrast.    COMPARISON:  None.    FINDINGS:  Sagittal images  demonstrate the vertebrae to be anatomically aligned.  There are normal in height and signal intensity without an acute marrow placement process or bone marrow edema.  There is no compression deformity.  There is desiccation and disc space narrowing to a moderate degree at C5-6 and to a lesser degree C6-7.  Cervical cord is remarkable for edematous changes at the C3-4 disc level and to a less significant degree at the C5-6 level.  There is no mass lesion or cord expansion.  The paraspinal soft tissues are unremarkable.    C2-3.  Axial images show eccentric disc bulging and osteophyte complex to the right side along with facet and uncovertebral joint disease causing moderate right-sided foraminal narrowing.  There is no spinal stenosis.    C3-4.  Broad-based left paracentral protrusion of the disc impresses on the cervical cord facet and uncovertebral joint disease and to the moderate left-sided canal and foraminal narrowing.  Cord edema is evident without mass.    C4-5.  Asymmetric left-sided canal and foraminal narrowing is appreciated secondary to protrusion of the disc and uncovertebral joint disease.  There is also noted mild/moderate right-sided foraminal narrowing secondary to facet arthropathy.  There is no cord edema.    C5-6.  Bilateral foraminal narrowing is present.  This is moderate to severe on the right side secondary to facet and uncovertebral joint disease.  It is mild to moderate on the left side.  Spinal stenosis is minimal.    C6-7.  Mild spinal stenosis is present along with bilateral foraminal narrowing due to disc bulging and hypertrophic facet disease.    C7-T1.  No spinal stenosis or foraminal narrowing of significance.   Impression       1. Mild cord edema at the C3-4 level.  This is likely the result of recent trauma and moderate spinal stenosis.  There is a left paracentral disc protrusion along with facet arthropathy.  2. Moderate asymmetric left-sided spinal canal and foraminal narrowing  at C4-5 secondary to disc protrusion and facet arthropathy.  3. Bilateral foraminal narrowing at C5-6 more significant on the right side.  4. Mild spinal stenosis and bilateral foraminal narrowing at C6-7.      Electronically signed by: Brijesh Car  Date: 11/29/2019  Time: 08:51    Encounter     View Encounter                             Narrative     EXAMINATION:  MRI LUMBAR SPINE WITHOUT CONTRAST    CLINICAL HISTORY:  evaluate for lumbar annular tear, history of lumbar surgery;.  Other specified postprocedural states    TECHNIQUE:  Sagittal and axial T1 and T2 W images    COMPARISON:  Correlation is made to lumbar spine series 02/09/2015.    FINDINGS:  Sagittal images demonstrate straightening of lumbar lordosis with minimal retrolisthesis L5 on S1.  Alignment otherwise anatomic.    Disc degenerative change including desiccation and/or decreased height T12-L1 and L2-3 through L5-S1 discs.  Small multilevel anterior osteophytes noted.    L2-3, bulging of the disc is present with superimposed left paracentral protrusion resulting in moderate flattening of the adjacent ventral thecal sac and overall mild degree of canal stenosis.  Mild bilateral foraminal narrowing is also present.    L3-4, bulging of the disc is present with associated spondylosis and results in moderate effacement of the thecal sac, moderate left and mild right foraminal narrowing and overall mild to moderate degree of canal stenosis.    L4-5, mild bulging of the disc and associated spondylosis are present with mild impression on ventral thecal sac and moderate bilateral foraminal narrowing.    L5-S1, bulging of the disc and associated spondylosis are present with secondary fairly severe bilateral foraminal narrowing.    No disc herniation, canal compromise or foraminal compromise appreciated remaining lumbar or visualized lower thoracic disc levels.  The conus medullaris has a normal contour and signal.  Marrow degenerative change adjacent to the  L5-S1 disc.   Impression       Straightening of lumbar lordosis.    L2-3, bulging of the disc with superimposed left paracentral protrusion combination of findings resulting in moderate effacement ventral subarachnoid space and mild canal stenosis along with mild foraminal narrowing..    L3-4, bulging of the disc and associated spondylosis with moderate effacement ventral thecal sac and resulting in mild to moderate foraminal narrowing and overall mild to moderate degree of canal stenosis.    L4-5, mild bulging of the disc and associated spondylosis with moderate bilateral foraminal narrowing.    L5-S1, bulging of the disc and associated spondylosis with severe foraminal narrowing.      Electronically signed by: Kathy Mitchell MD  Date: 09/12/2019  Time: 11:02         Assessment:       Encounter Diagnoses   Name Primary?    DDD (degenerative disc disease), lumbar Yes    DDD (degenerative disc disease), cervical     Lumbar spondylosis     Lumbar radiculopathy     Cervical spinal stenosis          Plan:       Anmol was seen today for back and neck pain.    Diagnoses and all orders for this visit:    DDD (degenerative disc disease), lumbar    DDD (degenerative disc disease), cervical    Lumbar spondylosis    Lumbar radiculopathy    Cervical spinal stenosis        Anmol Agneles . is a 49 y.o. male with chronic bilateral low back pain that radiates to the bilateral lower extremities. Has indications of both lumbar facet pain and possibly pain from spinal stenosis or radiculopathy.  Lumbar MRI with multilevel disc degeneration and multilevel foraminal stenosis with multilevel spinal stenosis as well. No relief from caudal epidural steroid injection. Did have acute neck pain/stiffness upon last visit. Now with more acute back pain/ radicular symptoms today.  No overt signs of myelopathy.  Significant spinal stenosis noted on cervical MRI with possible myelomalacia. Recent flex/exten x-rays (6/1/2020) of lumbar  spine show that alignment is maintained.     1.  Pertinent imaging studies reviewed by me. Imaging results were discussed again with patient.  2.  Patient agreed to try another caudal epidural and will be scheduled for repeat with Dr. Grady.  3.  Patient would like PT set up once a decision is made if epidural provides him with any relief.   4.  No further interventions for neck pain/stiffness at this time given severe spinal stenosis of the cervical region.  5.  Follow-up 2 weeks post epidural.      VASILIY Kidd, FNP-C  [facility]  Interventional Pain Management

## 2020-06-07 NOTE — PROGRESS NOTES
CHIEF COMPLAINT:      Chief Complaint   Patient presents with    Follow-up    Neck Pain         INTERVAL HISTORY (6/7/20):  Patient returns for continued management of neck and back pain.  Patient has neck pain was the primary concern during his last visit however today he reports that it is better.  His main complaint today is low back pain and leg pain.  Pain is located over the left SI joint and wraps around the waist as well as radiates up to the ribcage.  He notes that he has pain in his testicles, hip joint and groin.  He also notes some change in temperature sensation in the left thigh that is intermittent.  He describes it as cold and tingling.  It comes on suddenly and will radiate up to his nipples.  This will cause him to have to stand up to help alleviate the sensation.  He also notes that this is accompanied by whole body goose bumps.  The pain an sensation change is made worse by sitting and better with standing.  He reports a recent incident of impotence.    Surgery - L5-S1 lami  Injections - caudual on 10/10/19 - no help  PT - previously for low back but believes he hurt his neck    HPI:  Anmol Angeles Jr. is a 49 y.o.  male with below listed PMH, who is referred for evaluation of neck pain.    Reports pain, attention, and stiffness between his shoulder blades.  He reports decreased range of motion and tension in his neck in all positions except for flexion.  He denies any numbness but reports tingling in the arm and around his neck.  He denies any weakness.  He states that he has to watch his posture and his neck movements to prevent triggering the pain.  He believes the neck pain started after an injury doing PT for his low back.       He has diffuse stenosis in the cervical spine.  He denies any gait disturbance and although he is occasionally clumsy he denies dropping items from his hands or having issues with fine motor skills such as buttoning shirts.  He denies any bowel or bladder  issues.       He works various jobs as a , lawncare, and .     PT - none for neck, injured neck during lumbar PT  Injections - low back only     Review of patient's allergies indicates:   Allergen Reactions    Latex, natural rubber                Past Medical History:   Diagnosis Date    Anxiety      Back pain      Depressive disorder, not elsewhere classified 11/25/2014    Perirectal fistula      Seizures              Past Surgical History:   Procedure Laterality Date    BACK SURGERY        CAUDAL EPIDURAL STEROID INJECTION N/A 10/10/2019     Procedure: Injection-steroid-epidural-caudal;  Surgeon: Amol Grady Jr., MD;  Location: [facility];  Service: Pain Management;  Laterality: N/A;    COLONOSCOPY N/A 8/22/2016     Procedure: COLONOSCOPY;  Surgeon: TROY Inman MD;  Location: [facility] (4TH FLR);  Service: Endoscopy;  Laterality: N/A;    EXAMINATION UNDER ANESTHESIA N/A 10/12/2018     Procedure: Exam under anesthesia;  Surgeon: TROY Inman MD;  Location: [facility] OR 2ND FLR;  Service: Colon and Rectal;  Laterality: N/A;    fistula surgery        HAND SURGERY Left      INSERTION OF SETON STITCH N/A 10/12/2018     Procedure: PLACEMENT, SETON STITCH;  Surgeon: TROY Inman MD;  Location: [facility] OR 2ND FLR;  Service: Colon and Rectal;  Laterality: N/A;    LEG SURGERY         plate    LEG SURGERY Right        History reviewed. No pertinent family history.  Social History            Tobacco Use    Smoking status: Current Every Day Smoker       Packs/day: 1.50       Years: 30.00       Pack years: 45.00       Types: Cigarettes    Smokeless tobacco: Never Used   Substance Use Topics    Alcohol use: Yes       Comment: beer occasionally     Drug use: Yes       Types: Marijuana         Review of Systems   Constitutional: Negative.    Respiratory: Negative for cough and shortness of breath.    Cardiovascular: Negative for chest pain, palpitations, claudication and leg swelling.    Gastrointestinal: Negative for abdominal pain, constipation and diarrhea.   Genitourinary: Negative for flank pain, frequency and urgency.   Musculoskeletal: Positive for neck pain. Negative for back pain, falls, joint pain and myalgias.   Skin: Negative.    Neurological: Positive for tingling. Negative for dizziness, tremors, sensory change, speech change, focal weakness, seizures, loss of consciousness, weakness and headaches.   Psychiatric/Behavioral: Negative.          OBJECTIVE:   Vital Signs:  Pulse: 78 (03/09/20 1408)  Resp: 16 (03/09/20 1408)  BP: (!) 142/91 (03/09/20 1408)     Physical Exam:  Constitutional: Patient sitting comfortably in chair. Appears well developed and well nourished.  Skin: Exposed areas are intact without abnormal markings, rashes or other lesions.  HEENT: Normocephalic. Normal conjunctivae.  Cardiovascular: Normal rate and regular rhythm.  Respiratory: Chest wall rises and falls symmetrically, without signs of respiratory distress.  Abdomen: Soft and non-tender.  Extremities: Warm and without edema. Calves supple, non-tender.  Psych/Behavior: Normal affect.     Neurological:     Mental status: Alert and oriented. Conversational and appropriate.       Cranial Nerves: VFF to confrontation. PERRL. EOMI without nystagmus. Facial STLT normal and symmetric. Strong, symmetric muscles of mastication. Facial strength full and symmetric. Hearing equal bilaterally to finger rub. Palate and uvula rise and fall normally in midline. Shoulder shrug 5/5 strength. Tongue midline.      Motor:     Upper:   Deltoids Triceps Biceps WE WF  FA     R 5/5 5/5 5/5 5/5 5/5 5/5 5/5     L 5/5 5/5 5/5 5/5 5/5 5/5 5/5      Lower:   HF KE KF DF PF EHL     R 5/5 5/5 5/5 5/5 5/5 5/5     L 5/5 5/5 5/5 5/5 5/5 5/5      Sensory: Intact sensation to light touch and pinprick in all extremities.      Reflexes:      DTR: 2+ knees and biceps symmetrically.     Chris's: Negative.     Babinski's: Negative.     Clonus:  Negative.     Cerebellar: Finger-to-nose and rapid alternating movements normal.      Gait:  Stable, fluid.     Spine:     Posture: Head well aligned over pelvis and shoulders in front and side views.  No focal or global spinal deformity visible on inspection.      Cervical:      ROM: Full with flexion, extension, lateral rotation and ear-to-shoulder bend.      Midline TTP: Negative.     Spurling's test: Negative.     Lhermitte's: Negative.     Thoracic:     Midline TTP: Negative     Lumbar:     Midline TTP: Negative     Straight Leg Test: Negative     Crossed Straight Leg Test: Negative     Other:     SI joint TTP: Negative.     Tenderness with external/internal hip rotation: Negative.     Diagnostic Results:  All imaging was independently reviewed by me.     Lumbar MRI, 9/12/20:  1. Diffuse DDD with straightening of lordosis  2. L2-3 mod L LRS 2/2 HNP, mild bilat NFS  3. L3-4 mild-mod CS, mod L NFS and mild-mod R NFS  4. L4-5 mild CS, mild-mod L NFS, mod R NFS  5. L5-S1 mild CS, mod R NFS, severe L NFS    MRI C spine, dated 11/29/19:  1. Moderate to moderate-severe CS at C3-4 to C6-7  2. Possible myelomalacia at C3-4     CT C spine, dated 11/14/19:  1. Diffuse degen changes        ASSESSMENT/PLAN:     Problem List Items Addressed This Visit        Neuro    Cervical spinal stenosis    Cervical spondylosis    DDD (degenerative disc disease), cervical    DDD (degenerative disc disease), lumbar    Relevant Orders    X-Ray Hip 2 or 3 views Left (Completed)    X-Ray Lumbar Spine Ap Lateral w/Flex Ext (Completed)    Lumbar spondylosis    Relevant Orders    X-Ray Hip 2 or 3 views Left (Completed)    X-Ray Lumbar Spine Ap Lateral w/Flex Ext (Completed)       Orthopedic    Chronic back pain - Primary    Relevant Orders    Ambulatory referral/consult to Physical/Occupational Therapy    Postlaminectomy syndrome of lumbar region    Relevant Orders    X-Ray Hip 2 or 3 views Left (Completed)    X-Ray Lumbar Spine Ap Lateral  w/Flex Ext (Completed)          VISIT SUMMARY:  Main complaint is left side low back and leg pain as well as changes in sensation, which are difficult to localize and not dermatomal.  Patient has diffuse degeneration and varying degrees of moderate stenosis but his sxs do not entirely correlate with the findings.  PT, manipulative treatments, and spinal injection were put on hold due to COVID.  Patient was preoccupied with getting narcotics, which I explained are not the best in this situation and that I do not prescribe them unless if perform surgery.  I will order more imaging to better understand.  He needs to complete full trial of conservative measures.     PATIENT EDUCATION:  More than half the clinic visit was spent showing with patient the pertinent findings on imaging and educating the patient about natural history of the pathology.   We discussed options for treatment as well as the risks and benefits of each option.  All questions were answered.      The patient understands and agrees with the following plan of care.     - F/u with pain medicine  - F/u with Dr Hennessy for OMT  - Ordered L hip xray re: hip and groin pain  - Ordered lumbar flex/ex  - Consider EMG to better localize sxs if not better  - Mobic

## 2020-07-02 ENCOUNTER — TELEPHONE (OUTPATIENT)
Dept: PAIN MEDICINE | Facility: CLINIC | Age: 50
End: 2020-07-02

## 2020-07-02 DIAGNOSIS — M51.36 DDD (DEGENERATIVE DISC DISEASE), LUMBAR: Primary | ICD-10-CM

## 2020-07-02 NOTE — TELEPHONE ENCOUNTER
Spoke with the patient about scheduling a Caudal STEPHENIE. He stated that he is looking forward to setting a date for the procedure. The date selected for the procedure is 7/16/2020.    Reviewed pre op instructions with patient:    1. Please leave jewelry and valuables at home or give them to your escort for safe keeping.  2. You will be required to have an adult to escort you after the procedure is over. Although we will not be sedating you for your procedure we ask for an escort for safety after the procedure.  3. Do not apply lotions, creams, deodorants, perfumes, or colognes the morning of the procedure.  4. Do not take OTC blood thinning medications beginning 7 days before the procedure (aspirin, Motrin, ibuprofen, Advil, Aleve, naproxen). If you are taking prescribed thinners (Coumadin, warfarin, apixaban, Eliquis, Plavix, clopidogrel, Pletal, etc) we will obtain cardiac clearance from your cardiologist or provider that is monitoring your medications and levels to hold the medications if appropriate.  5. Cleanse your skin the evening before with an antibacterial soap and then repeat it again the morning of the procedure.  6. You will be contacted 2-3 days prior to the procedure to discuss COVID testing and when to arrive the morning of the procedure.  7. Please do not eat or drink after midnight before the procedure.    Patient verbalized understanding of the instructions provided to them and clinic contact number was provided for any further questions they may have. No further issues discussed.

## 2020-07-02 NOTE — TELEPHONE ENCOUNTER
----- Message from Ginny De La Vega LPN sent at 6/26/2020 10:08 AM CDT -----  Regarding: FW: [place] Procedure  Bacilio, I am going through my staff messages and saw this.  ----- Message -----  From: Rosalia Gonzalez RN  Sent: 6/3/2020   9:05 AM CDT  To: Ginny De La Vega LPN  Subject: [place] Procedure                            Please contact  Anmol to schedule for a Caudal STEPHENIE at the [place] location with Moderate sedation.    Thanks

## 2020-08-04 ENCOUNTER — TELEPHONE (OUTPATIENT)
Dept: PAIN MEDICINE | Facility: CLINIC | Age: 50
End: 2020-08-04

## 2020-08-04 NOTE — TELEPHONE ENCOUNTER
----- Message from Alana Connell sent at 8/4/2020  1:39 PM CDT -----  Regarding: speak with the nurse  Contact: patient  [phone]-please call patient need to speak with the nurse said he has broken out in a rash could the Cortizone injection cause this by him being out in the sun waiting on a call back thanks.

## 2020-08-04 NOTE — TELEPHONE ENCOUNTER
Spoke with the patient about his skin reaction. He stated the rash occurred after being exposed to the sun. He was informed that it was not likely to be related to the recent injection done by the provider. Patient verbalized understanding of the information provided to him. No further issues discussed.

## 2020-09-01 ENCOUNTER — OFFICE VISIT (OUTPATIENT)
Dept: NEUROSURGERY | Facility: CLINIC | Age: 50
End: 2020-09-01
Payer: MEDICARE

## 2020-09-01 VITALS
SYSTOLIC BLOOD PRESSURE: 129 MMHG | DIASTOLIC BLOOD PRESSURE: 74 MMHG | WEIGHT: 197.75 LBS | HEART RATE: 84 BPM | BODY MASS INDEX: 25.38 KG/M2 | HEIGHT: 74 IN

## 2020-09-01 DIAGNOSIS — M48.02 CERVICAL SPINAL STENOSIS: ICD-10-CM

## 2020-09-01 DIAGNOSIS — G89.29 CHRONIC NECK PAIN: ICD-10-CM

## 2020-09-01 DIAGNOSIS — M54.2 CHRONIC NECK PAIN: ICD-10-CM

## 2020-09-01 DIAGNOSIS — M51.36 DDD (DEGENERATIVE DISC DISEASE), LUMBAR: Primary | ICD-10-CM

## 2020-09-01 DIAGNOSIS — M50.30 DDD (DEGENERATIVE DISC DISEASE), CERVICAL: ICD-10-CM

## 2020-09-01 PROCEDURE — 99999 PR PBB SHADOW E&M-EST. PATIENT-LVL III: ICD-10-PCS | Mod: PBBFAC,,, | Performed by: PHYSICIAN ASSISTANT

## 2020-09-01 PROCEDURE — 99213 OFFICE O/P EST LOW 20 MIN: CPT | Mod: S$GLB,,, | Performed by: PHYSICIAN ASSISTANT

## 2020-09-01 PROCEDURE — 3008F PR BODY MASS INDEX (BMI) DOCUMENTED: ICD-10-PCS | Mod: CPTII,S$GLB,, | Performed by: PHYSICIAN ASSISTANT

## 2020-09-01 PROCEDURE — 99213 PR OFFICE/OUTPT VISIT, EST, LEVL III, 20-29 MIN: ICD-10-PCS | Mod: S$GLB,,, | Performed by: PHYSICIAN ASSISTANT

## 2020-09-01 PROCEDURE — 3008F BODY MASS INDEX DOCD: CPT | Mod: CPTII,S$GLB,, | Performed by: PHYSICIAN ASSISTANT

## 2020-09-01 PROCEDURE — 99999 PR PBB SHADOW E&M-EST. PATIENT-LVL III: CPT | Mod: PBBFAC,,, | Performed by: PHYSICIAN ASSISTANT

## 2020-09-01 NOTE — PROGRESS NOTES
CHIEF COMPLAINT:      Chief Complaint   Patient presents with    Follow-up    Neck Pain        Interval history 9/1/20:  Patient returns to clinic today for FU of neck and back pain. He reports progression of his symptoms and is now willing to consider surgery for cervical stenosis.      INTERVAL HISTORY (6/7/20):  Patient returns for continued management of neck and back pain.  Patient has neck pain was the primary concern during his last visit however today he reports that it is better.  His main complaint today is low back pain and leg pain.  Pain is located over the left SI joint and wraps around the waist as well as radiates up to the ribcage.  He notes that he has pain in his testicles, hip joint and groin.  He also notes some change in temperature sensation in the left thigh that is intermittent.  He describes it as cold and tingling.  It comes on suddenly and will radiate up to his nipples.  This will cause him to have to stand up to help alleviate the sensation.  He also notes that this is accompanied by whole body goose bumps.  The pain an sensation change is made worse by sitting and better with standing.  He reports a recent incident of impotence.    Surgery - L5-S1 lami  Injections - caudual on 10/10/19 - no help  PT - previously for low back but believes he hurt his neck    HPI:  Anmol Robleromarc Pastor is a 49 y.o.  male with below listed PMH, who is referred for evaluation of neck pain.    Reports pain, attention, and stiffness between his shoulder blades.  He reports decreased range of motion and tension in his neck in all positions except for flexion.  He denies any numbness but reports tingling in the arm and around his neck.  He denies any weakness.  He states that he has to watch his posture and his neck movements to prevent triggering the pain.  He believes the neck pain started after an injury doing PT for his low back.       He has diffuse stenosis in the cervical spine.  He denies any gait  disturbance and although he is occasionally clumsy he denies dropping items from his hands or having issues with fine motor skills such as buttoning shirts.  He denies any bowel or bladder issues.       He works various jobs as a , lawncare, and .     PT - none for neck, injured neck during lumbar PT  Injections - low back only          Review of patient's allergies indicates:   Allergen Reactions    Latex, natural rubber                Past Medical History:   Diagnosis Date    Anxiety      Back pain      Depressive disorder, not elsewhere classified 11/25/2014    Perirectal fistula      Seizures              Past Surgical History:   Procedure Laterality Date    BACK SURGERY        CAUDAL EPIDURAL STEROID INJECTION N/A 10/10/2019     Procedure: Injection-steroid-epidural-caudal;  Surgeon: Amol Grady Jr., MD;  Location: [facility] OR;  Service: Pain Management;  Laterality: N/A;    COLONOSCOPY N/A 8/22/2016     Procedure: COLONOSCOPY;  Surgeon: TROY Inman MD;  Location: [facility] (4TH FLR);  Service: Endoscopy;  Laterality: N/A;    EXAMINATION UNDER ANESTHESIA N/A 10/12/2018     Procedure: Exam under anesthesia;  Surgeon: TROY Inman MD;  Location: [facility] 2ND FLR;  Service: Colon and Rectal;  Laterality: N/A;    fistula surgery        HAND SURGERY Left      INSERTION OF SETON STITCH N/A 10/12/2018     Procedure: PLACEMENT, SETON STITCH;  Surgeon: TROY Inman MD;  Location: [facility] OR 2ND FLR;  Service: Colon and Rectal;  Laterality: N/A;    LEG SURGERY         plate    LEG SURGERY Right        History reviewed. No pertinent family history.  Social History            Tobacco Use    Smoking status: Current Every Day Smoker       Packs/day: 1.50       Years: 30.00       Pack years: 45.00       Types: Cigarettes    Smokeless tobacco: Never Used   Substance Use Topics    Alcohol use: Yes       Comment: beer occasionally     Drug use: Yes       Types: Marijuana          Review of Systems   Constitutional: Negative.    Respiratory: Negative for cough and shortness of breath.    Cardiovascular: Negative for chest pain, palpitations, claudication and leg swelling.   Gastrointestinal: Negative for abdominal pain, constipation and diarrhea.   Genitourinary: Negative for flank pain, frequency and urgency.   Musculoskeletal: Positive for neck pain. Negative for back pain, falls, joint pain and myalgias.   Skin: Negative.    Neurological: Positive for tingling. Negative for dizziness, tremors, sensory change, speech change, focal weakness, seizures, loss of consciousness, weakness and headaches.   Psychiatric/Behavioral: Negative.          OBJECTIVE:   Vital Signs:  Pulse: 78 (03/09/20 1408)  Resp: 16 (03/09/20 1408)  BP: (!) 142/91 (03/09/20 1408)     Physical Exam:  Constitutional: Patient sitting comfortably in chair. Appears well developed and well nourished.  Skin: Exposed areas are intact without abnormal markings, rashes or other lesions.  HEENT: Normocephalic. Normal conjunctivae.  Cardiovascular: Normal rate and regular rhythm.  Respiratory: Chest wall rises and falls symmetrically, without signs of respiratory distress.  Abdomen: Soft and non-tender.  Extremities: Warm and without edema. Calves supple, non-tender.  Psych/Behavior: Normal affect.     Neurological:     Mental status: Alert and oriented. Conversational and appropriate.       Cranial Nerves: VFF to confrontation. PERRL. EOMI without nystagmus. Facial STLT normal and symmetric. Strong, symmetric muscles of mastication. Facial strength full and symmetric. Hearing equal bilaterally to finger rub. Palate and uvula rise and fall normally in midline. Shoulder shrug 5/5 strength. Tongue midline.      Motor:     Upper:   Deltoids Triceps Biceps WE WF  FA     R 5/5 5/5 5/5 5/5 5/5 5/5 5/5     L 5/5 5/5 5/5 5/5 5/5 5/5 5/5      Lower:   HF KE KF DF PF EHL     R 5/5 5/5 5/5 5/5 5/5 5/5     L 5/5 5/5 5/5 5/5 5/5 5/5       Sensory: Intact sensation to light touch and pinprick in all extremities.      Reflexes:      DTR: 2+ knees and biceps symmetrically.     Chris's: Negative.     Babinski's: Negative.     Clonus: Negative.     Cerebellar: Finger-to-nose and rapid alternating movements normal.      Gait:  Stable, fluid.     Spine:     Posture: Head well aligned over pelvis and shoulders in front and side views.  No focal or global spinal deformity visible on inspection.      Cervical:      ROM: Full with flexion, extension, lateral rotation and ear-to-shoulder bend.      Midline TTP: Negative.     Spurling's test: Negative.     Lhermitte's: Negative.     Thoracic:     Midline TTP: Negative     Lumbar:     Midline TTP: Negative     Straight Leg Test: Negative     Crossed Straight Leg Test: Negative     Other:     SI joint TTP: Negative.     Tenderness with external/internal hip rotation: Negative.     Diagnostic Results:  All imaging was independently reviewed by me.     Lumbar MRI, 9/12/20:  1. Diffuse DDD with straightening of lordosis  2. L2-3 mod L LRS 2/2 HNP, mild bilat NFS  3. L3-4 mild-mod CS, mod L NFS and mild-mod R NFS  4. L4-5 mild CS, mild-mod L NFS, mod R NFS  5. L5-S1 mild CS, mod R NFS, severe L NFS    MRI C spine, dated 11/29/19:  1. Moderate to moderate-severe CS at C3-4 to C6-7  2. Possible myelomalacia at C3-4     CT C spine, dated 11/14/19:  1. Diffuse degen changes        ASSESSMENT/PLAN:     Problem List Items Addressed This Visit     None          VISIT SUMMARY:  Patient returns to clinic today for FU of cervical stenosis and back pain. He has tried STEPHENIE with no relief. He has not tried PT. He would like to discuss surgical options with Dr. Amin as he feels his sxs are getting worse.     FU scheduled with Dr. Amin at his next available.       Nany Garces PA-C  [facility]  Department of Neurosurgery  [phone]

## 2020-09-03 ENCOUNTER — PATIENT OUTREACH (OUTPATIENT)
Dept: ADMINISTRATIVE | Facility: OTHER | Age: 50
End: 2020-09-03

## 2020-09-08 ENCOUNTER — PATIENT OUTREACH (OUTPATIENT)
Dept: ADMINISTRATIVE | Facility: HOSPITAL | Age: 50
End: 2020-09-08

## 2020-09-08 NOTE — PROGRESS NOTES
Health Maintenance Due   Topic Date Due    TETANUS VACCINE  06/11/1987    Pneumococcal Vaccine (Medium Risk) (1 of 1 - PPSV23) 06/11/1988    High Dose Statin  06/11/1990    Shingles Vaccine (1 of 2) 06/11/2019     Chart review completed.

## 2020-09-21 ENCOUNTER — OFFICE VISIT (OUTPATIENT)
Dept: NEUROSURGERY | Facility: CLINIC | Age: 50
End: 2020-09-21
Payer: MEDICARE

## 2020-09-21 VITALS
HEART RATE: 80 BPM | DIASTOLIC BLOOD PRESSURE: 87 MMHG | SYSTOLIC BLOOD PRESSURE: 160 MMHG | TEMPERATURE: 99 F | BODY MASS INDEX: 26.03 KG/M2 | HEIGHT: 74 IN | OXYGEN SATURATION: 100 % | WEIGHT: 202.81 LBS

## 2020-09-21 DIAGNOSIS — M47.812 CERVICAL SPONDYLOSIS WITHOUT MYELOPATHY: Primary | ICD-10-CM

## 2020-09-21 PROCEDURE — 99999 PR PBB SHADOW E&M-EST. PATIENT-LVL III: ICD-10-PCS | Mod: PBBFAC,,, | Performed by: NEUROLOGICAL SURGERY

## 2020-09-21 PROCEDURE — 3008F PR BODY MASS INDEX (BMI) DOCUMENTED: ICD-10-PCS | Mod: CPTII,S$GLB,, | Performed by: NEUROLOGICAL SURGERY

## 2020-09-21 PROCEDURE — 3008F BODY MASS INDEX DOCD: CPT | Mod: CPTII,S$GLB,, | Performed by: NEUROLOGICAL SURGERY

## 2020-09-21 PROCEDURE — 99214 PR OFFICE/OUTPT VISIT, EST, LEVL IV, 30-39 MIN: ICD-10-PCS | Mod: S$GLB,,, | Performed by: NEUROLOGICAL SURGERY

## 2020-09-21 PROCEDURE — 99999 PR PBB SHADOW E&M-EST. PATIENT-LVL III: CPT | Mod: PBBFAC,,, | Performed by: NEUROLOGICAL SURGERY

## 2020-09-21 PROCEDURE — 99214 OFFICE O/P EST MOD 30 MIN: CPT | Mod: S$GLB,,, | Performed by: NEUROLOGICAL SURGERY

## 2020-09-21 RX ORDER — IBUPROFEN 800 MG/1
800 TABLET ORAL EVERY 8 HOURS PRN
Qty: 90 TABLET | Refills: 0 | Status: SHIPPED | OUTPATIENT
Start: 2020-09-21 | End: 2021-03-30

## 2020-09-23 NOTE — PROGRESS NOTES
CHIEF COMPLAINT:        Chief Complaint   Patient presents with    Follow-up    Neck Pain         INTERVAL HISTORY (9/21/20):  F/u re: back and neck pain.    Underwent caudal STEPHENIE on 7/16/20 by Dr Grady and reports continued relief.  Main complaint is neck stiffness and grinding sensation with movement.  Denies any pain in arms.  Feels like hands are slightly weaker in that they cramp up at work as .  Some tingling in the fingers.  Noticed some difficulty opening bottles and unscrewing oil filters with left hand.  Dropping nuts and bolts at work more frequently.  No problems with walking or imbalance.      Refused PT.    States ibuprofen helps with arthritis pain.    INTERVAL HISTORY (6/7/20):  Patient returns for continued management of neck and back pain.  Patient has neck pain was the primary concern during his last visit however today he reports that it is better.  His main complaint today is low back pain and leg pain.  Pain is located over the left SI joint and wraps around the waist as well as radiates up to the ribcage.  He notes that he has pain in his testicles, hip joint and groin.  He also notes some change in temperature sensation in the left thigh that is intermittent.  He describes it as cold and tingling.  It comes on suddenly and will radiate up to his nipples.  This will cause him to have to stand up to help alleviate the sensation.  He also notes that this is accompanied by whole body goose bumps.  The pain an sensation change is made worse by sitting and better with standing.  He reports a recent incident of impotence.     Surgery - L5-S1 lami  Injections - caudual on 10/10/19 - no help  PT - previously for low back but believes he hurt his neck     HPI:  Anmol Angeles . is a 49 y.o.  male with below listed PMH, who is referred for evaluation of neck pain.    Reports pain, attention, and stiffness between his shoulder blades.  He reports decreased range of motion and tension in  his neck in all positions except for flexion.  He denies any numbness but reports tingling in the arm and around his neck.  He denies any weakness.  He states that he has to watch his posture and his neck movements to prevent triggering the pain.  He believes the neck pain started after an injury doing PT for his low back.       He has diffuse stenosis in the cervical spine.  He denies any gait disturbance and although he is occasionally clumsy he denies dropping items from his hands or having issues with fine motor skills such as buttoning shirts.  He denies any bowel or bladder issues.       He works various jobs as a , lawncare, and .     PT - none for neck, injured neck during lumbar PT  Injections - low back only          Review of patient's allergies indicates:   Allergen Reactions    Latex, natural rubber                   Past Medical History:   Diagnosis Date    Anxiety      Back pain      Depressive disorder, not elsewhere classified 11/25/2014    Perirectal fistula      Seizures                  Past Surgical History:   Procedure Laterality Date    BACK SURGERY        CAUDAL EPIDURAL STEROID INJECTION N/A 10/10/2019     Procedure: Injection-steroid-epidural-caudal;  Surgeon: Amol Grady Jr., MD;  Location: [facility];  Service: Pain Management;  Laterality: N/A;    COLONOSCOPY N/A 8/22/2016     Procedure: COLONOSCOPY;  Surgeon: TROY Inman MD;  Location: [facility] (4TH FLR);  Service: Endoscopy;  Laterality: N/A;    EXAMINATION UNDER ANESTHESIA N/A 10/12/2018     Procedure: Exam under anesthesia;  Surgeon: TROY Inman MD;  Location: [facility] OR 2ND FLR;  Service: Colon and Rectal;  Laterality: N/A;    fistula surgery        HAND SURGERY Left      INSERTION OF SETON STITCH N/A 10/12/2018     Procedure: PLACEMENT, SETON STITCH;  Surgeon: TROY Inman MD;  Location: [facility] OR 2ND FLR;  Service: Colon and Rectal;  Laterality: N/A;    LEG SURGERY         plate     LEG SURGERY Right        History reviewed. No pertinent family history.  Social History                Tobacco Use    Smoking status: Current Every Day Smoker       Packs/day: 1.50       Years: 30.00       Pack years: 45.00       Types: Cigarettes    Smokeless tobacco: Never Used   Substance Use Topics    Alcohol use: Yes       Comment: beer occasionally     Drug use: Yes       Types: Marijuana         Review of Systems   Constitutional: Negative.    Respiratory: Negative for cough and shortness of breath.    Cardiovascular: Negative for chest pain, palpitations, claudication and leg swelling.   Gastrointestinal: Negative for abdominal pain, constipation and diarrhea.   Genitourinary: Negative for flank pain, frequency and urgency.   Musculoskeletal: Positive for neck pain. Negative for back pain, falls, joint pain and myalgias.   Skin: Negative.    Neurological: Positive for tingling. Negative for dizziness, tremors, sensory change, speech change, focal weakness, seizures, loss of consciousness, weakness and headaches.   Psychiatric/Behavioral: Negative.          OBJECTIVE:     Vitals:    09/21/20 0904   BP: (!) 160/87   Pulse: 80   Temp: 98.8 °F (37.1 °C)       Physical Exam:  Constitutional: Patient sitting comfortably in chair. Appears well developed and well nourished.  Skin: Exposed areas are intact without abnormal markings, rashes or other lesions.  HEENT: Normocephalic. Normal conjunctivae.  Cardiovascular: Normal rate and regular rhythm.  Respiratory: Chest wall rises and falls symmetrically, without signs of respiratory distress.  Abdomen: Soft and non-tender.  Extremities: Warm and without edema. Calves supple, non-tender.  Psych/Behavior: Normal affect.     Neurological:     Mental status: Alert and oriented. Conversational and appropriate.       Cranial Nerves: VFF to confrontation. PERRL. EOMI without nystagmus. Facial STLT normal and symmetric. Strong, symmetric muscles of mastication. Facial  strength full and symmetric. Hearing equal bilaterally to finger rub. Palate and uvula rise and fall normally in midline. Shoulder shrug 5/5 strength. Tongue midline.      Motor:     Upper:   Deltoids Triceps Biceps WE WF  FA     R 5/5 5/5 5/5 5/5 5/5 5/5 5/5     L 5/5 5/5 5/5 5/5 5/5 5/5 5/5      Lower:   HF KE KF DF PF EHL     R 5/5 5/5 5/5 5/5 5/5 5/5     L 5/5 5/5 5/5 5/5 5/5 5/5      Sensory: Intact sensation to light touch and pinprick in all extremities.      Reflexes:      DTR: 2+ knees and biceps symmetrically.     Chris's: Negative.     Babinski's: Negative.     Clonus: Negative.     Cerebellar: Finger-to-nose and rapid alternating movements normal.      Gait:  Stable, fluid.     Spine:     Posture: Head well aligned over pelvis and shoulders in front and side views.  No focal or global spinal deformity visible on inspection.      Cervical:      ROM: Full with flexion, extension, lateral rotation and ear-to-shoulder bend.      Midline TTP: Negative.     Spurling's test: Negative.     Lhermitte's: Negative.     Thoracic:     Midline TTP: Negative     Lumbar:     Midline TTP: Negative     Straight Leg Test: Negative     Crossed Straight Leg Test: Negative     Other:     SI joint TTP: Negative.     Tenderness with external/internal hip rotation: Negative.     Diagnostic Results:  All imaging was independently reviewed by me.     Cervical MRI, 11/29/19  1. Moderate CS C3-4 with myelomalacia  2. Moderate severe LRS at C4-5  3, Bilateral NFS at C5-6  4. Mild-moderate CS at C6-7    Lumbar MRI, 9/12/20:  1. Diffuse DDD with straightening of lordosis  2. L2-3 mod L LRS 2/2 HNP, mild bilat NFS  3. L3-4 mild-mod CS, mod L NFS and mild-mod R NFS  4. L4-5 mild CS, mild-mod L NFS, mod R NFS  5. L5-S1 mild CS, mod R NFS, severe L NFS     MRI C spine, dated 11/29/19:  1. Moderate to moderate-severe CS at C3-4 to C6-7  2. Possible myelomalacia at C3-4     CT C spine, dated 11/14/19:  1. Diffuse degen  changes        ASSESSMENT/PLAN:     Problem List Items Addressed This Visit        Neuro    Cervical spondylosis without myelopathy - Primary        VISIT SUMMARY:  Main complaint today is neck stiffness and some mild cramping in his hands when working as .  He reports some clumsiness in hands but no gait disturbance.  He has varying degrees of cervical stenosis and possible early signs of myelopathy.  He rides a motorcycle and work as  requiring him to work in awkward positions, which could increase his risk of worsening myelopathy.  However, he prefers not to have any surgery as it will interfere with his ability to ride motorcycle and perform his work.  We will continue to monitor and if sxs worsen then may need surgery.  Should explore cervical injections for neck pain.     PATIENT EDUCATION:  More than half the clinic visit was spent showing with patient the pertinent findings on imaging and educating the patient about natural history of the pathology.   We discussed options for treatment as well as the risks and benefits of each option.  All questions were answered.      The patient understands and agrees with the following plan of care.     - F/u Dr Grady re: cervical spine injections  - Declined PT  - RTC as needed  - Refilled ibuprofen 800

## 2020-10-14 ENCOUNTER — LAB VISIT (OUTPATIENT)
Dept: LAB | Facility: HOSPITAL | Age: 50
End: 2020-10-14
Payer: MEDICARE

## 2020-10-14 ENCOUNTER — OFFICE VISIT (OUTPATIENT)
Dept: INTERNAL MEDICINE | Facility: CLINIC | Age: 50
End: 2020-10-14
Payer: MEDICARE

## 2020-10-14 VITALS
WEIGHT: 195.31 LBS | SYSTOLIC BLOOD PRESSURE: 122 MMHG | HEIGHT: 74 IN | HEART RATE: 82 BPM | DIASTOLIC BLOOD PRESSURE: 76 MMHG | BODY MASS INDEX: 25.07 KG/M2 | OXYGEN SATURATION: 99 %

## 2020-10-14 DIAGNOSIS — L80 VITILIGO: ICD-10-CM

## 2020-10-14 DIAGNOSIS — Z13.6 SCREENING FOR HEART DISEASE: ICD-10-CM

## 2020-10-14 DIAGNOSIS — G40.909 SEIZURE DISORDER: ICD-10-CM

## 2020-10-14 DIAGNOSIS — R21 RASH: ICD-10-CM

## 2020-10-14 DIAGNOSIS — L29.9 ITCHING: ICD-10-CM

## 2020-10-14 DIAGNOSIS — Z00.00 ANNUAL PHYSICAL EXAM: Primary | ICD-10-CM

## 2020-10-14 DIAGNOSIS — L43.9 LICHEN PLANUS: ICD-10-CM

## 2020-10-14 DIAGNOSIS — Z00.00 ANNUAL PHYSICAL EXAM: ICD-10-CM

## 2020-10-14 LAB
ALBUMIN SERPL BCP-MCNC: 4.1 G/DL (ref 3.5–5.2)
ALP SERPL-CCNC: 81 U/L (ref 55–135)
ALT SERPL W/O P-5'-P-CCNC: 32 U/L (ref 10–44)
ANION GAP SERPL CALC-SCNC: 11 MMOL/L (ref 8–16)
AST SERPL-CCNC: 23 U/L (ref 10–40)
BASOPHILS # BLD AUTO: 0.05 K/UL (ref 0–0.2)
BASOPHILS NFR BLD: 0.5 % (ref 0–1.9)
BILIRUB SERPL-MCNC: 0.5 MG/DL (ref 0.1–1)
BUN SERPL-MCNC: 22 MG/DL (ref 6–20)
CALCIUM SERPL-MCNC: 9.4 MG/DL (ref 8.7–10.5)
CHLORIDE SERPL-SCNC: 102 MMOL/L (ref 95–110)
CHOLEST SERPL-MCNC: 248 MG/DL (ref 120–199)
CHOLEST/HDLC SERPL: 6.4 {RATIO} (ref 2–5)
CO2 SERPL-SCNC: 28 MMOL/L (ref 23–29)
CREAT SERPL-MCNC: 0.8 MG/DL (ref 0.5–1.4)
CRP SERPL-MCNC: 4.3 MG/L (ref 0–8.2)
DIFFERENTIAL METHOD: ABNORMAL
EOSINOPHIL # BLD AUTO: 0.3 K/UL (ref 0–0.5)
EOSINOPHIL NFR BLD: 2.4 % (ref 0–8)
ERYTHROCYTE [DISTWIDTH] IN BLOOD BY AUTOMATED COUNT: 12.7 % (ref 11.5–14.5)
ERYTHROCYTE [SEDIMENTATION RATE] IN BLOOD BY WESTERGREN METHOD: 8 MM/HR (ref 0–23)
EST. GFR  (AFRICAN AMERICAN): >60 ML/MIN/1.73 M^2
EST. GFR  (NON AFRICAN AMERICAN): >60 ML/MIN/1.73 M^2
GLUCOSE SERPL-MCNC: 78 MG/DL (ref 70–110)
HCT VFR BLD AUTO: 48.2 % (ref 40–54)
HDLC SERPL-MCNC: 39 MG/DL (ref 40–75)
HDLC SERPL: 15.7 % (ref 20–50)
HGB BLD-MCNC: 15.9 G/DL (ref 14–18)
IMM GRANULOCYTES # BLD AUTO: 0.03 K/UL (ref 0–0.04)
IMM GRANULOCYTES NFR BLD AUTO: 0.3 % (ref 0–0.5)
LDLC SERPL CALC-MCNC: 158.8 MG/DL (ref 63–159)
LYMPHOCYTES # BLD AUTO: 2.7 K/UL (ref 1–4.8)
LYMPHOCYTES NFR BLD: 25.7 % (ref 18–48)
MCH RBC QN AUTO: 31.3 PG (ref 27–31)
MCHC RBC AUTO-ENTMCNC: 33 G/DL (ref 32–36)
MCV RBC AUTO: 95 FL (ref 82–98)
MONOCYTES # BLD AUTO: 0.8 K/UL (ref 0.3–1)
MONOCYTES NFR BLD: 8 % (ref 4–15)
NEUTROPHILS # BLD AUTO: 6.7 K/UL (ref 1.8–7.7)
NEUTROPHILS NFR BLD: 63.1 % (ref 38–73)
NONHDLC SERPL-MCNC: 209 MG/DL
NRBC BLD-RTO: 0 /100 WBC
PLATELET # BLD AUTO: 227 K/UL (ref 150–350)
PMV BLD AUTO: 10.5 FL (ref 9.2–12.9)
POTASSIUM SERPL-SCNC: 4.8 MMOL/L (ref 3.5–5.1)
PROT SERPL-MCNC: 7.6 G/DL (ref 6–8.4)
RBC # BLD AUTO: 5.08 M/UL (ref 4.6–6.2)
SODIUM SERPL-SCNC: 141 MMOL/L (ref 136–145)
TRIGL SERPL-MCNC: 251 MG/DL (ref 30–150)
TSH SERPL DL<=0.005 MIU/L-ACNC: 0.65 UIU/ML (ref 0.4–4)
WBC # BLD AUTO: 10.56 K/UL (ref 3.9–12.7)

## 2020-10-14 PROCEDURE — 86039 ANTINUCLEAR ANTIBODIES (ANA): CPT

## 2020-10-14 PROCEDURE — 86140 C-REACTIVE PROTEIN: CPT

## 2020-10-14 PROCEDURE — 80053 COMPREHEN METABOLIC PANEL: CPT

## 2020-10-14 PROCEDURE — 99999 PR PBB SHADOW E&M-EST. PATIENT-LVL IV: ICD-10-PCS | Mod: PBBFAC,,, | Performed by: NURSE PRACTITIONER

## 2020-10-14 PROCEDURE — 85025 COMPLETE CBC W/AUTO DIFF WBC: CPT

## 2020-10-14 PROCEDURE — 80061 LIPID PANEL: CPT

## 2020-10-14 PROCEDURE — 86038 ANTINUCLEAR ANTIBODIES: CPT

## 2020-10-14 PROCEDURE — 3008F BODY MASS INDEX DOCD: CPT | Mod: CPTII,S$GLB,, | Performed by: NURSE PRACTITIONER

## 2020-10-14 PROCEDURE — 84443 ASSAY THYROID STIM HORMONE: CPT

## 2020-10-14 PROCEDURE — 86235 NUCLEAR ANTIGEN ANTIBODY: CPT | Mod: 59

## 2020-10-14 PROCEDURE — 99396 PR PREVENTIVE VISIT,EST,40-64: ICD-10-PCS | Mod: S$GLB,,, | Performed by: NURSE PRACTITIONER

## 2020-10-14 PROCEDURE — 85652 RBC SED RATE AUTOMATED: CPT

## 2020-10-14 PROCEDURE — 99396 PREV VISIT EST AGE 40-64: CPT | Mod: S$GLB,,, | Performed by: NURSE PRACTITIONER

## 2020-10-14 PROCEDURE — 36415 COLL VENOUS BLD VENIPUNCTURE: CPT

## 2020-10-14 PROCEDURE — 3008F PR BODY MASS INDEX (BMI) DOCUMENTED: ICD-10-PCS | Mod: CPTII,S$GLB,, | Performed by: NURSE PRACTITIONER

## 2020-10-14 PROCEDURE — 99999 PR PBB SHADOW E&M-EST. PATIENT-LVL IV: CPT | Mod: PBBFAC,,, | Performed by: NURSE PRACTITIONER

## 2020-10-14 RX ORDER — CLOBETASOL PROPIONATE 0.5 MG/G
OINTMENT TOPICAL 2 TIMES DAILY
Qty: 60 G | Refills: 0 | Status: SHIPPED | OUTPATIENT
Start: 2020-10-14 | End: 2022-02-03

## 2020-10-14 RX ORDER — METHYLPREDNISOLONE 4 MG/1
TABLET ORAL
Qty: 21 TABLET | Refills: 0 | Status: SHIPPED | OUTPATIENT
Start: 2020-10-14 | End: 2020-11-04

## 2020-10-14 NOTE — PROGRESS NOTES
"Internal Medicine Annual Exam       CHIEF COMPLAINT     The patient, Anmol Angeles Jr., who is a 50 y.o. 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    HPI   He is an established pt of Dr Rhodes. Seen by Dr. Rhodes 10/2019     Chronic neck and back pain- followed by Neurosurgery   Surgery - L5-S1 lami  Injections - caudual on 10/10/19 - no help  PT - previously for low back but believes he hurt his neck  Cervical MRI, 11/29/19  1. Moderate CS C3-4 with myelomalacia  2. Moderate severe LRS at C4-5  3, Bilateral NFS at C5-6  4. Mild-moderate CS at C6-7     Lumbar MRI, 9/12/20:  1. Diffuse DDD with straightening of lordosis  2. L2-3 mod L LRS 2/2 HNP, mild bilat NFS  3. L3-4 mild-mod CS, mod L NFS and mild-mod R NFS  4. L4-5 mild CS, mild-mod L NFS, mod R NFS  5. L5-S1 mild CS, mod R NFS, severe L NFS     MRI C spine, dated 11/29/19:  1. Moderate to moderate-severe CS at C3-4 to C6-7  2. Possible myelomalacia at C3-4    CT C spine, dated 11/14/19:  1. Diffuse degen changes    Today he c/o of skin issues. Initial "outbreak" of blisters to the arms with cyclically flaking, blistering and dry skin [id number]. Treated by dermatology. Resolved.     Returned to the bilateral hands 2012 - treated with steroid cream. Hypopigmentation to the wrists and bilateral lips. Colored returned to bottom lip but otherwise hypopigmentation persisted.     3 months ago started with (2 " "weeks after epidural) "whelps" to the upper chest, neck, arms, and torso. Continues to the blisters to webspace.   Concerned it's his nerves   Has a lot of stress regarding finances, pandemic, mask wearing, etc.       Immunizations:   Influenza, TDap, and Pneumovax all rec but declined by pt, Risks and benefits were discussed with patient.  Shingrix rec once back in stock     Cancer Screening:  Colonoscopy:  8/2016 c polyp in ascending colon removed, hemorrhoids, perianal fistula; path c adenomatous polyp c rec f/u in 5 yrs.    Past Medical History:  Past Medical History:   Diagnosis Date    Anxiety     Back pain     Chronic back pain     sylvester commented it has been "years" and is related to the "type of hard work" he has done all his life and that it is an ongoing pain he deals with daily     Colon polyp     Depressive disorder, not elsewhere classified 11/25/2014    Perirectal fistula     Seizures     "about 7 years ago, had 3 episodes and has not had a seizure in over 2 years" patient spoke in length related to not having a seizure disorder and felt unheard by many doctors when he described his events 7 years ago to his doctors       Past Surgical History:   Procedure Laterality Date    BACK SURGERY      2003/2004    CAUDAL EPIDURAL STEROID INJECTION N/A 10/10/2019    Procedure: Injection-steroid-epidural-caudal;  Surgeon: Amol Grady Jr., MD;  Location: [facility] OR;  Service: Pain Management;  Laterality: N/A;    CAUDAL EPIDURAL STEROID INJECTION N/A 7/16/2020    Procedure: Injection-steroid-epidural-caudal;  Surgeon: Amol Grady Jr., MD;  Location: [facility] OR;  Service: Pain Management;  Laterality: N/A;    COLONOSCOPY N/A 8/22/2016    Procedure: COLONOSCOPY;  Surgeon: TROY Inman MD;  Location: [facility] ([address] [place] [address]);  Service: Endoscopy;  Laterality: N/A;    EXAMINATION UNDER ANESTHESIA N/A 10/12/2018    Procedure: Exam under anesthesia;  Surgeon: TROY Inman MD;  Location: [facility]" OR 2ND FLR;  Service: Colon and Rectal;  Laterality: N/A;    fistula surgery      perirectal    HAND SURGERY Left     INSERTION OF SETON STITCH N/A 10/12/2018    Procedure: PLACEMENT, SETON STITCH;  Surgeon: TROY Inman MD;  [facility] 2ND FLR;  Service: Colon and Rectal;  Laterality: N/A;    LEG SURGERY      plate    LEG SURGERY Right 1993        History reviewed. No pertinent family history.     Social History     Socioeconomic History    Marital status: Single     Spouse name: Not on file    Number of children: Not on file    Years of education: Not on file    Highest education level: Not on file   Occupational History    Occupation: Maintenance with [employer]     Employer: [employer] [place]    Social Needs    Financial resource strain: Not on file    Food insecurity     Worry: Not on file     Inability: Not on file    Transportation needs     Medical: Not on file     Non-medical: Not on file   Tobacco Use    Smoking status: Current Every Day Smoker     Packs/day: 1.50     Years: 30.00     Pack years: 45.00     Types: Cigarettes    Smokeless tobacco: Never Used   Substance and Sexual Activity    Alcohol use: Yes     Comment: beer occasionally     Drug use: Yes     Types: Marijuana    Sexual activity: Yes   Lifestyle    Physical activity     Days per week: Not on file     Minutes per session: Not on file    Stress: Not on file   Relationships    Social connections     Talks on phone: Not on file     Gets together: Not on file     Attends Hinduism service: Not on file     Active member of club or organization: Not on file     Attends meetings of clubs or organizations: Not on file     Relationship status: Not on file   Other Topics Concern    Not on file   Social History Narrative    Not on file        Social History     Tobacco Use   Smoking Status Current Every Day Smoker    Packs/day: 1.50    Years: 30.00    Pack years: 45.00    Types: Cigarettes   Smokeless Tobacco  Never Used        Allergies as of 10/14/2020 - Reviewed 10/14/2020   Allergen Reaction Noted    Latex, natural rubber  09/30/2014          Home Medications:  Prior to Admission medications    Medication Sig Start Date End Date Taking? Authorizing Provider   ibuprofen (ADVIL,MOTRIN) 800 MG tablet Take 1 tablet (800 mg total) by mouth every 8 (eight) hours as needed for Pain.  Patient not taking: Reported on 10/14/2020 9/21/20   Ian Amin DO   meclizine (ANTIVERT) 12.5 mg tablet Take 1 tablet (12.5 mg total) by mouth 3 (three) times daily as needed for Dizziness.  Patient not taking: Reported on 10/14/2020 11/15/19   Fawn Renee MD   meloxicam (MOBIC) 7.5 MG tablet Take 1 tablet (7.5 mg total) by mouth once daily.  Patient not taking: Reported on 10/14/2020 6/1/20   Ian Amin DO   triamcinolone acetonide 0.1% (KENALOG) 0.1 % cream Apply topically 2 (two) times daily.  Patient not taking: Reported on 10/14/2020 8/27/19   Erfain Baeza MD       Review of Systems:  Review of Systems   Constitutional: Positive for fatigue. Negative for chills and fever.   HENT: Negative for congestion and postnasal drip.    Respiratory: Negative for cough, shortness of breath and wheezing.    Cardiovascular: Negative for chest pain and palpitations.   Gastrointestinal: Negative for abdominal pain.   Musculoskeletal: Positive for myalgias. Negative for arthralgias.   Skin: Positive for rash.   Psychiatric/Behavioral: Positive for agitation.       Health Maintainence:   Immunizations:  Health Maintenance       Date Due Completion Date    TETANUS VACCINE 06/11/1987 ---    Pneumococcal Vaccine (Medium Risk) (1 of 1 - PPSV23) 06/11/1988 ---    High Dose Statin 06/11/1990 ---    Shingles Vaccine (1 of 2) 06/11/2019 ---    Colorectal Cancer Screening 08/22/2021 8/22/2016    Lipid Panel 12/18/2023 12/18/2018           PHYSICAL EXAM     /76 (BP Location: Right arm, Patient Position: Sitting, BP Method: Large (Manual))    "Pulse 82   Ht 6' 2" (1.88 m)   Wt 88.6 kg (195 lb 5.2 oz)   SpO2 99%   BMI 25.08 kg/m²  Body mass index is 25.08 kg/m².    Physical Exam  Vitals signs reviewed.   Constitutional:       Appearance: He is well-developed.   HENT:      Head: Normocephalic and atraumatic.   Eyes:      Pupils: Pupils are equal, round, and reactive to light.   Cardiovascular:      Rate and Rhythm: Normal rate and regular rhythm.   Pulmonary:      Effort: Pulmonary effort is normal.   Skin:     General: Skin is warm and dry.      Findings: Rash present. Rash is scaling.          Neurological:      Mental Status: He is alert and oriented to person, place, and time.         LABS     No results found for: LABA1C, HGBA1C  CMP  Sodium   Date Value Ref Range Status   11/14/2019 135 (L) 136 - 145 mmol/L Final     Potassium   Date Value Ref Range Status   11/14/2019 4.0 3.5 - 5.1 mmol/L Final     Chloride   Date Value Ref Range Status   11/14/2019 100 (L) 101 - 111 mmol/L Final     CO2   Date Value Ref Range Status   11/14/2019 24 23 - 29 mmol/L Final     Glucose   Date Value Ref Range Status   11/14/2019 82 74 - 118 mg/dL Final     BUN, Bld   Date Value Ref Range Status   11/14/2019 14 6 - 20 mg/dL Final     Creatinine   Date Value Ref Range Status   11/14/2019 0.8 0.5 - 1.4 mg/dL Final     Calcium   Date Value Ref Range Status   11/14/2019 9.6 8.6 - 10.0 mg/dL Final     Total Protein   Date Value Ref Range Status   11/14/2019 7.8 6.0 - 8.4 g/dL Final     Albumin   Date Value Ref Range Status   11/14/2019 4.4 3.5 - 5.2 g/dL Final     Total Bilirubin   Date Value Ref Range Status   11/14/2019 0.9 0.3 - 1.2 mg/dL Final     Comment:     For infants and newborns, interpretation of results should be based  on gestational age, weight and in agreement with clinical  observations.  Premature Infant recommended reference ranges:  Up to 24 hours.............<8.0 mg/dL  Up to 48 hours............<12.0 mg/dL  3-5 days..................<15.0 mg/dL  6-29 " days.................<15.0 mg/dL       Alkaline Phosphatase   Date Value Ref Range Status   11/14/2019 106 38 - 126 U/L Final     AST   Date Value Ref Range Status   11/14/2019 51 (H) 15 - 41 U/L Final     ALT   Date Value Ref Range Status   11/14/2019 78 (H) 17 - 63 U/L Final     Anion Gap   Date Value Ref Range Status   11/14/2019 11 8 - 16 mmol/L Final     eGFR if    Date Value Ref Range Status   11/14/2019 >60.0 >60 mL/min/1.73 m^2 Final     eGFR if non    Date Value Ref Range Status   11/14/2019 >60.0 >60 mL/min/1.73 m^2 Final     Comment:     Calculation used to obtain the estimated glomerular filtration  rate (eGFR) is the CKD-EPI equation.        Lab Results   Component Value Date    WBC 10.40 11/14/2019    HGB 17.3 11/14/2019    HCT 51.4 11/14/2019    MCV 94 11/14/2019     11/14/2019     Lab Results   Component Value Date    CHOL 246 (H) 12/18/2018     Lab Results   Component Value Date    HDL 44 12/18/2018     Lab Results   Component Value Date    LDLCALC 171.2 (H) 12/18/2018     Lab Results   Component Value Date    TRIG 154 (H) 12/18/2018     Lab Results   Component Value Date    CHOLHDL 17.9 (L) 12/18/2018     Lab Results   Component Value Date    TSH 0.457 12/18/2018       ASSESSMENT/PLAN     Anmol Angeles Jr. is a 50 y.o. male    Annual physical exam  -     CBC auto differential; Future; Expected date: 10/14/2020  -     Comprehensive Metabolic Panel; Future; Expected date: 10/14/2020  -     Lipid Panel; Future; Expected date: 10/14/2020  -     TSH; Future; Expected date: 10/14/2020    Lichen planus- will check labs and start medrol dose pack and clobetasol. If not improved will refer to derm    -     methylPREDNISolone (MEDROL DOSEPACK) 4 mg tablet; use as directed  Dispense: 21 tablet; Refill: 0  -     clobetasol 0.05% (TEMOVATE) 0.05 % Oint; Apply topically 2 (two) times daily.  Dispense: 60 g; Refill: 0  -     LAWRENCE Screen w/Reflex; Future; Expected date:  10/14/2020  -     Sedimentation rate; Future; Expected date: 10/14/2020  -     C-Reactive Protein; Future; Expected date: 10/14/2020    Rash  -     LAWRENCE Screen w/Reflex; Future; Expected date: 10/14/2020  -     Sedimentation rate; Future; Expected date: 10/14/2020  -     C-Reactive Protein; Future; Expected date: 10/14/2020    Vitiligo  -     CBC auto differential; Future; Expected date: 10/14/2020  -     Comprehensive Metabolic Panel; Future; Expected date: 10/14/2020    Itching  -     TSH; Future; Expected date: 10/14/2020  -     LAWRENCE Screen w/Reflex; Future; Expected date: 10/14/2020  -     Sedimentation rate; Future; Expected date: 10/14/2020  -     C-Reactive Protein; Future; Expected date: 10/14/2020    Seizure disorder  -     CBC auto differential; Future; Expected date: 10/14/2020  -     Comprehensive Metabolic Panel; Future; Expected date: 10/14/2020    Screening for heart disease  -     Lipid Panel; Future; Expected date: 10/14/2020    Follow up with PCP     Isadora YUNG, APRN, FNP-c   Department of Internal Medicine - [facility]  3:37 PM

## 2020-10-15 LAB
ANA PATTERN 1: NORMAL
ANA SER QL IF: POSITIVE
ANA TITR SER IF: NORMAL {TITER}

## 2020-10-16 LAB
ANTI SM ANTIBODY: 0.07 RATIO (ref 0–0.99)
ANTI SM/RNP ANTIBODY: 0.14 RATIO (ref 0–0.99)
ANTI-SM INTERPRETATION: NEGATIVE
ANTI-SM/RNP INTERPRETATION: NEGATIVE
ANTI-SSA ANTIBODY: 0.05 RATIO (ref 0–0.99)
ANTI-SSA INTERPRETATION: NEGATIVE
ANTI-SSB ANTIBODY: 0.06 RATIO (ref 0–0.99)
ANTI-SSB INTERPRETATION: NEGATIVE
DSDNA AB SER-ACNC: NORMAL [IU]/ML

## 2020-10-20 ENCOUNTER — PATIENT MESSAGE (OUTPATIENT)
Dept: INTERNAL MEDICINE | Facility: CLINIC | Age: 50
End: 2020-10-20

## 2021-02-22 ENCOUNTER — TELEPHONE (OUTPATIENT)
Dept: PAIN MEDICINE | Facility: CLINIC | Age: 51
End: 2021-02-22

## 2021-02-25 ENCOUNTER — OFFICE VISIT (OUTPATIENT)
Dept: PAIN MEDICINE | Facility: CLINIC | Age: 51
End: 2021-02-25
Payer: MEDICARE

## 2021-02-25 VITALS
HEIGHT: 74 IN | TEMPERATURE: 98 F | BODY MASS INDEX: 26.33 KG/M2 | DIASTOLIC BLOOD PRESSURE: 78 MMHG | SYSTOLIC BLOOD PRESSURE: 142 MMHG | WEIGHT: 205.13 LBS | RESPIRATION RATE: 16 BRPM | HEART RATE: 90 BPM

## 2021-02-25 DIAGNOSIS — R42 VERTIGO: ICD-10-CM

## 2021-02-25 DIAGNOSIS — M47.812 CERVICAL SPONDYLOSIS: ICD-10-CM

## 2021-02-25 DIAGNOSIS — M51.36 DDD (DEGENERATIVE DISC DISEASE), LUMBAR: ICD-10-CM

## 2021-02-25 DIAGNOSIS — M50.30 DDD (DEGENERATIVE DISC DISEASE), CERVICAL: Primary | ICD-10-CM

## 2021-02-25 DIAGNOSIS — M47.816 LUMBAR SPONDYLOSIS: ICD-10-CM

## 2021-02-25 PROCEDURE — 99213 PR OFFICE/OUTPT VISIT, EST, LEVL III, 20-29 MIN: ICD-10-PCS | Mod: S$GLB,,, | Performed by: PAIN MEDICINE

## 2021-02-25 PROCEDURE — 99999 PR PBB SHADOW E&M-EST. PATIENT-LVL IV: ICD-10-PCS | Mod: PBBFAC,,, | Performed by: PAIN MEDICINE

## 2021-02-25 PROCEDURE — 1125F AMNT PAIN NOTED PAIN PRSNT: CPT | Mod: S$GLB,,, | Performed by: PAIN MEDICINE

## 2021-02-25 PROCEDURE — 3008F BODY MASS INDEX DOCD: CPT | Mod: CPTII,S$GLB,, | Performed by: PAIN MEDICINE

## 2021-02-25 PROCEDURE — 1125F PR PAIN SEVERITY QUANTIFIED, PAIN PRESENT: ICD-10-PCS | Mod: S$GLB,,, | Performed by: PAIN MEDICINE

## 2021-02-25 PROCEDURE — 3008F PR BODY MASS INDEX (BMI) DOCUMENTED: ICD-10-PCS | Mod: CPTII,S$GLB,, | Performed by: PAIN MEDICINE

## 2021-02-25 PROCEDURE — 99999 PR PBB SHADOW E&M-EST. PATIENT-LVL IV: CPT | Mod: PBBFAC,,, | Performed by: PAIN MEDICINE

## 2021-02-25 PROCEDURE — 99213 OFFICE O/P EST LOW 20 MIN: CPT | Mod: S$GLB,,, | Performed by: PAIN MEDICINE

## 2021-03-02 ENCOUNTER — TELEPHONE (OUTPATIENT)
Dept: NEUROLOGY | Facility: CLINIC | Age: 51
End: 2021-03-02

## 2021-03-18 PROBLEM — M54.12 CERVICAL RADICULOPATHY: Status: ACTIVE | Noted: 2021-03-18

## 2021-03-30 ENCOUNTER — OFFICE VISIT (OUTPATIENT)
Dept: PAIN MEDICINE | Facility: CLINIC | Age: 51
End: 2021-03-30
Payer: MEDICARE

## 2021-03-30 VITALS
HEART RATE: 88 BPM | SYSTOLIC BLOOD PRESSURE: 137 MMHG | BODY MASS INDEX: 26.45 KG/M2 | WEIGHT: 206.13 LBS | HEIGHT: 74 IN | DIASTOLIC BLOOD PRESSURE: 85 MMHG

## 2021-03-30 DIAGNOSIS — M50.30 DDD (DEGENERATIVE DISC DISEASE), CERVICAL: ICD-10-CM

## 2021-03-30 DIAGNOSIS — M54.12 CERVICAL RADICULOPATHY: Primary | ICD-10-CM

## 2021-03-30 DIAGNOSIS — M54.16 LUMBAR RADICULOPATHY: ICD-10-CM

## 2021-03-30 DIAGNOSIS — M47.816 LUMBAR SPONDYLOSIS: ICD-10-CM

## 2021-03-30 DIAGNOSIS — M51.36 DDD (DEGENERATIVE DISC DISEASE), LUMBAR: ICD-10-CM

## 2021-03-30 DIAGNOSIS — M47.812 CERVICAL SPONDYLOSIS: ICD-10-CM

## 2021-03-30 PROCEDURE — 99214 OFFICE O/P EST MOD 30 MIN: CPT | Mod: S$GLB,,, | Performed by: PAIN MEDICINE

## 2021-03-30 PROCEDURE — 99999 PR PBB SHADOW E&M-EST. PATIENT-LVL III: ICD-10-PCS | Mod: PBBFAC,,, | Performed by: PAIN MEDICINE

## 2021-03-30 PROCEDURE — 3008F BODY MASS INDEX DOCD: CPT | Mod: CPTII,S$GLB,, | Performed by: PAIN MEDICINE

## 2021-03-30 PROCEDURE — 99999 PR PBB SHADOW E&M-EST. PATIENT-LVL III: CPT | Mod: PBBFAC,,, | Performed by: PAIN MEDICINE

## 2021-03-30 PROCEDURE — 1125F AMNT PAIN NOTED PAIN PRSNT: CPT | Mod: S$GLB,,, | Performed by: PAIN MEDICINE

## 2021-03-30 PROCEDURE — 3008F PR BODY MASS INDEX (BMI) DOCUMENTED: ICD-10-PCS | Mod: CPTII,S$GLB,, | Performed by: PAIN MEDICINE

## 2021-03-30 PROCEDURE — 1125F PR PAIN SEVERITY QUANTIFIED, PAIN PRESENT: ICD-10-PCS | Mod: S$GLB,,, | Performed by: PAIN MEDICINE

## 2021-03-30 PROCEDURE — 99214 PR OFFICE/OUTPT VISIT, EST, LEVL IV, 30-39 MIN: ICD-10-PCS | Mod: S$GLB,,, | Performed by: PAIN MEDICINE

## 2021-03-30 RX ORDER — IBUPROFEN 800 MG/1
800 TABLET ORAL 3 TIMES DAILY
Qty: 90 TABLET | Refills: 2 | Status: SHIPPED | OUTPATIENT
Start: 2021-03-30 | End: 2021-06-28

## 2021-06-08 ENCOUNTER — PATIENT OUTREACH (OUTPATIENT)
Dept: ADMINISTRATIVE | Facility: OTHER | Age: 51
End: 2021-06-08

## 2021-06-14 ENCOUNTER — OFFICE VISIT (OUTPATIENT)
Dept: NEUROLOGY | Facility: CLINIC | Age: 51
End: 2021-06-14
Payer: MEDICARE

## 2021-06-14 VITALS — BODY MASS INDEX: 25.77 KG/M2 | WEIGHT: 200.75 LBS

## 2021-06-14 DIAGNOSIS — H93.13 TINNITUS OF BOTH EARS: ICD-10-CM

## 2021-06-14 DIAGNOSIS — M54.12 CERVICAL RADICULOPATHY: ICD-10-CM

## 2021-06-14 DIAGNOSIS — M48.02 CERVICAL SPINAL STENOSIS: ICD-10-CM

## 2021-06-14 DIAGNOSIS — R42 VERTIGO: ICD-10-CM

## 2021-06-14 DIAGNOSIS — R42 DIZZINESS AFTER EXTENSION OF NECK: Primary | ICD-10-CM

## 2021-06-14 DIAGNOSIS — M47.812 CERVICAL SPONDYLOSIS: ICD-10-CM

## 2021-06-14 PROCEDURE — 99205 OFFICE O/P NEW HI 60 MIN: CPT | Mod: GC,S$GLB,, | Performed by: STUDENT IN AN ORGANIZED HEALTH CARE EDUCATION/TRAINING PROGRAM

## 2021-06-14 PROCEDURE — 99999 PR PBB SHADOW E&M-EST. PATIENT-LVL III: ICD-10-PCS | Mod: PBBFAC,GC,, | Performed by: STUDENT IN AN ORGANIZED HEALTH CARE EDUCATION/TRAINING PROGRAM

## 2021-06-14 PROCEDURE — 99205 PR OFFICE/OUTPT VISIT, NEW, LEVL V, 60-74 MIN: ICD-10-PCS | Mod: GC,S$GLB,, | Performed by: STUDENT IN AN ORGANIZED HEALTH CARE EDUCATION/TRAINING PROGRAM

## 2021-06-14 PROCEDURE — 99999 PR PBB SHADOW E&M-EST. PATIENT-LVL III: CPT | Mod: PBBFAC,GC,, | Performed by: STUDENT IN AN ORGANIZED HEALTH CARE EDUCATION/TRAINING PROGRAM

## 2021-06-22 ENCOUNTER — HOSPITAL ENCOUNTER (OUTPATIENT)
Dept: RADIOLOGY | Facility: HOSPITAL | Age: 51
Discharge: HOME | End: 2021-06-22
Attending: STUDENT IN AN ORGANIZED HEALTH CARE EDUCATION/TRAINING PROGRAM
Payer: MEDICARE

## 2021-06-22 DIAGNOSIS — R42 DIZZINESS AFTER EXTENSION OF NECK: ICD-10-CM

## 2021-06-22 PROCEDURE — 70498 CTA NECK: ICD-10-PCS | Mod: 26,,, | Performed by: RADIOLOGY

## 2021-06-22 PROCEDURE — 70498 CT ANGIOGRAPHY NECK: CPT | Mod: TC

## 2021-06-22 PROCEDURE — 25500020 PHARM REV CODE 255: Performed by: STUDENT IN AN ORGANIZED HEALTH CARE EDUCATION/TRAINING PROGRAM

## 2021-06-22 PROCEDURE — 70498 CT ANGIOGRAPHY NECK: CPT | Mod: 26,,, | Performed by: RADIOLOGY

## 2021-06-22 RX ADMIN — IOHEXOL 100 ML: 350 INJECTION, SOLUTION INTRAVENOUS at 08:06

## 2021-09-15 ENCOUNTER — OFFICE VISIT (OUTPATIENT)
Dept: URGENT CARE | Facility: CLINIC | Age: 51
End: 2021-09-15
Payer: MEDICARE

## 2021-09-15 VITALS
TEMPERATURE: 99 F | OXYGEN SATURATION: 99 % | DIASTOLIC BLOOD PRESSURE: 82 MMHG | SYSTOLIC BLOOD PRESSURE: 131 MMHG | HEIGHT: 74 IN | BODY MASS INDEX: 25.28 KG/M2 | HEART RATE: 74 BPM | WEIGHT: 197 LBS

## 2021-09-15 DIAGNOSIS — Z20.822 COVID-19 VIRUS NOT DETECTED: ICD-10-CM

## 2021-09-15 DIAGNOSIS — R53.83 FATIGUE, UNSPECIFIED TYPE: ICD-10-CM

## 2021-09-15 DIAGNOSIS — R05.9 COUGH: Primary | ICD-10-CM

## 2021-09-15 LAB
CTP QC/QA: YES
SARS-COV-2 RDRP RESP QL NAA+PROBE: NEGATIVE

## 2021-09-15 PROCEDURE — 1159F MED LIST DOCD IN RCRD: CPT | Mod: CPTII,S$GLB,, | Performed by: NURSE PRACTITIONER

## 2021-09-15 PROCEDURE — 1160F RVW MEDS BY RX/DR IN RCRD: CPT | Mod: CPTII,S$GLB,, | Performed by: NURSE PRACTITIONER

## 2021-09-15 PROCEDURE — 3079F PR MOST RECENT DIASTOLIC BLOOD PRESSURE 80-89 MM HG: ICD-10-PCS | Mod: CPTII,S$GLB,, | Performed by: NURSE PRACTITIONER

## 2021-09-15 PROCEDURE — 1159F PR MEDICATION LIST DOCUMENTED IN MEDICAL RECORD: ICD-10-PCS | Mod: CPTII,S$GLB,, | Performed by: NURSE PRACTITIONER

## 2021-09-15 PROCEDURE — 3079F DIAST BP 80-89 MM HG: CPT | Mod: CPTII,S$GLB,, | Performed by: NURSE PRACTITIONER

## 2021-09-15 PROCEDURE — 99213 OFFICE O/P EST LOW 20 MIN: CPT | Mod: S$GLB,,, | Performed by: NURSE PRACTITIONER

## 2021-09-15 PROCEDURE — 3075F SYST BP GE 130 - 139MM HG: CPT | Mod: CPTII,S$GLB,, | Performed by: NURSE PRACTITIONER

## 2021-09-15 PROCEDURE — 3075F PR MOST RECENT SYSTOLIC BLOOD PRESS GE 130-139MM HG: ICD-10-PCS | Mod: CPTII,S$GLB,, | Performed by: NURSE PRACTITIONER

## 2021-09-15 PROCEDURE — 99213 PR OFFICE/OUTPT VISIT, EST, LEVL III, 20-29 MIN: ICD-10-PCS | Mod: S$GLB,,, | Performed by: NURSE PRACTITIONER

## 2021-09-15 PROCEDURE — U0002: ICD-10-PCS | Mod: QW,S$GLB,, | Performed by: NURSE PRACTITIONER

## 2021-09-15 PROCEDURE — 3008F BODY MASS INDEX DOCD: CPT | Mod: CPTII,S$GLB,, | Performed by: NURSE PRACTITIONER

## 2021-09-15 PROCEDURE — 3008F PR BODY MASS INDEX (BMI) DOCUMENTED: ICD-10-PCS | Mod: CPTII,S$GLB,, | Performed by: NURSE PRACTITIONER

## 2021-09-15 PROCEDURE — 1160F PR REVIEW ALL MEDS BY PRESCRIBER/CLIN PHARMACIST DOCUMENTED: ICD-10-PCS | Mod: CPTII,S$GLB,, | Performed by: NURSE PRACTITIONER

## 2021-09-15 PROCEDURE — U0002 COVID-19 LAB TEST NON-CDC: HCPCS | Mod: QW,S$GLB,, | Performed by: NURSE PRACTITIONER

## 2021-09-21 NOTE — ANESTHESIA RELEASE NOTE
"Anesthesia Release from PACU Note    Patient: Anmol Angeles JrNguyen    Procedure(s) Performed: Procedure(s) (LRB):  EXAM UNDER ANESTHESIA (N/A)  FISTULECTOMY  PLACEMENT-ANAL PLUG    Anesthesia type: general    Post pain: Adequate analgesia    Post assessment: no apparent anesthetic complications    Last Vitals:   Visit Vitals  /80 (BP Location: Right arm, Patient Position: Lying, BP Method: cNIBP)   Pulse (!) 49   Temp 36.5 °C (97.7 °F) (Temporal)   Resp 10   Ht 6' 2" (1.88 m)   Wt 83.9 kg (185 lb)   SpO2 100%   BMI 23.75 kg/m²       Post vital signs: stable    Level of consciousness: awake    Nausea/Vomiting: no nausea/no vomiting    Complications: none    Airway Patency: patent    Respiratory: unassisted    Cardiovascular: stable and blood pressure at baseline    Hydration: euvolemic  " Thalidomide Counseling: I discussed with the patient the risks of thalidomide including but not limited to birth defects, anxiety, weakness, chest pain, dizziness, cough and severe allergy.

## 2022-02-03 ENCOUNTER — OFFICE VISIT (OUTPATIENT)
Dept: PAIN MEDICINE | Facility: CLINIC | Age: 52
End: 2022-02-03
Payer: MEDICARE

## 2022-02-03 ENCOUNTER — TELEPHONE (OUTPATIENT)
Dept: PAIN MEDICINE | Facility: CLINIC | Age: 52
End: 2022-02-03

## 2022-02-03 VITALS
WEIGHT: 202.63 LBS | SYSTOLIC BLOOD PRESSURE: 151 MMHG | HEIGHT: 74 IN | BODY MASS INDEX: 26 KG/M2 | HEART RATE: 87 BPM | DIASTOLIC BLOOD PRESSURE: 88 MMHG | OXYGEN SATURATION: 98 %

## 2022-02-03 DIAGNOSIS — M54.16 LUMBAR RADICULOPATHY: ICD-10-CM

## 2022-02-03 DIAGNOSIS — M51.36 DDD (DEGENERATIVE DISC DISEASE), LUMBAR: Primary | ICD-10-CM

## 2022-02-03 DIAGNOSIS — M47.816 LUMBAR SPONDYLOSIS: ICD-10-CM

## 2022-02-03 PROCEDURE — 3079F PR MOST RECENT DIASTOLIC BLOOD PRESSURE 80-89 MM HG: ICD-10-PCS | Mod: CPTII,S$GLB,, | Performed by: PAIN MEDICINE

## 2022-02-03 PROCEDURE — 1159F MED LIST DOCD IN RCRD: CPT | Mod: CPTII,S$GLB,, | Performed by: PAIN MEDICINE

## 2022-02-03 PROCEDURE — 3079F DIAST BP 80-89 MM HG: CPT | Mod: CPTII,S$GLB,, | Performed by: PAIN MEDICINE

## 2022-02-03 PROCEDURE — 3008F BODY MASS INDEX DOCD: CPT | Mod: CPTII,S$GLB,, | Performed by: PAIN MEDICINE

## 2022-02-03 PROCEDURE — 3077F PR MOST RECENT SYSTOLIC BLOOD PRESSURE >= 140 MM HG: ICD-10-PCS | Mod: CPTII,S$GLB,, | Performed by: PAIN MEDICINE

## 2022-02-03 PROCEDURE — 99999 PR PBB SHADOW E&M-EST. PATIENT-LVL IV: ICD-10-PCS | Mod: PBBFAC,,, | Performed by: PAIN MEDICINE

## 2022-02-03 PROCEDURE — 1160F RVW MEDS BY RX/DR IN RCRD: CPT | Mod: CPTII,S$GLB,, | Performed by: PAIN MEDICINE

## 2022-02-03 PROCEDURE — 99214 PR OFFICE/OUTPT VISIT, EST, LEVL IV, 30-39 MIN: ICD-10-PCS | Mod: S$GLB,,, | Performed by: PAIN MEDICINE

## 2022-02-03 PROCEDURE — 99214 OFFICE O/P EST MOD 30 MIN: CPT | Mod: S$GLB,,, | Performed by: PAIN MEDICINE

## 2022-02-03 PROCEDURE — 99999 PR PBB SHADOW E&M-EST. PATIENT-LVL IV: CPT | Mod: PBBFAC,,, | Performed by: PAIN MEDICINE

## 2022-02-03 PROCEDURE — 1159F PR MEDICATION LIST DOCUMENTED IN MEDICAL RECORD: ICD-10-PCS | Mod: CPTII,S$GLB,, | Performed by: PAIN MEDICINE

## 2022-02-03 PROCEDURE — 1160F PR REVIEW ALL MEDS BY PRESCRIBER/CLIN PHARMACIST DOCUMENTED: ICD-10-PCS | Mod: CPTII,S$GLB,, | Performed by: PAIN MEDICINE

## 2022-02-03 PROCEDURE — 3077F SYST BP >= 140 MM HG: CPT | Mod: CPTII,S$GLB,, | Performed by: PAIN MEDICINE

## 2022-02-03 PROCEDURE — 3008F PR BODY MASS INDEX (BMI) DOCUMENTED: ICD-10-PCS | Mod: CPTII,S$GLB,, | Performed by: PAIN MEDICINE

## 2022-02-03 RX ORDER — GABAPENTIN 300 MG/1
600 CAPSULE ORAL 3 TIMES DAILY
Qty: 180 CAPSULE | Refills: 11 | Status: SHIPPED | OUTPATIENT
Start: 2022-02-03 | End: 2022-02-03

## 2022-02-03 RX ORDER — HYDROCODONE BITARTRATE AND ACETAMINOPHEN 10; 325 MG/1; MG/1
1 TABLET ORAL EVERY 6 HOURS PRN
Qty: 16 TABLET | Refills: 0 | Status: SHIPPED | OUTPATIENT
Start: 2022-02-03 | End: 2022-02-03

## 2022-02-03 RX ORDER — GABAPENTIN 300 MG/1
600 CAPSULE ORAL 3 TIMES DAILY
Qty: 180 CAPSULE | Refills: 11 | Status: SHIPPED | OUTPATIENT
Start: 2022-02-03 | End: 2022-03-22

## 2022-02-03 RX ORDER — HYDROCODONE BITARTRATE AND ACETAMINOPHEN 10; 325 MG/1; MG/1
1 TABLET ORAL EVERY 6 HOURS PRN
Qty: 16 TABLET | Refills: 0 | Status: SHIPPED | OUTPATIENT
Start: 2022-02-03 | End: 2022-02-07

## 2022-02-03 RX ORDER — IBUPROFEN 800 MG/1
800 TABLET ORAL 3 TIMES DAILY
Qty: 90 TABLET | Refills: 0 | Status: SHIPPED | OUTPATIENT
Start: 2022-02-03 | End: 2022-03-05

## 2022-02-03 RX ORDER — IBUPROFEN 800 MG/1
800 TABLET ORAL 3 TIMES DAILY
Qty: 90 TABLET | Refills: 0 | Status: SHIPPED | OUTPATIENT
Start: 2022-02-03 | End: 2022-02-03

## 2022-02-03 NOTE — TELEPHONE ENCOUNTER
Spoke with patient about his medications. Stated the [facility] would not fill his prescriptions since they had already been filled at another location. A review of the order shows the medication was originally sent to the [facility] and discontinued. The medication had been already filled by the pharmacy. The pharmacy was called and informed the order was sent to the new pharmacy and asked the order be canceled in their system so it could be filled at [facility]. Patient was called back and informed that the order should now be available to be filled at his location. Verbalized understanding. No further issues discussed.

## 2022-02-03 NOTE — PATIENT INSTRUCTIONS
Gabapentin 300mg pills  Start taking one pill in the morning, one pill in the afternoon and one pill at night. (3 pills total per day.)  After three days start taking one pill in the morning, one pill in the afternoon and two pills at night. (4 pills total per day.)  After three days start taking two pills in the morning, one pill in the afternoon and two pills at night. (5 pills total per day.)  After three days start taking two pills in the morning, two pills in the afternoon and two pills at night. (6 pills total per day.)  Stay at this dose until next visit.   If experiencing any side effects take highest dose tolerated.

## 2022-02-03 NOTE — TELEPHONE ENCOUNTER
----- Message from Alana Connell sent at 2/3/2022  3:41 PM CST -----  Regarding: speak with nurse  Contact: patient  [phone]   please call patient ASAP said he need help to find out where his medication was sent to waiting on a call back from the nurse thanks.

## 2022-02-14 ENCOUNTER — HOSPITAL ENCOUNTER (OUTPATIENT)
Dept: RADIOLOGY | Facility: OTHER | Age: 52
Discharge: HOME | End: 2022-02-14
Attending: PAIN MEDICINE
Payer: MEDICARE

## 2022-02-14 ENCOUNTER — PATIENT MESSAGE (OUTPATIENT)
Dept: RESEARCH | Facility: HOSPITAL | Age: 52
End: 2022-02-14
Payer: MEDICARE

## 2022-02-14 DIAGNOSIS — M47.816 LUMBAR SPONDYLOSIS: ICD-10-CM

## 2022-02-14 DIAGNOSIS — M51.36 DDD (DEGENERATIVE DISC DISEASE), LUMBAR: ICD-10-CM

## 2022-02-14 DIAGNOSIS — M54.16 LUMBAR RADICULOPATHY: ICD-10-CM

## 2022-02-14 PROCEDURE — 72110 X-RAY EXAM L-2 SPINE 4/>VWS: CPT | Mod: 26,,, | Performed by: RADIOLOGY

## 2022-02-14 PROCEDURE — 72110 XR LUMBAR SPINE AP AND LAT WITH FLEX/EXT: ICD-10-PCS | Mod: 26,,, | Performed by: RADIOLOGY

## 2022-02-14 PROCEDURE — 72148 MRI LUMBAR SPINE WITHOUT CONTRAST: ICD-10-PCS | Mod: 26,,, | Performed by: RADIOLOGY

## 2022-02-14 PROCEDURE — 72148 MRI LUMBAR SPINE W/O DYE: CPT | Mod: TC

## 2022-02-14 PROCEDURE — 72110 X-RAY EXAM L-2 SPINE 4/>VWS: CPT | Mod: TC,FY

## 2022-02-14 PROCEDURE — 72148 MRI LUMBAR SPINE W/O DYE: CPT | Mod: 26,,, | Performed by: RADIOLOGY

## 2022-02-15 ENCOUNTER — OFFICE VISIT (OUTPATIENT)
Dept: PAIN MEDICINE | Facility: CLINIC | Age: 52
End: 2022-02-15
Payer: MEDICARE

## 2022-02-15 VITALS
DIASTOLIC BLOOD PRESSURE: 90 MMHG | WEIGHT: 202.81 LBS | SYSTOLIC BLOOD PRESSURE: 153 MMHG | HEART RATE: 79 BPM | BODY MASS INDEX: 26.03 KG/M2 | OXYGEN SATURATION: 100 % | HEIGHT: 74 IN

## 2022-02-15 DIAGNOSIS — M54.16 LUMBAR RADICULOPATHY: Primary | ICD-10-CM

## 2022-02-15 DIAGNOSIS — M47.816 LUMBAR SPONDYLOSIS: ICD-10-CM

## 2022-02-15 DIAGNOSIS — M51.36 DDD (DEGENERATIVE DISC DISEASE), LUMBAR: ICD-10-CM

## 2022-02-15 PROCEDURE — 1159F PR MEDICATION LIST DOCUMENTED IN MEDICAL RECORD: ICD-10-PCS | Mod: CPTII,S$GLB,, | Performed by: PAIN MEDICINE

## 2022-02-15 PROCEDURE — 3008F BODY MASS INDEX DOCD: CPT | Mod: CPTII,S$GLB,, | Performed by: PAIN MEDICINE

## 2022-02-15 PROCEDURE — 1160F RVW MEDS BY RX/DR IN RCRD: CPT | Mod: CPTII,S$GLB,, | Performed by: PAIN MEDICINE

## 2022-02-15 PROCEDURE — 99999 PR PBB SHADOW E&M-EST. PATIENT-LVL V: ICD-10-PCS | Mod: PBBFAC,,, | Performed by: PAIN MEDICINE

## 2022-02-15 PROCEDURE — 99999 PR PBB SHADOW E&M-EST. PATIENT-LVL V: CPT | Mod: PBBFAC,,, | Performed by: PAIN MEDICINE

## 2022-02-15 PROCEDURE — 1160F PR REVIEW ALL MEDS BY PRESCRIBER/CLIN PHARMACIST DOCUMENTED: ICD-10-PCS | Mod: CPTII,S$GLB,, | Performed by: PAIN MEDICINE

## 2022-02-15 PROCEDURE — 3008F PR BODY MASS INDEX (BMI) DOCUMENTED: ICD-10-PCS | Mod: CPTII,S$GLB,, | Performed by: PAIN MEDICINE

## 2022-02-15 PROCEDURE — 99214 OFFICE O/P EST MOD 30 MIN: CPT | Mod: S$GLB,,, | Performed by: PAIN MEDICINE

## 2022-02-15 PROCEDURE — 3077F PR MOST RECENT SYSTOLIC BLOOD PRESSURE >= 140 MM HG: ICD-10-PCS | Mod: CPTII,S$GLB,, | Performed by: PAIN MEDICINE

## 2022-02-15 PROCEDURE — 99214 PR OFFICE/OUTPT VISIT, EST, LEVL IV, 30-39 MIN: ICD-10-PCS | Mod: S$GLB,,, | Performed by: PAIN MEDICINE

## 2022-02-15 PROCEDURE — 3080F PR MOST RECENT DIASTOLIC BLOOD PRESSURE >= 90 MM HG: ICD-10-PCS | Mod: CPTII,S$GLB,, | Performed by: PAIN MEDICINE

## 2022-02-15 PROCEDURE — 3080F DIAST BP >= 90 MM HG: CPT | Mod: CPTII,S$GLB,, | Performed by: PAIN MEDICINE

## 2022-02-15 PROCEDURE — 3077F SYST BP >= 140 MM HG: CPT | Mod: CPTII,S$GLB,, | Performed by: PAIN MEDICINE

## 2022-02-15 PROCEDURE — 1159F MED LIST DOCD IN RCRD: CPT | Mod: CPTII,S$GLB,, | Performed by: PAIN MEDICINE

## 2022-02-15 NOTE — PATIENT INSTRUCTIONS
Reviewed pre op instructions with patient:    1. Please leave jewelry and valuables at home or give them to your escort for safe keeping.  2. You will be required to have an adult to escort you after the procedure is over. Although we will not be sedating you for your procedure we ask for an escort for safety after the procedure.  3. Do not take over the counter blood thinning medications beginning 7 days before the procedure (aspirin, Motrin, ibuprofen, Advil, Aleve, naproxen). If you are taking prescribed thinners (Coumadin, warfarin, apixaban, Eliquis, Plavix, clopidogrel, Pletal, etc) we will obtain cardiac clearance from your cardiologist or provider that is monitoring your medications and levels to hold the medications if appropriate.  4. Cleanse your skin the evening before with an antibacterial soap (Dial or Hibiclens) and then repeat it again the morning of the procedure.  5. Do not apply lotions, creams, deodorants, perfumes, or colognes the morning of the procedure.  6. You will need to have your COVID testing done on the Monday before the scheduled procedure.  7. You will be contacted on the Wednesday prior to the procedure to be given the time to arrive the morning of the procedure.  8. Please do not eat or drink after midnight before the procedure.    Patient verbalized understanding of the instructions provided to them and clinic contact number was provided for any further questions they may have.

## 2022-02-15 NOTE — PROGRESS NOTES
Subjective:     Patient ID: Anmol Angeles Jr. is a 51 y.o. male    Chief Complaint: Low-back Pain (F/U for image results of MRI & x-ray)      Referred by: No ref. provider found      HPI:     Interval History (2/15/22):  He returns today for follow up and imaging review.  He reports that his pain is unchanged in quality location since last encounter.  He denies any new worsening symptoms.     Interval History (2/3/22):  He returns today for follow up.  He reports that he is having acute low back pain.  States the with lifting a door 4 days ago and felt a sudden onset of low back pain.  The pain is located generally throughout the lumbar spine.  He denies any definitive pain radiating to lower extremities but does have some pain in his legs especially early in the morning.  He denies any new numbness, tingling, weakness, bowel bladder dysfunction.  He has difficulty sitting or lying down due to the pain.      Interval History (3/30/21):  He returns today for follow up.  He reports that T1-2 interlaminar epidural steroid injection has been helpful for the neck pain and vertigo.  He reports 100% relief of his vertigo symptoms.  He reports very significant improvement as bilateral neck pain.  He still has some stiffness and pain but otherwise is doing very well.  He does not feel that further treatment is needed at this time.      Interval History (2/25/21):  He returns today for follow up.  He reports that repeat caudal epidural steroid injection did provide very significant relief of his low back pain.  He states that he is beginning to have recurrent low back pain at this time.  More bothersome at this time however is his significant neck and upper extremity pain.  He has been evaluated by Neurosurgery who recommended further interventional procedures prior to considering cervical fusion.  Patient also reports having significant vertigo symptoms are quite bothersome.  Otherwise he denies any new numbness,  tingling, weakness, bowel bladder dysfunction.  He denies any significant changes in the quality or distribution of his pain.       Interval History (12/12/19):  He returns today for follow up and MRI review.  He reports that his symptoms have been unchanged since last encounter.  He denies any new or worsening symptoms.  He has an appointment with neurosurgery on 12/16/19.      Interval History (11/25/19):  He returns today for follow up.  He reports that caudal epidural steroid injection has not been helpful for the low back pain.  He denies any changes in the quality or location was low back pain.  He attended physical therapy for his low back during of his sessions began to have some mild neck pain. This pain developed into severe neck pain and stiffness with associated vertigo.  Patient was evaluated emergency room.  Patient states pain is interfering with his sleep.  He denies any numbness, tingling, weakness, bowel bladder dysfunction.  He states that he has taken Robaxin and this medicine help with his pain and help him to relax of aching sleep.      Initial Encounter (10/1/19):  Anmol Angeles JrNguyen is a 51 y.o. male who presents today with chronic bilateral low back pain that radiates the right lower extremity.  This pain has been present for many years.  No specific inciting event or injury noted.  Patient is status post low back surgery before 2005 for this issue.  He states that this surgery mostly help with his pain except for an occasional flare up that would last for about 3-4 days.  His current episode of pain has lasted for 2-3 months and involves left-sided low back and lower extremity.  He also reports some tightness in the leg.  Pain is constant worse with activity.  Pain is also worse with initiating activity after rest.  He reports diffuse numbness and tingling throughout his body.  He denies any weakness.  He denies any bowel or bladder dysfunction.    Physical Therapy:   "Yes.    Non-pharmacologic Treatment:  Rest helps         · TENS?  No    Pain Medications:         · Currently taking:  Nothing    · Has tried in the past:  Medrol Dosepak, Flexeril, Robaxin, ibuprofen, Norco    · Has not tried:  TCAs, SNRIs, anticonvulsants, topical creams    Blood thinners:  None    Interventional Therapies:    10/10/19 - caudal epidural steroid injection - no relief noted  7/16/20 - caudal epidural steroid injection - significant relief for at least 6 months  3/18/21 - T1-2 interlaminar epidural steroid injection - 100% relief of vertigo and very significant relief of neck pain    Relevant Surgeries:  Previous low back surgery before 2005    Affecting sleep?  Yes    Affecting daily activities? yes    Depressive symptoms? yes          · SI/HI? No    Work status: Disabled    Pain Scores:    Best:       6/10  Worst:    10/10  Usually:   7/10  Today:     9/10    Review of Systems   Constitutional: Negative for activity change, appetite change, chills, fatigue, fever and unexpected weight change.   HENT: Negative for hearing loss.    Eyes: Negative for visual disturbance.   Respiratory: Negative for chest tightness and shortness of breath.    Cardiovascular: Negative for chest pain.   Gastrointestinal: Negative for abdominal pain, constipation, diarrhea, nausea and vomiting.   Genitourinary: Negative for difficulty urinating.   Musculoskeletal: Positive for arthralgias, back pain, gait problem, myalgias, neck pain and neck stiffness.   Skin: Negative for rash.   Neurological: Negative for dizziness, weakness, light-headedness, numbness and headaches.   Psychiatric/Behavioral: Positive for sleep disturbance. Negative for hallucinations and suicidal ideas. The patient is not nervous/anxious.        Past Medical History:   Diagnosis Date    Anxiety     Back pain     Chronic back pain     sylvester commented it has been "years" and is related to the "type of hard work" he has done all his life and that it " "is an ongoing pain he deals with daily     Colon polyp     Depressive disorder, not elsewhere classified 11/25/2014    Perirectal fistula     Seizures     "about 7 years ago, had 3 episodes and has not had a seizure in over 2 years" patient spoke in length related to not having a seizure disorder and felt unheard by many doctors when he described his events 7 years ago to his doctors       Past Surgical History:   Procedure Laterality Date    BACK SURGERY      2003/2004    CAUDAL EPIDURAL STEROID INJECTION N/A 10/10/2019    Procedure: Injection-steroid-epidural-caudal;  Surgeon: Amol Grady Jr., MD;  Location: [facility] OR;  Service: Pain Management;  Laterality: N/A;    CAUDAL EPIDURAL STEROID INJECTION N/A 7/16/2020    Procedure: Injection-steroid-epidural-caudal;  Surgeon: Amol Grady Jr., MD;  Location: [facility] OR;  Service: Pain Management;  Laterality: N/A;    COLONOSCOPY N/A 8/22/2016    Procedure: COLONOSCOPY;  Surgeon: TROY Inman MD;  Location: [facility] ENDO (4TH FLR);  Service: Endoscopy;  Laterality: N/A;    EPIDURAL STEROID INJECTION N/A 3/18/2021    Procedure: Injection, Steroid, Epidural---T1-2;  Surgeon: Amol Grady Jr., MD;  Location: [facility] OR;  Service: Pain Management;  Laterality: N/A;    STEPHENIE  03/18/2021    EXAMINATION UNDER ANESTHESIA N/A 10/12/2018    Procedure: Exam under anesthesia;  Surgeon: TROY Inman MD;  Location: [facility] OR 2ND FLR;  Service: Colon and Rectal;  Laterality: N/A;    fistula surgery      perirectal    HAND SURGERY Left     INSERTION OF SETON STITCH N/A 10/12/2018    Procedure: PLACEMENT, SETON STITCH;  Surgeon: TROY Inman MD;  Location: [facility] OR 2ND FLR;  Service: Colon and Rectal;  Laterality: N/A;    LEG SURGERY      plate    LEG SURGERY Right 1993       Social History     Socioeconomic History    Marital status: Single   Occupational History    Occupation: Maintenance with [employer]     Employer: [employer]    Tobacco " "Use    Smoking status: Current Every Day Smoker     Packs/day: 1.00     Years: 30.00     Pack years: 30.00     Types: Cigarettes    Smokeless tobacco: Never Used   Substance and Sexual Activity    Alcohol use: Yes     Comment: beer occasionally     Drug use: Yes     Types: Marijuana    Sexual activity: Yes       Review of patient's allergies indicates:   Allergen Reactions    Latex, natural rubber        Current Outpatient Medications on File Prior to Visit   Medication Sig Dispense Refill    gabapentin (NEURONTIN) 300 MG capsule Take 2 capsules (600 mg total) by mouth 3 (three) times daily. 180 capsule 11    ibuprofen (ADVIL,MOTRIN) 800 MG tablet Take 1 tablet (800 mg total) by mouth 3 (three) times daily. 90 tablet 0     Current Facility-Administered Medications on File Prior to Visit   Medication Dose Route Frequency Provider Last Rate Last Admin    0.9%  NaCl infusion   Intravenous Continuous Amol Grady Jr., MD   New Bag at 07/16/20 0843    dexAMETHasone sodium phos (PF) injection 10 mg  10 mg Epidural Once Amol Grady Jr., MD        fentaNYL injection 100 mcg  100 mcg Intravenous PRN Amol Grady Jr., MD   50 mcg at 07/16/20 0903    lidocaine (PF) 10 mg/ml (1%) injection 10 mg  1 mL Other Once Amol Grady Jr., MD        lidocaine HCL 20 mg/ml (2%) injection 10 mL  10 mL Other Once Amol Grady Jr., MD        midazolam (VERSED) 1 mg/mL injection 5 mg  5 mg Intravenous PRN Amol Grady Jr., MD   1 mg at 07/16/20 0900       Objective:      BP (!) 153/90 (BP Location: Left arm, Patient Position: Sitting, BP Method: Medium (Automatic))   Pulse 79   Ht 6' 2" (1.88 m)   Wt 92 kg (202 lb 13.2 oz)   SpO2 100%   BMI 26.04 kg/m²     Exam:  GEN:  Well developed, well nourished.  Patient visibly uncomfortable throughout visit.  HEENT:  No trauma.  Mucous membranes moist.  Nares patent bilaterally.  PSYCH: Normal affect. Thought content appropriate.  CHEST:  " Breathing symmetric.  No audible wheezing.  ABD: Soft, non-distended.  SKIN:  Warm, pink, dry.  No rash on exposed areas.    EXT:  No cyanosis, clubbing, or edema.  No color change or changes in nail or hair growth.  NEURO/MUSCULOSKELETAL:  Fully alert, oriented, and appropriate. Speech normal duc. No cranial nerve deficits.   Gait:  Antalgic.  No focal motor deficits.               Imaging:      Narrative & Impression    EXAMINATION:  XR LUMBAR SPINE AP AND LAT WITH FLEX/EXT     CLINICAL HISTORY:  Other intervertebral disc degeneration, lumbar region     TECHNIQUE:  AP and lateral views as well as lateral flexion and extension images are performed through the lumbar spine.     COMPARISON:  06/01/2020     FINDINGS:  Lumbar vertebral body heights are maintained.  Disc space narrowing L4-5 and L5-S1.  Posterior osteophyte L4-5.  Facet arthropathy L3-4 through L5-S1.  AP alignment is anatomic.     Impression:     No acute osseous abnormality seen.  Stable degenerative change lower lumbar spine.        Electronically signed by: Yaritza Henson  Date:                                            02/14/2022  Time:                                           13:30           Narrative & Impression    EXAMINATION:  MRI LUMBAR SPINE WITHOUT CONTRAST     CLINICAL HISTORY:  lumbar radiculopathy; Other intervertebral disc degeneration, lumbar region     TECHNIQUE:  Multiplanar, multisequence MR images were acquired from the thoracolumbar junction to the sacrum without the administration of contrast.     COMPARISON:  09/12/2019     FINDINGS:  The marrow demonstrates homogeneous signal.  Vertebral body heights are maintained.  Disc space narrowing and endplate changes most pronounced L4-5 and L5-S1. Conus terminates appropriately at T12-L1.     Multilevel degenerative change as diesel below:     L1-2: Facet arthropathy with mild bilateral neural foraminal narrowing.     L2-3: Diffuse posterior broad-based disc bulge, ligamentum  flavum hypertrophy, and facet hypertrophic changes contribute to moderate canal and moderate left neural foraminal narrowing.  There may be a superimposed central disc protrusion, decreased in size compared to prior.     L3-4: Diffuse posterior broad-based disc bulge, ligamentum flavum hypertrophy, and facet hypertrophic changes contribute to moderate-severe canal narrowing.  Severe left and moderate right neural foraminal narrowing.     L4-5: Diffuse posterior broad-based disc bulge, ligamentum flavum hypertrophy, and facet hypertrophic changes.  Previous right laminectomy.  No significant narrowing of the central canal.  Neural foraminal narrowing, right greater than left.     L5-S1: Small diffuse posterior broad-based disc bulge without significant canal narrowing.  Moderate bilateral neural foraminal narrowing.     Impression:     Multilevel degenerative change with no detrimental change compared to prior.     Continued canal narrowing L2-3 and L3-4.  Disc protrusion at L2-3 is decreased in mildly decreased in size with improved canal narrowing.     Multilevel neural foraminal narrowing, moderate to severe, as given in detail above.        Electronically signed by: Yaritza Henson  Date:                                            02/14/2022  Time:                                           15:02             Narrative     EXAMINATION:  MRI CERVICAL SPINE WITHOUT CONTRAST    CLINICAL HISTORY:  cervical spinal stenosis;.  Other cervical disc degeneration, unspecified cervical region    TECHNIQUE:  Multiplanar, multisequence MR images of the cervical spine were acquired without the administration of contrast.    COMPARISON:  None.    FINDINGS:  Sagittal images demonstrate the vertebrae to be anatomically aligned.  There are normal in height and signal intensity without an acute marrow placement process or bone marrow edema.  There is no compression deformity.  There is desiccation and disc space narrowing to a  moderate degree at C5-6 and to a lesser degree C6-7.  Cervical cord is remarkable for edematous changes at the C3-4 disc level and to a less significant degree at the C5-6 level.  There is no mass lesion or cord expansion.  The paraspinal soft tissues are unremarkable.    C2-3.  Axial images show eccentric disc bulging and osteophyte complex to the right side along with facet and uncovertebral joint disease causing moderate right-sided foraminal narrowing.  There is no spinal stenosis.    C3-4.  Broad-based left paracentral protrusion of the disc impresses on the cervical cord facet and uncovertebral joint disease and to the moderate left-sided canal and foraminal narrowing.  Cord edema is evident without mass.    C4-5.  Asymmetric left-sided canal and foraminal narrowing is appreciated secondary to protrusion of the disc and uncovertebral joint disease.  There is also noted mild/moderate right-sided foraminal narrowing secondary to facet arthropathy.  There is no cord edema.    C5-6.  Bilateral foraminal narrowing is present.  This is moderate to severe on the right side secondary to facet and uncovertebral joint disease.  It is mild to moderate on the left side.  Spinal stenosis is minimal.    C6-7.  Mild spinal stenosis is present along with bilateral foraminal narrowing due to disc bulging and hypertrophic facet disease.    C7-T1.  No spinal stenosis or foraminal narrowing of significance.   Impression       1. Mild cord edema at the C3-4 level.  This is likely the result of recent trauma and moderate spinal stenosis.  There is a left paracentral disc protrusion along with facet arthropathy.  2. Moderate asymmetric left-sided spinal canal and foraminal narrowing at C4-5 secondary to disc protrusion and facet arthropathy.  3. Bilateral foraminal narrowing at C5-6 more significant on the right side.  4. Mild spinal stenosis and bilateral foraminal narrowing at C6-7.      Electronically signed by: Brijesh  Pflug  Date: 11/29/2019  Time: 08:51    Encounter     View Encounter                             Narrative     EXAMINATION:  MRI LUMBAR SPINE WITHOUT CONTRAST    CLINICAL HISTORY:  evaluate for lumbar annular tear, history of lumbar surgery;.  Other specified postprocedural states    TECHNIQUE:  Sagittal and axial T1 and T2 W images    COMPARISON:  Correlation is made to lumbar spine series 02/09/2015.    FINDINGS:  Sagittal images demonstrate straightening of lumbar lordosis with minimal retrolisthesis L5 on S1.  Alignment otherwise anatomic.    Disc degenerative change including desiccation and/or decreased height T12-L1 and L2-3 through L5-S1 discs.  Small multilevel anterior osteophytes noted.    L2-3, bulging of the disc is present with superimposed left paracentral protrusion resulting in moderate flattening of the adjacent ventral thecal sac and overall mild degree of canal stenosis.  Mild bilateral foraminal narrowing is also present.    L3-4, bulging of the disc is present with associated spondylosis and results in moderate effacement of the thecal sac, moderate left and mild right foraminal narrowing and overall mild to moderate degree of canal stenosis.    L4-5, mild bulging of the disc and associated spondylosis are present with mild impression on ventral thecal sac and moderate bilateral foraminal narrowing.    L5-S1, bulging of the disc and associated spondylosis are present with secondary fairly severe bilateral foraminal narrowing.    No disc herniation, canal compromise or foraminal compromise appreciated remaining lumbar or visualized lower thoracic disc levels.  The conus medullaris has a normal contour and signal.  Marrow degenerative change adjacent to the L5-S1 disc.   Impression       Straightening of lumbar lordosis.    L2-3, bulging of the disc with superimposed left paracentral protrusion combination of findings resulting in moderate effacement ventral subarachnoid space and mild canal  stenosis along with mild foraminal narrowing..    L3-4, bulging of the disc and associated spondylosis with moderate effacement ventral thecal sac and resulting in mild to moderate foraminal narrowing and overall mild to moderate degree of canal stenosis.    L4-5, mild bulging of the disc and associated spondylosis with moderate bilateral foraminal narrowing.    L5-S1, bulging of the disc and associated spondylosis with severe foraminal narrowing.      Electronically signed by: Kathy Mitchell MD  Date: 09/12/2019  Time: 11:02         Assessment:       Encounter Diagnoses   Name Primary?    Lumbar radiculopathy Yes    DDD (degenerative disc disease), lumbar     Lumbar spondylosis          Plan:       Anmol was seen today for low-back pain.    Diagnoses and all orders for this visit:    Lumbar radiculopathy  -     Case Request Operating Room: Injection-steroid-epidural-caudal    DDD (degenerative disc disease), lumbar  -     Case Request Operating Room: Injection-steroid-epidural-caudal    Lumbar spondylosis        Anmol Angeles Jr. is a 51 y.o. male with acute exacerbation of chronic bilateral low back pain that radiates to the bilateral lower extremities.  He is known to have multilevel disc degeneration and multilevel foraminal stenosis with multilevel spinal stenosis as well.  Updated imaging does not show significant changes from previous studies.    1.  Pertinent imaging studies reviewed by me. Imaging results were discussed with patient.  2.  Continue gabapentin and ibuprofen.  3.  Continue Norco as needed.  Patient states this medication is not very effective for his pain and he has not been taking it very much.  4.  Schedule for caudal epidural steroid injection.  5.  Return to clinic after procedure to discuss results.  May consider Neurosurgery referral if pain persists or worsens.

## 2022-03-02 ENCOUNTER — TELEPHONE (OUTPATIENT)
Dept: PAIN MEDICINE | Facility: CLINIC | Age: 52
End: 2022-03-02
Payer: MEDICARE

## 2022-03-02 NOTE — TELEPHONE ENCOUNTER
Returned patient's call. Told him that his procedure begins at 7:30 AM so he should be at the hospital for 6:15 - 6:30 AM. Patient stated he remembered all of the instructions that Bacilio told him and he verbalized understanding arrival time and where to check in at. Nothing further discussed.

## 2022-03-02 NOTE — TELEPHONE ENCOUNTER
----- Message from Maria R Thurston sent at 3/2/2022 10:34 AM CST -----  Regarding: Report time and instructions  Contact: [phone]  Calling to get report time and instructions. Please call

## 2022-03-16 ENCOUNTER — PATIENT MESSAGE (OUTPATIENT)
Dept: ADMINISTRATIVE | Facility: HOSPITAL | Age: 52
End: 2022-03-16
Payer: MEDICARE

## 2022-03-22 ENCOUNTER — OFFICE VISIT (OUTPATIENT)
Dept: PAIN MEDICINE | Facility: CLINIC | Age: 52
End: 2022-03-22
Payer: MEDICARE

## 2022-03-22 VITALS
BODY MASS INDEX: 24.71 KG/M2 | SYSTOLIC BLOOD PRESSURE: 138 MMHG | OXYGEN SATURATION: 100 % | DIASTOLIC BLOOD PRESSURE: 88 MMHG | HEART RATE: 87 BPM | HEIGHT: 74 IN | RESPIRATION RATE: 16 BRPM | WEIGHT: 192.56 LBS

## 2022-03-22 DIAGNOSIS — M51.36 DDD (DEGENERATIVE DISC DISEASE), LUMBAR: Primary | ICD-10-CM

## 2022-03-22 DIAGNOSIS — M54.16 LUMBAR RADICULOPATHY: ICD-10-CM

## 2022-03-22 DIAGNOSIS — M47.816 LUMBAR SPONDYLOSIS: ICD-10-CM

## 2022-03-22 PROCEDURE — 1159F MED LIST DOCD IN RCRD: CPT | Mod: CPTII,S$GLB,, | Performed by: PAIN MEDICINE

## 2022-03-22 PROCEDURE — 1160F RVW MEDS BY RX/DR IN RCRD: CPT | Mod: CPTII,S$GLB,, | Performed by: PAIN MEDICINE

## 2022-03-22 PROCEDURE — 99213 OFFICE O/P EST LOW 20 MIN: CPT | Mod: S$GLB,,, | Performed by: PAIN MEDICINE

## 2022-03-22 PROCEDURE — 1160F PR REVIEW ALL MEDS BY PRESCRIBER/CLIN PHARMACIST DOCUMENTED: ICD-10-PCS | Mod: CPTII,S$GLB,, | Performed by: PAIN MEDICINE

## 2022-03-22 PROCEDURE — 3079F PR MOST RECENT DIASTOLIC BLOOD PRESSURE 80-89 MM HG: ICD-10-PCS | Mod: CPTII,S$GLB,, | Performed by: PAIN MEDICINE

## 2022-03-22 PROCEDURE — 1159F PR MEDICATION LIST DOCUMENTED IN MEDICAL RECORD: ICD-10-PCS | Mod: CPTII,S$GLB,, | Performed by: PAIN MEDICINE

## 2022-03-22 PROCEDURE — 3008F BODY MASS INDEX DOCD: CPT | Mod: CPTII,S$GLB,, | Performed by: PAIN MEDICINE

## 2022-03-22 PROCEDURE — 3075F SYST BP GE 130 - 139MM HG: CPT | Mod: CPTII,S$GLB,, | Performed by: PAIN MEDICINE

## 2022-03-22 PROCEDURE — 99213 PR OFFICE/OUTPT VISIT, EST, LEVL III, 20-29 MIN: ICD-10-PCS | Mod: S$GLB,,, | Performed by: PAIN MEDICINE

## 2022-03-22 PROCEDURE — 3079F DIAST BP 80-89 MM HG: CPT | Mod: CPTII,S$GLB,, | Performed by: PAIN MEDICINE

## 2022-03-22 PROCEDURE — 99999 PR PBB SHADOW E&M-EST. PATIENT-LVL III: ICD-10-PCS | Mod: PBBFAC,,, | Performed by: PAIN MEDICINE

## 2022-03-22 PROCEDURE — 3075F PR MOST RECENT SYSTOLIC BLOOD PRESS GE 130-139MM HG: ICD-10-PCS | Mod: CPTII,S$GLB,, | Performed by: PAIN MEDICINE

## 2022-03-22 PROCEDURE — 99999 PR PBB SHADOW E&M-EST. PATIENT-LVL III: CPT | Mod: PBBFAC,,, | Performed by: PAIN MEDICINE

## 2022-03-22 PROCEDURE — 3008F PR BODY MASS INDEX (BMI) DOCUMENTED: ICD-10-PCS | Mod: CPTII,S$GLB,, | Performed by: PAIN MEDICINE

## 2022-03-22 NOTE — PROGRESS NOTES
Subjective:     Patient ID: Anmol Angeles Jr. is a 51 y.o. male    Chief Complaint: Follow-up (Post STEPHENIE)      Referred by: No ref. provider found      HPI:     Interval History (3/22/22):  He returns today for follow up.  He reports that caudal epidural steroid injection has been helpful for the low back and lower extremity pain.  He reports roughly 80% relief following this procedure.  He is happy with these results and does not feel that further treatment is needed at this time.      Interval History (2/15/22):  He returns today for follow up and imaging review.  He reports that his pain is unchanged in quality location since last encounter.  He denies any new worsening symptoms.     Interval History (2/3/22):  He returns today for follow up.  He reports that he is having acute low back pain.  States the with lifting a door 4 days ago and felt a sudden onset of low back pain.  The pain is located generally throughout the lumbar spine.  He denies any definitive pain radiating to lower extremities but does have some pain in his legs especially early in the morning.  He denies any new numbness, tingling, weakness, bowel bladder dysfunction.  He has difficulty sitting or lying down due to the pain.      Interval History (3/30/21):  He returns today for follow up.  He reports that T1-2 interlaminar epidural steroid injection has been helpful for the neck pain and vertigo.  He reports 100% relief of his vertigo symptoms.  He reports very significant improvement as bilateral neck pain.  He still has some stiffness and pain but otherwise is doing very well.  He does not feel that further treatment is needed at this time.      Interval History (2/25/21):  He returns today for follow up.  He reports that repeat caudal epidural steroid injection did provide very significant relief of his low back pain.  He states that he is beginning to have recurrent low back pain at this time.  More bothersome at this time however is  his significant neck and upper extremity pain.  He has been evaluated by Neurosurgery who recommended further interventional procedures prior to considering cervical fusion.  Patient also reports having significant vertigo symptoms are quite bothersome.  Otherwise he denies any new numbness, tingling, weakness, bowel bladder dysfunction.  He denies any significant changes in the quality or distribution of his pain.       Interval History (12/12/19):  He returns today for follow up and MRI review.  He reports that his symptoms have been unchanged since last encounter.  He denies any new or worsening symptoms.  He has an appointment with neurosurgery on 12/16/19.      Interval History (11/25/19):  He returns today for follow up.  He reports that caudal epidural steroid injection has not been helpful for the low back pain.  He denies any changes in the quality or location was low back pain.  He attended physical therapy for his low back during of his sessions began to have some mild neck pain. This pain developed into severe neck pain and stiffness with associated vertigo.  Patient was evaluated emergency room.  Patient states pain is interfering with his sleep.  He denies any numbness, tingling, weakness, bowel bladder dysfunction.  He states that he has taken Robaxin and this medicine help with his pain and help him to relax of aching sleep.      Initial Encounter (10/1/19):  Anmol Angeles Jr. is a 51 y.o. male who presents today with chronic bilateral low back pain that radiates the right lower extremity.  This pain has been present for many years.  No specific inciting event or injury noted.  Patient is status post low back surgery before 2005 for this issue.  He states that this surgery mostly help with his pain except for an occasional flare up that would last for about 3-4 days.  His current episode of pain has lasted for 2-3 months and involves left-sided low back and lower extremity.  He also reports some  tightness in the leg.  Pain is constant worse with activity.  Pain is also worse with initiating activity after rest.  He reports diffuse numbness and tingling throughout his body.  He denies any weakness.  He denies any bowel or bladder dysfunction.    Physical Therapy:  Yes.    Non-pharmacologic Treatment:  Rest helps         · TENS?  No    Pain Medications:         · Currently taking:  Nothing    · Has tried in the past:  Medrol Dosepak, Flexeril, Robaxin, ibuprofen, Norco    · Has not tried:  TCAs, SNRIs, anticonvulsants, topical creams    Blood thinners:  None    Interventional Therapies:    10/10/19 - caudal epidural steroid injection - no relief noted  7/16/20 - caudal epidural steroid injection - significant relief for at least 6 months  3/18/21 - T1-2 interlaminar epidural steroid injection - 100% relief of vertigo and very significant relief of neck pain  3/3/22 - caudal epidural steroid injection - 80% relief    Relevant Surgeries:  Previous low back surgery before 2005    Affecting sleep?  Yes    Affecting daily activities? yes    Depressive symptoms? yes          · SI/HI? No    Work status: Disabled    Pain Scores:    Best:       6/10  Worst:    10/10  Usually:   7/10  Today:     9/10    Review of Systems   Constitutional: Negative for activity change, appetite change, chills, fatigue, fever and unexpected weight change.   HENT: Negative for hearing loss.    Eyes: Negative for visual disturbance.   Respiratory: Negative for chest tightness and shortness of breath.    Cardiovascular: Negative for chest pain.   Gastrointestinal: Negative for abdominal pain, constipation, diarrhea, nausea and vomiting.   Genitourinary: Negative for difficulty urinating.   Musculoskeletal: Positive for arthralgias, back pain, gait problem, myalgias, neck pain and neck stiffness.   Skin: Negative for rash.   Neurological: Negative for dizziness, weakness, light-headedness, numbness and headaches.   Psychiatric/Behavioral:  "Positive for sleep disturbance. Negative for hallucinations and suicidal ideas. The patient is not nervous/anxious.        Past Medical History:   Diagnosis Date    Anxiety     Back pain     Chronic back pain     sylvester commented it has been "years" and is related to the "type of hard work" he has done all his life and that it is an ongoing pain he deals with daily     Colon polyp     Depressive disorder, not elsewhere classified 11/25/2014    Perirectal fistula     Seizures     "about 7 years ago, had 3 episodes and has not had a seizure in over 2 years" patient spoke in length related to not having a seizure disorder and felt unheard by many doctors when he described his events 7 years ago to his doctors       Past Surgical History:   Procedure Laterality Date    BACK SURGERY      2003/2004    CAUDAL EPIDURAL STEROID INJECTION N/A 10/10/2019    Procedure: Injection-steroid-epidural-caudal;  Surgeon: Amol Grady Jr., MD;  Location: [facility] OR;  Service: Pain Management;  Laterality: N/A;    CAUDAL EPIDURAL STEROID INJECTION N/A 07/16/2020    Procedure: Injection-steroid-epidural-caudal;  Surgeon: Amol Grady Jr., MD;  Location: [facility] OR;  Service: Pain Management;  Laterality: N/A;    CAUDAL EPIDURAL STEROID INJECTION N/A 03/03/2022    CAUDAL EPIDURAL STEROID INJECTION N/A 3/3/2022    Procedure: Injection-steroid-epidural-caudal;  Surgeon: Amol Grady Jr., MD;  Location: [facility] PAIN MGMT;  Service: Pain Management;  Laterality: N/A;    COLONOSCOPY N/A 08/22/2016    Procedure: COLONOSCOPY;  Surgeon: TROY Inman MD;  Location: [address]);  Service: Endoscopy;  Laterality: N/A;    EPIDURAL STEROID INJECTION N/A 03/18/2021    Procedure: Injection, Steroid, Epidural---T1-2;  Surgeon: Amol Grady Jr., MD;  Location: [facility] OR;  Service: Pain Management;  Laterality: N/A;    STEPHENIE  03/18/2021    EXAMINATION UNDER ANESTHESIA N/A 10/12/2018    Procedure: Exam under " anesthesia;  Surgeon: TROY Inman MD;  Location: [facility] OR 2ND FLR;  Service: Colon and Rectal;  Laterality: N/A;    fistula surgery      perirectal    HAND SURGERY Left     INSERTION OF SETON STITCH N/A 10/12/2018    Procedure: PLACEMENT, SETON STITCH;  Surgeon: TROY Inman MD;  Location: [facility] OR 2ND FLR;  Service: Colon and Rectal;  Laterality: N/A;    LEG SURGERY      plate    LEG SURGERY Right 1993       Social History     Socioeconomic History    Marital status: Single   Occupational History    Occupation: Maintenance with [employer]     Employer: [employer]    Tobacco Use    Smoking status: Current Every Day Smoker     Packs/day: 1.00     Years: 30.00     Pack years: 30.00     Types: Cigarettes    Smokeless tobacco: Never Used   Substance and Sexual Activity    Alcohol use: Yes     Comment: beer occasionally     Drug use: Yes     Types: Marijuana    Sexual activity: Yes       Review of patient's allergies indicates:   Allergen Reactions    Latex, natural rubber        Current Outpatient Medications on File Prior to Visit   Medication Sig Dispense Refill    [DISCONTINUED] gabapentin (NEURONTIN) 300 MG capsule Take 2 capsules (600 mg total) by mouth 3 (three) times daily. (Patient not taking: Reported on 3/22/2022) 180 capsule 11     Current Facility-Administered Medications on File Prior to Visit   Medication Dose Route Frequency Provider Last Rate Last Admin    0.9%  NaCl infusion   Intravenous Continuous Amol Grady Jr., MD   New Bag at 07/16/20 0843    dexAMETHasone sodium phos (PF) injection 10 mg  10 mg Epidural Once Amol Grady Jr., MD        fentaNYL injection 100 mcg  100 mcg Intravenous PRN Amol Grady Jr., MD   50 mcg at 07/16/20 0903    lidocaine (PF) 10 mg/ml (1%) injection 10 mg  1 mL Other Once Amol Grady Jr., MD        lidocaine HCL 20 mg/ml (2%) injection 10 mL  10 mL Other Once Amol Grady Jr., MD        midazolam  "(VERSED) 1 mg/mL injection 5 mg  5 mg Intravenous PRN Amol Grady Jr., MD   1 mg at 07/16/20 0900       Objective:      /88 (BP Location: Left arm, Patient Position: Sitting, BP Method: Medium (Automatic))   Pulse 87   Resp 16   Ht 6' 2" (1.88 m)   Wt 87.4 kg (192 lb 9.2 oz)   SpO2 100%   BMI 24.72 kg/m²     Exam:  GEN:  Well developed, well nourished.  Patient visibly uncomfortable throughout visit.  HEENT:  No trauma.  Mucous membranes moist.  Nares patent bilaterally.  PSYCH: Normal affect. Thought content appropriate.  CHEST:  Breathing symmetric.  No audible wheezing.  ABD: Soft, non-distended.  SKIN:  Warm, pink, dry.  No rash on exposed areas.    EXT:  No cyanosis, clubbing, or edema.  No color change or changes in nail or hair growth.  NEURO/MUSCULOSKELETAL:  Fully alert, oriented, and appropriate. Speech normal duc. No cranial nerve deficits.   Gait:  Normal.  No focal motor deficits.               Imaging:      Narrative & Impression    EXAMINATION:  XR LUMBAR SPINE AP AND LAT WITH FLEX/EXT     CLINICAL HISTORY:  Other intervertebral disc degeneration, lumbar region     TECHNIQUE:  AP and lateral views as well as lateral flexion and extension images are performed through the lumbar spine.     COMPARISON:  06/01/2020     FINDINGS:  Lumbar vertebral body heights are maintained.  Disc space narrowing L4-5 and L5-S1.  Posterior osteophyte L4-5.  Facet arthropathy L3-4 through L5-S1.  AP alignment is anatomic.     Impression:     No acute osseous abnormality seen.  Stable degenerative change lower lumbar spine.        Electronically signed by: Yaritza Henson  Date:                                            02/14/2022  Time:                                           13:30           Narrative & Impression    EXAMINATION:  MRI LUMBAR SPINE WITHOUT CONTRAST     CLINICAL HISTORY:  lumbar radiculopathy; Other intervertebral disc degeneration, lumbar region     TECHNIQUE:  Multiplanar, " multisequence MR images were acquired from the thoracolumbar junction to the sacrum without the administration of contrast.     COMPARISON:  09/12/2019     FINDINGS:  The marrow demonstrates homogeneous signal.  Vertebral body heights are maintained.  Disc space narrowing and endplate changes most pronounced L4-5 and L5-S1. Conus terminates appropriately at T12-L1.     Multilevel degenerative change as diesel below:     L1-2: Facet arthropathy with mild bilateral neural foraminal narrowing.     L2-3: Diffuse posterior broad-based disc bulge, ligamentum flavum hypertrophy, and facet hypertrophic changes contribute to moderate canal and moderate left neural foraminal narrowing.  There may be a superimposed central disc protrusion, decreased in size compared to prior.     L3-4: Diffuse posterior broad-based disc bulge, ligamentum flavum hypertrophy, and facet hypertrophic changes contribute to moderate-severe canal narrowing.  Severe left and moderate right neural foraminal narrowing.     L4-5: Diffuse posterior broad-based disc bulge, ligamentum flavum hypertrophy, and facet hypertrophic changes.  Previous right laminectomy.  No significant narrowing of the central canal.  Neural foraminal narrowing, right greater than left.     L5-S1: Small diffuse posterior broad-based disc bulge without significant canal narrowing.  Moderate bilateral neural foraminal narrowing.     Impression:     Multilevel degenerative change with no detrimental change compared to prior.     Continued canal narrowing L2-3 and L3-4.  Disc protrusion at L2-3 is decreased in mildly decreased in size with improved canal narrowing.     Multilevel neural foraminal narrowing, moderate to severe, as given in detail above.        Electronically signed by: Yaritza Henson  Date:                                            02/14/2022  Time:                                           15:02             Narrative     EXAMINATION:  MRI CERVICAL SPINE WITHOUT  CONTRAST    CLINICAL HISTORY:  cervical spinal stenosis;.  Other cervical disc degeneration, unspecified cervical region    TECHNIQUE:  Multiplanar, multisequence MR images of the cervical spine were acquired without the administration of contrast.    COMPARISON:  None.    FINDINGS:  Sagittal images demonstrate the vertebrae to be anatomically aligned.  There are normal in height and signal intensity without an acute marrow placement process or bone marrow edema.  There is no compression deformity.  There is desiccation and disc space narrowing to a moderate degree at C5-6 and to a lesser degree C6-7.  Cervical cord is remarkable for edematous changes at the C3-4 disc level and to a less significant degree at the C5-6 level.  There is no mass lesion or cord expansion.  The paraspinal soft tissues are unremarkable.    C2-3.  Axial images show eccentric disc bulging and osteophyte complex to the right side along with facet and uncovertebral joint disease causing moderate right-sided foraminal narrowing.  There is no spinal stenosis.    C3-4.  Broad-based left paracentral protrusion of the disc impresses on the cervical cord facet and uncovertebral joint disease and to the moderate left-sided canal and foraminal narrowing.  Cord edema is evident without mass.    C4-5.  Asymmetric left-sided canal and foraminal narrowing is appreciated secondary to protrusion of the disc and uncovertebral joint disease.  There is also noted mild/moderate right-sided foraminal narrowing secondary to facet arthropathy.  There is no cord edema.    C5-6.  Bilateral foraminal narrowing is present.  This is moderate to severe on the right side secondary to facet and uncovertebral joint disease.  It is mild to moderate on the left side.  Spinal stenosis is minimal.    C6-7.  Mild spinal stenosis is present along with bilateral foraminal narrowing due to disc bulging and hypertrophic facet disease.    C7-T1.  No spinal stenosis or foraminal  narrowing of significance.   Impression       1. Mild cord edema at the C3-4 level.  This is likely the result of recent trauma and moderate spinal stenosis.  There is a left paracentral disc protrusion along with facet arthropathy.  2. Moderate asymmetric left-sided spinal canal and foraminal narrowing at C4-5 secondary to disc protrusion and facet arthropathy.  3. Bilateral foraminal narrowing at C5-6 more significant on the right side.  4. Mild spinal stenosis and bilateral foraminal narrowing at C6-7.      Electronically signed by: Brijesh Car  Date: 11/29/2019  Time: 08:51    Encounter     View Encounter                             Narrative     EXAMINATION:  MRI LUMBAR SPINE WITHOUT CONTRAST    CLINICAL HISTORY:  evaluate for lumbar annular tear, history of lumbar surgery;.  Other specified postprocedural states    TECHNIQUE:  Sagittal and axial T1 and T2 W images    COMPARISON:  Correlation is made to lumbar spine series 02/09/2015.    FINDINGS:  Sagittal images demonstrate straightening of lumbar lordosis with minimal retrolisthesis L5 on S1.  Alignment otherwise anatomic.    Disc degenerative change including desiccation and/or decreased height T12-L1 and L2-3 through L5-S1 discs.  Small multilevel anterior osteophytes noted.    L2-3, bulging of the disc is present with superimposed left paracentral protrusion resulting in moderate flattening of the adjacent ventral thecal sac and overall mild degree of canal stenosis.  Mild bilateral foraminal narrowing is also present.    L3-4, bulging of the disc is present with associated spondylosis and results in moderate effacement of the thecal sac, moderate left and mild right foraminal narrowing and overall mild to moderate degree of canal stenosis.    L4-5, mild bulging of the disc and associated spondylosis are present with mild impression on ventral thecal sac and moderate bilateral foraminal narrowing.    L5-S1, bulging of the disc and associated spondylosis  are present with secondary fairly severe bilateral foraminal narrowing.    No disc herniation, canal compromise or foraminal compromise appreciated remaining lumbar or visualized lower thoracic disc levels.  The conus medullaris has a normal contour and signal.  Marrow degenerative change adjacent to the L5-S1 disc.   Impression       Straightening of lumbar lordosis.    L2-3, bulging of the disc with superimposed left paracentral protrusion combination of findings resulting in moderate effacement ventral subarachnoid space and mild canal stenosis along with mild foraminal narrowing..    L3-4, bulging of the disc and associated spondylosis with moderate effacement ventral thecal sac and resulting in mild to moderate foraminal narrowing and overall mild to moderate degree of canal stenosis.    L4-5, mild bulging of the disc and associated spondylosis with moderate bilateral foraminal narrowing.    L5-S1, bulging of the disc and associated spondylosis with severe foraminal narrowing.      Electronically signed by: Kathy Mitchell MD  Date: 09/12/2019  Time: 11:02         Assessment:       Encounter Diagnoses   Name Primary?    DDD (degenerative disc disease), lumbar Yes    Lumbar spondylosis     Lumbar radiculopathy          Plan:       Anmol was seen today for follow-up.    Diagnoses and all orders for this visit:    DDD (degenerative disc disease), lumbar    Lumbar spondylosis    Lumbar radiculopathy        Anmol Angeles  is a 51 y.o. male with acute exacerbation of chronic bilateral low back pain that radiates to the bilateral lower extremities.  He is known to have multilevel disc degeneration and multilevel foraminal stenosis with multilevel spinal stenosis as well.  Updated imaging does not show significant changes from previous studies.    1.  Patient doing well.  No further treatment needed at this time.  2.  Return to clinic as needed.  Can consider repeat caudal epidural steroid injection the  future.

## 2022-04-20 ENCOUNTER — TELEPHONE (OUTPATIENT)
Dept: NEUROLOGY | Facility: CLINIC | Age: 52
End: 2022-04-20
Payer: MEDICARE

## 2022-04-20 NOTE — TELEPHONE ENCOUNTER
----- Message from Heather Garcia sent at 4/20/2022  9:58 AM CDT -----  Contact: LIVIA VELAZQUEZ JR. [[id number]]  Type: Call Back      Who called: LIVIA VELAZQUEZ JR. [[id number]]      What is the request in detail: Patient is requesting a call back. He states that he would like to discuss getting scheduled for his procedure. He states that it was discussed in the past, but he is ready to move forward. He states that he is in pain and his appointment with Dr. Grady on yesterday was canceled. Please advise.       Can the clinic reply by [facility]? No      Would the patient rather a call back or a response via [facility]? Call back       Best call back number: [phone] (mobile)      Additional Information:

## 2022-04-26 ENCOUNTER — OFFICE VISIT (OUTPATIENT)
Dept: PAIN MEDICINE | Facility: CLINIC | Age: 52
End: 2022-04-26
Payer: MEDICARE

## 2022-04-26 ENCOUNTER — TELEPHONE (OUTPATIENT)
Dept: PAIN MEDICINE | Facility: CLINIC | Age: 52
End: 2022-04-26
Payer: MEDICARE

## 2022-04-26 ENCOUNTER — OFFICE VISIT (OUTPATIENT)
Dept: NEUROSURGERY | Facility: CLINIC | Age: 52
End: 2022-04-26
Payer: MEDICARE

## 2022-04-26 VITALS
SYSTOLIC BLOOD PRESSURE: 156 MMHG | DIASTOLIC BLOOD PRESSURE: 88 MMHG | BODY MASS INDEX: 24.84 KG/M2 | HEIGHT: 74 IN | HEART RATE: 93 BPM | OXYGEN SATURATION: 99 % | WEIGHT: 193.56 LBS

## 2022-04-26 DIAGNOSIS — M54.2 CERVICALGIA: ICD-10-CM

## 2022-04-26 DIAGNOSIS — M51.36 DDD (DEGENERATIVE DISC DISEASE), LUMBAR: ICD-10-CM

## 2022-04-26 DIAGNOSIS — M47.812 CERVICAL SPONDYLOSIS: ICD-10-CM

## 2022-04-26 DIAGNOSIS — M48.02 SPINAL STENOSIS, CERVICAL REGION: ICD-10-CM

## 2022-04-26 DIAGNOSIS — M54.16 LUMBAR RADICULOPATHY: ICD-10-CM

## 2022-04-26 DIAGNOSIS — M50.30 DDD (DEGENERATIVE DISC DISEASE), CERVICAL: ICD-10-CM

## 2022-04-26 DIAGNOSIS — M47.816 LUMBAR SPONDYLOSIS: Primary | ICD-10-CM

## 2022-04-26 DIAGNOSIS — M48.02 CERVICAL SPINAL STENOSIS: ICD-10-CM

## 2022-04-26 PROCEDURE — 1160F PR REVIEW ALL MEDS BY PRESCRIBER/CLIN PHARMACIST DOCUMENTED: ICD-10-PCS | Mod: CPTII,S$GLB,, | Performed by: PHYSICIAN ASSISTANT

## 2022-04-26 PROCEDURE — 3077F SYST BP >= 140 MM HG: CPT | Mod: CPTII,S$GLB,, | Performed by: PHYSICIAN ASSISTANT

## 2022-04-26 PROCEDURE — 3008F BODY MASS INDEX DOCD: CPT | Mod: CPTII,S$GLB,, | Performed by: PHYSICIAN ASSISTANT

## 2022-04-26 PROCEDURE — 1159F PR MEDICATION LIST DOCUMENTED IN MEDICAL RECORD: ICD-10-PCS | Mod: CPTII,95,, | Performed by: PAIN MEDICINE

## 2022-04-26 PROCEDURE — 99999 PR PBB SHADOW E&M-EST. PATIENT-LVL IV: ICD-10-PCS | Mod: PBBFAC,,, | Performed by: PHYSICIAN ASSISTANT

## 2022-04-26 PROCEDURE — 99999 PR PBB SHADOW E&M-EST. PATIENT-LVL IV: CPT | Mod: PBBFAC,,, | Performed by: PHYSICIAN ASSISTANT

## 2022-04-26 PROCEDURE — 1159F MED LIST DOCD IN RCRD: CPT | Mod: CPTII,95,, | Performed by: PAIN MEDICINE

## 2022-04-26 PROCEDURE — 99441 PR PHYSICIAN TELEPHONE EVALUATION 5-10 MIN: CPT | Mod: 95,,, | Performed by: PAIN MEDICINE

## 2022-04-26 PROCEDURE — 1160F RVW MEDS BY RX/DR IN RCRD: CPT | Mod: CPTII,S$GLB,, | Performed by: PHYSICIAN ASSISTANT

## 2022-04-26 PROCEDURE — 99214 PR OFFICE/OUTPT VISIT, EST, LEVL IV, 30-39 MIN: ICD-10-PCS | Mod: S$GLB,,, | Performed by: PHYSICIAN ASSISTANT

## 2022-04-26 PROCEDURE — 3079F DIAST BP 80-89 MM HG: CPT | Mod: CPTII,S$GLB,, | Performed by: PHYSICIAN ASSISTANT

## 2022-04-26 PROCEDURE — 3077F PR MOST RECENT SYSTOLIC BLOOD PRESSURE >= 140 MM HG: ICD-10-PCS | Mod: CPTII,S$GLB,, | Performed by: PHYSICIAN ASSISTANT

## 2022-04-26 PROCEDURE — 3079F PR MOST RECENT DIASTOLIC BLOOD PRESSURE 80-89 MM HG: ICD-10-PCS | Mod: CPTII,S$GLB,, | Performed by: PHYSICIAN ASSISTANT

## 2022-04-26 PROCEDURE — 1159F PR MEDICATION LIST DOCUMENTED IN MEDICAL RECORD: ICD-10-PCS | Mod: CPTII,S$GLB,, | Performed by: PHYSICIAN ASSISTANT

## 2022-04-26 PROCEDURE — 1160F RVW MEDS BY RX/DR IN RCRD: CPT | Mod: CPTII,95,, | Performed by: PAIN MEDICINE

## 2022-04-26 PROCEDURE — 3008F PR BODY MASS INDEX (BMI) DOCUMENTED: ICD-10-PCS | Mod: CPTII,S$GLB,, | Performed by: PHYSICIAN ASSISTANT

## 2022-04-26 PROCEDURE — 1160F PR REVIEW ALL MEDS BY PRESCRIBER/CLIN PHARMACIST DOCUMENTED: ICD-10-PCS | Mod: CPTII,95,, | Performed by: PAIN MEDICINE

## 2022-04-26 PROCEDURE — 99441 PR PHYSICIAN TELEPHONE EVALUATION 5-10 MIN: ICD-10-PCS | Mod: 95,,, | Performed by: PAIN MEDICINE

## 2022-04-26 PROCEDURE — 1159F MED LIST DOCD IN RCRD: CPT | Mod: CPTII,S$GLB,, | Performed by: PHYSICIAN ASSISTANT

## 2022-04-26 PROCEDURE — 99214 OFFICE O/P EST MOD 30 MIN: CPT | Mod: S$GLB,,, | Performed by: PHYSICIAN ASSISTANT

## 2022-04-26 NOTE — PROGRESS NOTES
[facility]    HPI:  Anmol Angeles Jr. is a 51-year-old male who is known to Neurosurgery and last seen in September 2020 by Dr. Amin.  He has known moderate to severe cervical stenosis with mild symptoms of myelopathy and known lumbar spondylosis.  He has been managed with STEPHENIE with generally good results, but he presents today due to severe back and leg pain 2 weeks ago that left him essentially bed-bound for 5 days.  He would like to discuss lumbar surgery and understands that he may require cervical fusion prior to back surgery due to the risk of spinal cord injury during prolonged prone positioning.    Generally his low back pain has radiated down his left > right legs and was described as a pressure or aching.  He underwent STEPHENIE 6 weeks ago which significantly reduced his pain.  He was doing his laundry weeks ago when he suddenly had severe b/l leg pain that caused him to fall to the ground be unable to stand.  He denies numbness in his legs, but does have intermittent numbness in his b/l hands as well as significant cramping to his hands where he must manually opened them to release 3rd objects.  He has been dropping his cigarettes lately.  He denies b/b dysfunction and saddle anesthesia.  Denies severe neck pain but does endorse minor aches and pains periodically in his neck.  Denies gait disturbance.           Review of patient's allergies indicates:   Allergen Reactions    Latex, natural rubber                   Past Medical History:   Diagnosis Date    Anxiety      Back pain      Depressive disorder, not elsewhere classified 11/25/2014    Perirectal fistula      Seizures                  Past Surgical History:   Procedure Laterality Date    BACK SURGERY        CAUDAL EPIDURAL STEROID INJECTION N/A 10/10/2019     Procedure: Injection-steroid-epidural-caudal;  Surgeon: Amol Grady Jr., MD;  Location: [facility];  Service: Pain Management;  Laterality: N/A;    COLONOSCOPY N/A  8/22/2016     Procedure: COLONOSCOPY;  Surgeon: TROY Inman MD;  Location: [facility] ENDO (4TH FLR);  Service: Endoscopy;  Laterality: N/A;    EXAMINATION UNDER ANESTHESIA N/A 10/12/2018     Procedure: Exam under anesthesia;  Surgeon: TROY Inman MD;  Location: [facility] OR 2ND FLR;  Service: Colon and Rectal;  Laterality: N/A;    fistula surgery        HAND SURGERY Left      INSERTION OF SETON STITCH N/A 10/12/2018     Procedure: PLACEMENT, SETON STITCH;  Surgeon: TROY Inman MD;  Location: [facility] OR 2ND FLR;  Service: Colon and Rectal;  Laterality: N/A;    LEG SURGERY         plate    LEG SURGERY Right        History reviewed. No pertinent family history.  Social History                Tobacco Use    Smoking status: Current Every Day Smoker       Packs/day: 1.50       Years: 30.00       Pack years: 45.00       Types: Cigarettes    Smokeless tobacco: Never Used   Substance Use Topics    Alcohol use: Yes       Comment: beer occasionally     Drug use: Yes       Types: Marijuana         Review of Systems   As noted in HPI        OBJECTIVE:     Vitals:    04/26/22 1343   BP: (!) 156/88   Pulse: 93       Physical Exam:General: well developed, well nourished, no distress  Neurologic: Alert and oriented. Thought content appropriate.  GCS: Motor: 6/Verbal: 5/Eyes: 4 GCS Total: 15  Cranial nerves: II-XII grossly intact  Neck: supple, without obvious masses or lesions  Skin: grossly intact in all 4 extremities without obvious rashes or lesions    Gait - normal    Motor Strength: No focal numbness or weakness  Strength  Deltoids Triceps Biceps Wrist Extension Wrist Flexion Hand    Upper: R 5/5 5/5 5/5 5/5 5/5 5/5    L 5/5 5/5 5/5 5/5 5/5 5/5     Iliopsoas Quadriceps Knee  Flexion Tibialis  anterior Gastro- cnemius EHL   Lower: R 5/5 5/5 5/5 5/5 5/5 5/5    L 5/5 5/5 5/5 5/5 5/5 5/5   Sensory: intact to light touch B/L UE and LE  DTR's - 2 + and symmetric in brachioradialis  Chris's - Negative              Ankle Clonus - Negative             Diagnostic Results:  All imaging was independently reviewed by me.     Lumbar MRI 2/24/22  Multilevel degenerative change with no detrimental change compared to prior.     Continued canal narrowing L2-3 and L3-4.  Disc protrusion at L2-3 is decreased in mildly decreased in size with improved canal narrowing.     Multilevel neural foraminal narrowing, moderate to severe, as given in detail above.    Upright lumbar x-rays with flexion and extension views 02/14/2022  Lumbar vertebral body heights are maintained.  Disc space narrowing L4-5 and L5-S1.  Posterior osteophyte L4-5.  Facet arthropathy L3-4 through L5-S1.  AP alignment is anatomic.    Cervical MRI, 11/29/19  1. Moderate CS C3-4 with myelomalacia  2. Moderate severe LRS at C4-5  3, Bilateral NFS at C5-6  4. Mild-moderate CS at C6-7    Lumbar MRI, 9/12/20:  1. Diffuse DDD with straightening of lordosis  2. L2-3 mod L LRS 2/2 HNP, mild bilat NFS  3. L3-4 mild-mod CS, mod L NFS and mild-mod R NFS  4. L4-5 mild CS, mild-mod L NFS, mod R NFS  5. L5-S1 mild CS, mod R NFS, severe L NFS      CT C spine, dated 11/14/19:  1. Diffuse degen changes        ASSESSMENT/PLAN:     Problem List Items Addressed This Visit    None     Visit Diagnoses     Cervicalgia        Relevant Orders    MRI Cervical Spine Without Contrast    X-Ray Cervical Spine 5 View W Flex Extxt    Spinal stenosis, cervical region        Relevant Orders    MRI Cervical Spine Without Contrast    X-Ray Cervical Spine 5 View W Flex Extxt        VISIT SUMMARY:  Anmol Angeles . was last seen by Neurosurgery in 2019 and has known cervical stenosis and lumbar spondylitic changes.  He would like to discuss lumbar surgery due to return of severe pain in his back and b/l legs.  He has no objective findings of myelopathy today, but he does report numbness in his hands intermittently as well as dropping items from his hands, most notably cigarettes.  I will order updated  cervical imaging including MRI and x-rays.  Depending on severity of his cervical stenosis, he may require cervical fusion prior to lumbar laminectomy.  Follow-up with Dr. Amin after imaging is complete.saw Garces PA-C  [facility]  Department of Neurosurgery  [phone]

## 2022-04-26 NOTE — TELEPHONE ENCOUNTER
Patient requested a virtual then changed his mind and requested an audio visit. As per Dr Grady this is okay to change appt to virtual audio. Nothing further discussed.

## 2022-04-26 NOTE — TELEPHONE ENCOUNTER
----- Message from Alana Connell sent at 4/26/2022  8:46 AM CDT -----  Regarding: speak with nurse  Contact: patient  [phone]    please call patient would like to change appointment to virtual if possible waiting on a call back from the nurse thanks.

## 2022-04-26 NOTE — PROGRESS NOTES
Subjective:     Patient ID: Anmol Angeles Jr. is a 51 y.o. male    Chief Complaint: Follow-up      Referred by: No ref. provider found      HPI:     Interval History (4/26/22):    The patient location is:  home  The chief complaint leading to consultation is:  Follow-up  Visit type: Virtual visit with audio.  Patient verbally consented for audio visit.  Total time spent with patient:  10 minutes  Each patient to whom he or she provides medical services by telemedicine is:  (1) informed of the relationship between the physician and patient and the respective role of any other health care provider with respect to management of the patient; and (2) notified that he or she may decline to receive medical services by telemedicine and may withdraw from such care at any time.      He returns today for follow up.  He reports that he was recently doing laundry and had a sudden exacerbation of low back and lower extremity pain.  States the pain is somewhat improved, but still having significant pain.  States he is still has difficulty working and performing ADLs.  He is scheduled to have a follow-up visit with Neurosurgery later today.       Interval History (3/22/22):  He returns today for follow up.  He reports that caudal epidural steroid injection has been helpful for the low back and lower extremity pain.  He reports roughly 80% relief following this procedure.  He is happy with these results and does not feel that further treatment is needed at this time.      Interval History (2/15/22):  He returns today for follow up and imaging review.  He reports that his pain is unchanged in quality location since last encounter.  He denies any new worsening symptoms.     Interval History (2/3/22):  He returns today for follow up.  He reports that he is having acute low back pain.  States the with lifting a door 4 days ago and felt a sudden onset of low back pain.  The pain is located generally throughout the lumbar spine.  He  denies any definitive pain radiating to lower extremities but does have some pain in his legs especially early in the morning.  He denies any new numbness, tingling, weakness, bowel bladder dysfunction.  He has difficulty sitting or lying down due to the pain.      Interval History (3/30/21):  He returns today for follow up.  He reports that T1-2 interlaminar epidural steroid injection has been helpful for the neck pain and vertigo.  He reports 100% relief of his vertigo symptoms.  He reports very significant improvement as bilateral neck pain.  He still has some stiffness and pain but otherwise is doing very well.  He does not feel that further treatment is needed at this time.      Interval History (2/25/21):  He returns today for follow up.  He reports that repeat caudal epidural steroid injection did provide very significant relief of his low back pain.  He states that he is beginning to have recurrent low back pain at this time.  More bothersome at this time however is his significant neck and upper extremity pain.  He has been evaluated by Neurosurgery who recommended further interventional procedures prior to considering cervical fusion.  Patient also reports having significant vertigo symptoms are quite bothersome.  Otherwise he denies any new numbness, tingling, weakness, bowel bladder dysfunction.  He denies any significant changes in the quality or distribution of his pain.       Interval History (12/12/19):  He returns today for follow up and MRI review.  He reports that his symptoms have been unchanged since last encounter.  He denies any new or worsening symptoms.  He has an appointment with neurosurgery on 12/16/19.      Interval History (11/25/19):  He returns today for follow up.  He reports that caudal epidural steroid injection has not been helpful for the low back pain.  He denies any changes in the quality or location was low back pain.  He attended physical therapy for his low back during of  his sessions began to have some mild neck pain. This pain developed into severe neck pain and stiffness with associated vertigo.  Patient was evaluated emergency room.  Patient states pain is interfering with his sleep.  He denies any numbness, tingling, weakness, bowel bladder dysfunction.  He states that he has taken Robaxin and this medicine help with his pain and help him to relax of aching sleep.      Initial Encounter (10/1/19):  Anmol Angeles Jr. is a 51 y.o. male who presents today with chronic bilateral low back pain that radiates the right lower extremity.  This pain has been present for many years.  No specific inciting event or injury noted.  Patient is status post low back surgery before 2005 for this issue.  He states that this surgery mostly help with his pain except for an occasional flare up that would last for about 3-4 days.  His current episode of pain has lasted for 2-3 months and involves left-sided low back and lower extremity.  He also reports some tightness in the leg.  Pain is constant worse with activity.  Pain is also worse with initiating activity after rest.  He reports diffuse numbness and tingling throughout his body.  He denies any weakness.  He denies any bowel or bladder dysfunction.    Physical Therapy:  Yes.    Non-pharmacologic Treatment:  Rest helps         · TENS?  No    Pain Medications:         · Currently taking:  Ibuprofen    · Has tried in the past:  Medrol Dosepak, Flexeril, Robaxin, Norco    · Has not tried:  TCAs, SNRIs, anticonvulsants, topical creams    Blood thinners:  None    Interventional Therapies:    10/10/19 - caudal epidural steroid injection - no relief noted  7/16/20 - caudal epidural steroid injection - significant relief for at least 6 months  3/18/21 - T1-2 interlaminar epidural steroid injection - 100% relief of vertigo and very significant relief of neck pain  3/3/22 - caudal epidural steroid injection - 80% relief    Relevant Surgeries:  Previous  "low back surgery before 2005    Affecting sleep?  Yes    Affecting daily activities? yes    Depressive symptoms? yes          · SI/HI? No    Work status: Disabled    Pain Scores:    Best:       6/10  Worst:    10/10  Usually:   7/10  Today:     9/10    Review of Systems   Constitutional: Negative for activity change, appetite change, chills, fatigue, fever and unexpected weight change.   HENT: Negative for hearing loss.    Eyes: Negative for visual disturbance.   Respiratory: Negative for chest tightness and shortness of breath.    Cardiovascular: Negative for chest pain.   Gastrointestinal: Negative for abdominal pain, constipation, diarrhea, nausea and vomiting.   Genitourinary: Negative for difficulty urinating.   Musculoskeletal: Positive for arthralgias, back pain, gait problem, myalgias, neck pain and neck stiffness.   Skin: Negative for rash.   Neurological: Negative for dizziness, weakness, light-headedness, numbness and headaches.   Psychiatric/Behavioral: Positive for sleep disturbance. Negative for hallucinations and suicidal ideas. The patient is not nervous/anxious.        Past Medical History:   Diagnosis Date    Anxiety     Back pain     Chronic back pain     levitent commented it has been "years" and is related to the "type of hard work" he has done all his life and that it is an ongoing pain he deals with daily     Colon polyp     Depressive disorder, not elsewhere classified 11/25/2014    Perirectal fistula     Seizures     "about 7 years ago, had 3 episodes and has not had a seizure in over 2 years" patient spoke in length related to not having a seizure disorder and felt unheard by many doctors when he described his events 7 years ago to his doctors       Past Surgical History:   Procedure Laterality Date    BACK SURGERY      2003/2004    CAUDAL EPIDURAL STEROID INJECTION N/A 10/10/2019    Procedure: Injection-steroid-epidural-caudal;  Surgeon: Amol Grady Jr., MD;  [facility]" OR;  Service: Pain Management;  Laterality: N/A;    CAUDAL EPIDURAL STEROID INJECTION N/A 07/16/2020    Procedure: Injection-steroid-epidural-caudal;  Surgeon: Amol Grady Jr., MD;  Location: [facility] OR;  Service: Pain Management;  Laterality: N/A;    CAUDAL EPIDURAL STEROID INJECTION N/A 03/03/2022    CAUDAL EPIDURAL STEROID INJECTION N/A 3/3/2022    Procedure: Injection-steroid-epidural-caudal;  Surgeon: Amol Grady Jr., MD;  Location: [facility] PAIN MGMT;  Service: Pain Management;  Laterality: N/A;    COLONOSCOPY N/A 08/22/2016    Procedure: COLONOSCOPY;  Surgeon: TROY Inman MD;  Location: [facility] ENDO (4TH FLR);  Service: Endoscopy;  Laterality: N/A;    EPIDURAL STEROID INJECTION N/A 03/18/2021    Procedure: Injection, Steroid, Epidural---T1-2;  Surgeon: Amol Grady Jr., MD;  Location: [facility] OR;  Service: Pain Management;  Laterality: N/A;    STEPHENIE  03/18/2021    EXAMINATION UNDER ANESTHESIA N/A 10/12/2018    Procedure: Exam under anesthesia;  Surgeon: TROY Inman MD;  Location: [facility] OR 2ND FLR;  Service: Colon and Rectal;  Laterality: N/A;    fistula surgery      perirectal    HAND SURGERY Left     INSERTION OF SETON STITCH N/A 10/12/2018    Procedure: PLACEMENT, SETON STITCH;  Surgeon: TROY Inman MD;  Location: [facility] OR 2ND FLR;  Service: Colon and Rectal;  Laterality: N/A;    LEG SURGERY      plate    LEG SURGERY Right 1993       Social History     Socioeconomic History    Marital status: Single   Occupational History    Occupation: Maintenance with [employer]     Employer: [employer]    Tobacco Use    Smoking status: Current Every Day Smoker     Packs/day: 1.00     Years: 30.00     Pack years: 30.00     Types: Cigarettes    Smokeless tobacco: Never Used   Substance and Sexual Activity    Alcohol use: Yes     Comment: beer occasionally     Drug use: Yes     Types: Marijuana    Sexual activity: Yes       Review of patient's allergies indicates:   Allergen  Reactions    Latex, natural rubber        No current outpatient medications on file prior to visit.     Current Facility-Administered Medications on File Prior to Visit   Medication Dose Route Frequency Provider Last Rate Last Admin    0.9%  NaCl infusion   Intravenous Continuous Amol Grady Jr., MD   New Bag at 07/16/20 0843    dexAMETHasone sodium phos (PF) injection 10 mg  10 mg Epidural Once Amol Grady Jr., MD        fentaNYL injection 100 mcg  100 mcg Intravenous PRN Amol Grady Jr., MD   50 mcg at 07/16/20 0903    lidocaine (PF) 10 mg/ml (1%) injection 10 mg  1 mL Other Once Amol Grady Jr., MD        lidocaine HCL 20 mg/ml (2%) injection 10 mL  10 mL Other Once Amol Grady Jr., MD        midazolam (VERSED) 1 mg/mL injection 5 mg  5 mg Intravenous PRN Amol Grady Jr., MD   1 mg at 07/16/20 0900       Objective:      There were no vitals taken for this visit.    Exam:  GEN:  Well developed, well nourished.  Patient visibly uncomfortable throughout visit.  HEENT:  No trauma.  Mucous membranes moist.  Nares patent bilaterally.  PSYCH: Normal affect. Thought content appropriate.  CHEST:  Breathing symmetric.  No audible wheezing.  ABD: Soft, non-distended.  SKIN:  Warm, pink, dry.  No rash on exposed areas.    EXT:  No cyanosis, clubbing, or edema.  No color change or changes in nail or hair growth.  NEURO/MUSCULOSKELETAL:  Fully alert, oriented, and appropriate. Speech normal duc. No cranial nerve deficits.   Gait:  Normal.  No focal motor deficits.               Imaging:      Narrative & Impression    EXAMINATION:  XR LUMBAR SPINE AP AND LAT WITH FLEX/EXT     CLINICAL HISTORY:  Other intervertebral disc degeneration, lumbar region     TECHNIQUE:  AP and lateral views as well as lateral flexion and extension images are performed through the lumbar spine.     COMPARISON:  06/01/2020     FINDINGS:  Lumbar vertebral body heights are maintained.  Disc space  narrowing L4-5 and L5-S1.  Posterior osteophyte L4-5.  Facet arthropathy L3-4 through L5-S1.  AP alignment is anatomic.     Impression:     No acute osseous abnormality seen.  Stable degenerative change lower lumbar spine.        Electronically signed by: Yaritza Henson  Date:                                            02/14/2022  Time:                                           13:30           Narrative & Impression    EXAMINATION:  MRI LUMBAR SPINE WITHOUT CONTRAST     CLINICAL HISTORY:  lumbar radiculopathy; Other intervertebral disc degeneration, lumbar region     TECHNIQUE:  Multiplanar, multisequence MR images were acquired from the thoracolumbar junction to the sacrum without the administration of contrast.     COMPARISON:  09/12/2019     FINDINGS:  The marrow demonstrates homogeneous signal.  Vertebral body heights are maintained.  Disc space narrowing and endplate changes most pronounced L4-5 and L5-S1. Conus terminates appropriately at T12-L1.     Multilevel degenerative change as diesel below:     L1-2: Facet arthropathy with mild bilateral neural foraminal narrowing.     L2-3: Diffuse posterior broad-based disc bulge, ligamentum flavum hypertrophy, and facet hypertrophic changes contribute to moderate canal and moderate left neural foraminal narrowing.  There may be a superimposed central disc protrusion, decreased in size compared to prior.     L3-4: Diffuse posterior broad-based disc bulge, ligamentum flavum hypertrophy, and facet hypertrophic changes contribute to moderate-severe canal narrowing.  Severe left and moderate right neural foraminal narrowing.     L4-5: Diffuse posterior broad-based disc bulge, ligamentum flavum hypertrophy, and facet hypertrophic changes.  Previous right laminectomy.  No significant narrowing of the central canal.  Neural foraminal narrowing, right greater than left.     L5-S1: Small diffuse posterior broad-based disc bulge without significant canal narrowing.  Moderate  bilateral neural foraminal narrowing.     Impression:     Multilevel degenerative change with no detrimental change compared to prior.     Continued canal narrowing L2-3 and L3-4.  Disc protrusion at L2-3 is decreased in mildly decreased in size with improved canal narrowing.     Multilevel neural foraminal narrowing, moderate to severe, as given in detail above.        Electronically signed by: Yaritza Henson  Date:                                            02/14/2022  Time:                                           15:02             Narrative     EXAMINATION:  MRI CERVICAL SPINE WITHOUT CONTRAST    CLINICAL HISTORY:  cervical spinal stenosis;.  Other cervical disc degeneration, unspecified cervical region    TECHNIQUE:  Multiplanar, multisequence MR images of the cervical spine were acquired without the administration of contrast.    COMPARISON:  None.    FINDINGS:  Sagittal images demonstrate the vertebrae to be anatomically aligned.  There are normal in height and signal intensity without an acute marrow placement process or bone marrow edema.  There is no compression deformity.  There is desiccation and disc space narrowing to a moderate degree at C5-6 and to a lesser degree C6-7.  Cervical cord is remarkable for edematous changes at the C3-4 disc level and to a less significant degree at the C5-6 level.  There is no mass lesion or cord expansion.  The paraspinal soft tissues are unremarkable.    C2-3.  Axial images show eccentric disc bulging and osteophyte complex to the right side along with facet and uncovertebral joint disease causing moderate right-sided foraminal narrowing.  There is no spinal stenosis.    C3-4.  Broad-based left paracentral protrusion of the disc impresses on the cervical cord facet and uncovertebral joint disease and to the moderate left-sided canal and foraminal narrowing.  Cord edema is evident without mass.    C4-5.  Asymmetric left-sided canal and foraminal narrowing is  appreciated secondary to protrusion of the disc and uncovertebral joint disease.  There is also noted mild/moderate right-sided foraminal narrowing secondary to facet arthropathy.  There is no cord edema.    C5-6.  Bilateral foraminal narrowing is present.  This is moderate to severe on the right side secondary to facet and uncovertebral joint disease.  It is mild to moderate on the left side.  Spinal stenosis is minimal.    C6-7.  Mild spinal stenosis is present along with bilateral foraminal narrowing due to disc bulging and hypertrophic facet disease.    C7-T1.  No spinal stenosis or foraminal narrowing of significance.   Impression       1. Mild cord edema at the C3-4 level.  This is likely the result of recent trauma and moderate spinal stenosis.  There is a left paracentral disc protrusion along with facet arthropathy.  2. Moderate asymmetric left-sided spinal canal and foraminal narrowing at C4-5 secondary to disc protrusion and facet arthropathy.  3. Bilateral foraminal narrowing at C5-6 more significant on the right side.  4. Mild spinal stenosis and bilateral foraminal narrowing at C6-7.      Electronically signed by: Brijesh Car  Date: 11/29/2019  Time: 08:51    Encounter     View Encounter                             Narrative     EXAMINATION:  MRI LUMBAR SPINE WITHOUT CONTRAST    CLINICAL HISTORY:  evaluate for lumbar annular tear, history of lumbar surgery;.  Other specified postprocedural states    TECHNIQUE:  Sagittal and axial T1 and T2 W images    COMPARISON:  Correlation is made to lumbar spine series 02/09/2015.    FINDINGS:  Sagittal images demonstrate straightening of lumbar lordosis with minimal retrolisthesis L5 on S1.  Alignment otherwise anatomic.    Disc degenerative change including desiccation and/or decreased height T12-L1 and L2-3 through L5-S1 discs.  Small multilevel anterior osteophytes noted.    L2-3, bulging of the disc is present with superimposed left paracentral protrusion  resulting in moderate flattening of the adjacent ventral thecal sac and overall mild degree of canal stenosis.  Mild bilateral foraminal narrowing is also present.    L3-4, bulging of the disc is present with associated spondylosis and results in moderate effacement of the thecal sac, moderate left and mild right foraminal narrowing and overall mild to moderate degree of canal stenosis.    L4-5, mild bulging of the disc and associated spondylosis are present with mild impression on ventral thecal sac and moderate bilateral foraminal narrowing.    L5-S1, bulging of the disc and associated spondylosis are present with secondary fairly severe bilateral foraminal narrowing.    No disc herniation, canal compromise or foraminal compromise appreciated remaining lumbar or visualized lower thoracic disc levels.  The conus medullaris has a normal contour and signal.  Marrow degenerative change adjacent to the L5-S1 disc.   Impression       Straightening of lumbar lordosis.    L2-3, bulging of the disc with superimposed left paracentral protrusion combination of findings resulting in moderate effacement ventral subarachnoid space and mild canal stenosis along with mild foraminal narrowing..    L3-4, bulging of the disc and associated spondylosis with moderate effacement ventral thecal sac and resulting in mild to moderate foraminal narrowing and overall mild to moderate degree of canal stenosis.    L4-5, mild bulging of the disc and associated spondylosis with moderate bilateral foraminal narrowing.    L5-S1, bulging of the disc and associated spondylosis with severe foraminal narrowing.      Electronically signed by: Kathy Mitchell MD  Date: 09/12/2019  Time: 11:02         Assessment:       Encounter Diagnoses   Name Primary?    Lumbar spondylosis Yes    Lumbar radiculopathy     DDD (degenerative disc disease), lumbar     DDD (degenerative disc disease), cervical     Cervical spondylosis     Cervical spinal stenosis           Plan:       Anmol was seen today for follow-up.    Diagnoses and all orders for this visit:    Lumbar spondylosis    Lumbar radiculopathy    DDD (degenerative disc disease), lumbar    DDD (degenerative disc disease), cervical    Cervical spondylosis    Cervical spinal stenosis        Anmol Angeles Jr. is a 51 y.o. male with acute exacerbation of chronic bilateral low back pain that radiates to the bilateral lower extremities.  He is known to have multilevel disc degeneration and multilevel foraminal stenosis with multilevel spinal stenosis as well.  Updated imaging does not show significant changes from previous studies.    1.  Proceed with Neurosurgery appointment as planned.  2.  Return to clinic as needed.  May consider repeat injections in the future, however patient has undergone multiple epidural steroid injections (most recently in March of 2022) and still having frequent severe exacerbations of pain.. I suspect patient may need surgical correction.

## 2022-05-12 ENCOUNTER — HOSPITAL ENCOUNTER (OUTPATIENT)
Dept: RADIOLOGY | Facility: HOSPITAL | Age: 52
Discharge: HOME | End: 2022-05-12
Attending: PHYSICIAN ASSISTANT
Payer: MEDICARE

## 2022-05-12 DIAGNOSIS — M54.2 CERVICALGIA: ICD-10-CM

## 2022-05-12 DIAGNOSIS — M48.02 SPINAL STENOSIS, CERVICAL REGION: ICD-10-CM

## 2022-05-12 PROCEDURE — 72141 MRI CERVICAL SPINE WITHOUT CONTRAST: ICD-10-PCS | Mod: 26,,, | Performed by: RADIOLOGY

## 2022-05-12 PROCEDURE — 72141 MRI NECK SPINE W/O DYE: CPT | Mod: TC

## 2022-05-12 PROCEDURE — 72052 XR CERVICAL SPINE 5 VIEW WITH FLEX AND EXT: ICD-10-PCS | Mod: 26,,, | Performed by: RADIOLOGY

## 2022-05-12 PROCEDURE — 72052 X-RAY EXAM NECK SPINE 6/>VWS: CPT | Mod: 26,,, | Performed by: RADIOLOGY

## 2022-05-12 PROCEDURE — 72052 X-RAY EXAM NECK SPINE 6/>VWS: CPT | Mod: TC,FY

## 2022-05-12 PROCEDURE — 72141 MRI NECK SPINE W/O DYE: CPT | Mod: 26,,, | Performed by: RADIOLOGY

## 2022-05-18 ENCOUNTER — OFFICE VISIT (OUTPATIENT)
Dept: NEUROSURGERY | Facility: CLINIC | Age: 52
End: 2022-05-18
Payer: MEDICARE

## 2022-05-18 VITALS
DIASTOLIC BLOOD PRESSURE: 83 MMHG | BODY MASS INDEX: 25.04 KG/M2 | HEIGHT: 74 IN | SYSTOLIC BLOOD PRESSURE: 147 MMHG | HEART RATE: 79 BPM | WEIGHT: 195.13 LBS

## 2022-05-18 DIAGNOSIS — G89.29 CHRONIC BILATERAL LOW BACK PAIN, UNSPECIFIED WHETHER SCIATICA PRESENT: Primary | ICD-10-CM

## 2022-05-18 DIAGNOSIS — G89.29 CHRONIC NECK PAIN: ICD-10-CM

## 2022-05-18 DIAGNOSIS — M54.2 CHRONIC NECK PAIN: ICD-10-CM

## 2022-05-18 DIAGNOSIS — M47.812 CERVICAL SPONDYLOSIS: ICD-10-CM

## 2022-05-18 DIAGNOSIS — M54.50 CHRONIC BILATERAL LOW BACK PAIN, UNSPECIFIED WHETHER SCIATICA PRESENT: Primary | ICD-10-CM

## 2022-05-18 PROCEDURE — 3079F PR MOST RECENT DIASTOLIC BLOOD PRESSURE 80-89 MM HG: ICD-10-PCS | Mod: CPTII,S$GLB,, | Performed by: NEUROLOGICAL SURGERY

## 2022-05-18 PROCEDURE — 99999 PR PBB SHADOW E&M-EST. PATIENT-LVL III: ICD-10-PCS | Mod: PBBFAC,,, | Performed by: NEUROLOGICAL SURGERY

## 2022-05-18 PROCEDURE — 1159F PR MEDICATION LIST DOCUMENTED IN MEDICAL RECORD: ICD-10-PCS | Mod: CPTII,S$GLB,, | Performed by: NEUROLOGICAL SURGERY

## 2022-05-18 PROCEDURE — 1159F MED LIST DOCD IN RCRD: CPT | Mod: CPTII,S$GLB,, | Performed by: NEUROLOGICAL SURGERY

## 2022-05-18 PROCEDURE — 1160F PR REVIEW ALL MEDS BY PRESCRIBER/CLIN PHARMACIST DOCUMENTED: ICD-10-PCS | Mod: CPTII,S$GLB,, | Performed by: NEUROLOGICAL SURGERY

## 2022-05-18 PROCEDURE — 3008F BODY MASS INDEX DOCD: CPT | Mod: CPTII,S$GLB,, | Performed by: NEUROLOGICAL SURGERY

## 2022-05-18 PROCEDURE — 1160F RVW MEDS BY RX/DR IN RCRD: CPT | Mod: CPTII,S$GLB,, | Performed by: NEUROLOGICAL SURGERY

## 2022-05-18 PROCEDURE — 3077F PR MOST RECENT SYSTOLIC BLOOD PRESSURE >= 140 MM HG: ICD-10-PCS | Mod: CPTII,S$GLB,, | Performed by: NEUROLOGICAL SURGERY

## 2022-05-18 PROCEDURE — 99215 OFFICE O/P EST HI 40 MIN: CPT | Mod: S$GLB,,, | Performed by: NEUROLOGICAL SURGERY

## 2022-05-18 PROCEDURE — 99215 PR OFFICE/OUTPT VISIT, EST, LEVL V, 40-54 MIN: ICD-10-PCS | Mod: S$GLB,,, | Performed by: NEUROLOGICAL SURGERY

## 2022-05-18 PROCEDURE — 3008F PR BODY MASS INDEX (BMI) DOCUMENTED: ICD-10-PCS | Mod: CPTII,S$GLB,, | Performed by: NEUROLOGICAL SURGERY

## 2022-05-18 PROCEDURE — 99999 PR PBB SHADOW E&M-EST. PATIENT-LVL III: CPT | Mod: PBBFAC,,, | Performed by: NEUROLOGICAL SURGERY

## 2022-05-18 PROCEDURE — 3079F DIAST BP 80-89 MM HG: CPT | Mod: CPTII,S$GLB,, | Performed by: NEUROLOGICAL SURGERY

## 2022-05-18 PROCEDURE — 3077F SYST BP >= 140 MM HG: CPT | Mod: CPTII,S$GLB,, | Performed by: NEUROLOGICAL SURGERY

## 2022-05-18 RX ORDER — TRIPROLIDINE/PSEUDOEPHEDRINE 2.5MG-60MG
TABLET ORAL EVERY 6 HOURS PRN
COMMUNITY
End: 2022-05-18

## 2022-05-18 RX ORDER — IBUPROFEN 800 MG/1
800 TABLET ORAL 3 TIMES DAILY
Qty: 90 TABLET | Refills: 1 | Status: SHIPPED | OUTPATIENT
Start: 2022-05-18 | End: 2022-09-20 | Stop reason: SDUPTHER

## 2022-05-19 NOTE — PROGRESS NOTES
CHIEF COMPLAINT:        Chief Complaint   Patient presents with    Follow-up    Neck Pain      INTERVAL HISTORY (5/18/22):  Patient returns for continued management of neck and low back pain.  He reports that around Easter time he had exacerbation of his sciatica that lasted approximately 1 week.  He had increased right sided back pain that radiates down the back of his legs to his calf.  The pain is improved but still present.  He has more low back pain than leg pain.  He still feels stiff around the waist.  He underwent a caudal STEPHENIE on 3/3/22 by Dr. Grady that he states helped about 1-1/2 weeks.    He also complains of neck pain that he describes as tightness laterally on the right side of his neck that is exacerbated by side bending to the right.  He underwent ES size of the neck approximately 2 years ago.  He denies any arm pain.  He denies any gait disturbance but does acknowledge some mild decreased dexterity that he notices as a .  He has some numbness in his fingertips that he describes as achy numbness when working above his head.     INTERVAL HISTORY (9/21/20):  F/u re: back and neck pain.    Underwent caudal STEPHENIE on 7/16/20 by Dr Grady and reports continued relief.  Main complaint is neck stiffness and grinding sensation with movement.  Denies any pain in arms.  Feels like hands are slightly weaker in that they cramp up at work as .  Some tingling in the fingers.  Noticed some difficulty opening bottles and unscrewing oil filters with left hand.  Dropping nuts and bolts at work more frequently.  No problems with walking or imbalance.      Refused PT.    States ibuprofen helps with arthritis pain.    INTERVAL HISTORY (6/7/20):  Patient returns for continued management of neck and back pain.  Patient has neck pain was the primary concern during his last visit however today he reports that it is better.  His main complaint today is low back pain and leg pain.  Pain is located over the  left SI joint and wraps around the waist as well as radiates up to the ribcage.  He notes that he has pain in his testicles, hip joint and groin.  He also notes some change in temperature sensation in the left thigh that is intermittent.  He describes it as cold and tingling.  It comes on suddenly and will radiate up to his nipples.  This will cause him to have to stand up to help alleviate the sensation.  He also notes that this is accompanied by whole body goose bumps.  The pain an sensation change is made worse by sitting and better with standing.  He reports a recent incident of impotence.     Surgery - L5-S1 lami  Injections - caudual on 10/10/19 - no help  PT - previously for low back but believes he hurt his neck     HPI:  Anmol Angeles JrNguyen is a 49 y.o.  male with below listed PMH, who is referred for evaluation of neck pain.    Reports pain, attention, and stiffness between his shoulder blades.  He reports decreased range of motion and tension in his neck in all positions except for flexion.  He denies any numbness but reports tingling in the arm and around his neck.  He denies any weakness.  He states that he has to watch his posture and his neck movements to prevent triggering the pain.  He believes the neck pain started after an injury doing PT for his low back.       He has diffuse stenosis in the cervical spine.  He denies any gait disturbance and although he is occasionally clumsy he denies dropping items from his hands or having issues with fine motor skills such as buttoning shirts.  He denies any bowel or bladder issues.       He works various jobs as a , lawncare, and .     PT - none for neck, injured neck during lumbar PT  Injections - low back only          Review of patient's allergies indicates:   Allergen Reactions    Latex, natural rubber                   Past Medical History:   Diagnosis Date    Anxiety      Back pain      Depressive disorder, not elsewhere  classified 11/25/2014    Perirectal fistula      Seizures                  Past Surgical History:   Procedure Laterality Date    BACK SURGERY        CAUDAL EPIDURAL STEROID INJECTION N/A 10/10/2019     Procedure: Injection-steroid-epidural-caudal;  Surgeon: Amol Grady Jr., MD;  [facility];  Service: Pain Management;  Laterality: N/A;    COLONOSCOPY N/A 8/22/2016     Procedure: COLONOSCOPY;  Surgeon: TROY Inman MD;  Location: [facility] ENDO (4TH FLR);  Service: Endoscopy;  Laterality: N/A;    EXAMINATION UNDER ANESTHESIA N/A 10/12/2018     Procedure: Exam under anesthesia;  Surgeon: TROY Inman MD;  Location: [facility] OR 2ND FLR;  Service: Colon and Rectal;  Laterality: N/A;    fistula surgery        HAND SURGERY Left      INSERTION OF SETON STITCH N/A 10/12/2018     Procedure: PLACEMENT, SETON STITCH;  Surgeon: TROY Inman MD;  Location: [facility] OR 2ND FLR;  Service: Colon and Rectal;  Laterality: N/A;    LEG SURGERY         plate    LEG SURGERY Right        History reviewed. No pertinent family history.  Social History                Tobacco Use    Smoking status: Current Every Day Smoker       Packs/day: 1.50       Years: 30.00       Pack years: 45.00       Types: Cigarettes    Smokeless tobacco: Never Used   Substance Use Topics    Alcohol use: Yes       Comment: beer occasionally     Drug use: Yes       Types: Marijuana         Review of Systems   Constitutional: Negative.    Respiratory: Negative for cough and shortness of breath.    Cardiovascular: Negative for chest pain, palpitations, claudication and leg swelling.   Gastrointestinal: Negative for abdominal pain, constipation and diarrhea.   Genitourinary: Negative for flank pain, frequency and urgency.   Musculoskeletal: Positive for neck pain. Negative for back pain, falls, joint pain and myalgias.   Skin: Negative.    Neurological: Positive for tingling. Negative for dizziness, tremors, sensory change, speech  change, focal weakness, seizures, loss of consciousness, weakness and headaches.   Psychiatric/Behavioral: Negative.          OBJECTIVE:     Vitals:    05/18/22 0827   BP: (!) 147/83   Pulse: 79       Physical Exam:  Constitutional: Patient sitting comfortably in chair. Appears well developed and well nourished.  Skin: Exposed areas are intact without abnormal markings, rashes or other lesions.  HEENT: Normocephalic. Normal conjunctivae.  Cardiovascular: Normal rate and regular rhythm.  Respiratory: Chest wall rises and falls symmetrically, without signs of respiratory distress.  Abdomen: Soft and non-tender.  Extremities: Warm and without edema. Calves supple, non-tender.  Psych/Behavior: Normal affect.     Neurological:     Mental status: Alert and oriented. Conversational and appropriate.       Cranial Nerves: VFF to confrontation. PERRL. EOMI without nystagmus. Facial STLT normal and symmetric. Strong, symmetric muscles of mastication. Facial strength full and symmetric. Hearing equal bilaterally to finger rub. Palate and uvula rise and fall normally in midline. Shoulder shrug 5/5 strength. Tongue midline.      Motor:     Upper:   Deltoids Triceps Biceps WE WF  FA     R 5/5 5/5 5/5 5/5 5/5 5/5 5/5     L 5/5 5/5 5/5 5/5 5/5 5/5 5/5      Lower:   HF KE KF DF PF EHL     R 5/5 5/5 5/5 5/5 5/5 5/5     L 5/5 5/5 5/5 5/5 5/5 5/5      Sensory: Intact sensation to light touch and pinprick in all extremities.      Reflexes:      DTR: 2+ knees and biceps symmetrically.     Chris's: Negative.     Babinski's: Negative.     Clonus: Negative.     Cerebellar: Finger-to-nose and rapid alternating movements normal.      Gait:  Stable, fluid.  Good tandem     Spine:     Posture: Head well aligned over pelvis and shoulders in front and side views.  No focal or global spinal deformity visible on inspection.      Cervical:      ROM: Full with flexion, extension, lateral rotation and ear-to-shoulder bend.      Midline TTP:  Negative.     Spurling's test: Negative.     Lhermitte's: Negative.     Thoracic:     Midline TTP: Negative     Lumbar:     Midline TTP: Negative     Straight Leg Test: Negative     Crossed Straight Leg Test: Negative     Other:     SI joint TTP: Negative.     Tenderness with external/internal hip rotation: Negative.     Diagnostic Results:  All imaging was independently reviewed by me.     Cervical MRI, 5/12/22:  1. Moderate CS C3-4 with myelomalacia  2. Moderate severe LRS at C4-5  3, Bilateral NFS at C5-6  4. Mild-moderate CS at C6-7    Lumbar MRI, 2/14/22:  1. Diffuse DDD with straightening of lordosis  2. Moderate severe CS and severe LRS at L3-4    Cervical flex/ex, 5/12/22:  No dynamic instability    Cervical MRI, 11/29/19  1. Moderate CS C3-4 with myelomalacia  2. Moderate severe LRS at C4-5  3, Bilateral NFS at C5-6  4. Mild-moderate CS at C6-7    Lumbar MRI, 9/12/20:  1. Diffuse DDD with straightening of lordosis  2. L2-3 mod L LRS 2/2 HNP, mild bilat NFS  3. L3-4 mild-mod CS, mod L NFS and mild-mod R NFS  4. L4-5 mild CS, mild-mod L NFS, mod R NFS  5. L5-S1 mild CS, mod R NFS, severe L NFS     MRI C spine, dated 11/29/19:  1. Moderate to moderate-severe CS at C3-4 to C6-7  2. Possible myelomalacia at C3-4     CT C spine, dated 11/14/19:  1. Diffuse degen changes        ASSESSMENT/PLAN:     Problem List Items Addressed This Visit        Neuro    Cervical spondylosis       Orthopedic    Chronic back pain - Primary    Relevant Orders    X-Ray Scoliosis Complete    Chronic neck pain        VISIT SUMMARY:  Has continued neck and low back pain without significant radicular symptoms.  He does not exhibit any overt signs of myelopathy despite having multilevel cervical stenosis and some myelomalacia.  He does have moderate severe central stenosis at L3-4 but again does not exhibit any radicular or neurogenic claudication symptoms.  I do not recommend surgery at this time and recommend continued conservative  management.  The source of his pain is likely facetogenic and he should undergo facet injections.     PATIENT EDUCATION:  More than half the clinic visit was spent showing with patient the pertinent findings on imaging and educating the patient about natural history of the pathology.   We discussed options for treatment as well as the risks and benefits of each option.  All questions were answered.      The patient understands and agrees with the following plan of care.     - F/u Dr Grady re: facet injections  - Declined PT  - RTC as needed  - Refilled ibuprofen 800  - Ordered scoliosis film    Time spent on this encounter: 60 minutes. This includes face-to-face time and non-face to face time preparing to see the patient (eg, review of tests), obtaining and/or reviewing separately obtained history, documenting clinical information in the electronic or other health record, independently interpreting results and communicating results to the patient/family/caregiver, or care coordinator.

## 2022-05-20 ENCOUNTER — HOSPITAL ENCOUNTER (OUTPATIENT)
Dept: RADIOLOGY | Facility: HOSPITAL | Age: 52
Discharge: HOME | End: 2022-05-20
Attending: NEUROLOGICAL SURGERY
Payer: MEDICARE

## 2022-05-20 ENCOUNTER — TELEPHONE (OUTPATIENT)
Dept: NEUROSURGERY | Facility: CLINIC | Age: 52
End: 2022-05-20
Payer: MEDICARE

## 2022-05-20 DIAGNOSIS — G89.29 CHRONIC BILATERAL LOW BACK PAIN, UNSPECIFIED WHETHER SCIATICA PRESENT: ICD-10-CM

## 2022-05-20 DIAGNOSIS — M54.50 CHRONIC BILATERAL LOW BACK PAIN, UNSPECIFIED WHETHER SCIATICA PRESENT: ICD-10-CM

## 2022-05-20 PROCEDURE — 72082 XR SCOLIOSIS COMPLETE: ICD-10-PCS | Mod: 26,,, | Performed by: RADIOLOGY

## 2022-05-20 PROCEDURE — 72082 X-RAY EXAM ENTIRE SPI 2/3 VW: CPT | Mod: TC,FY

## 2022-05-20 PROCEDURE — 72082 X-RAY EXAM ENTIRE SPI 2/3 VW: CPT | Mod: 26,,, | Performed by: RADIOLOGY

## 2022-05-20 NOTE — TELEPHONE ENCOUNTER
SW pt, explained that [facility] on [address] does not perform scoliosis XRs, I rescheduled pt to same date and time at [facility]. Pt happy with new location, he did not have any further questions at this time.

## 2022-08-02 ENCOUNTER — OFFICE VISIT (OUTPATIENT)
Dept: PAIN MEDICINE | Facility: CLINIC | Age: 52
End: 2022-08-02
Payer: MEDICARE

## 2022-08-02 VITALS
HEIGHT: 74 IN | WEIGHT: 199.63 LBS | HEART RATE: 76 BPM | RESPIRATION RATE: 16 BRPM | SYSTOLIC BLOOD PRESSURE: 135 MMHG | OXYGEN SATURATION: 98 % | DIASTOLIC BLOOD PRESSURE: 81 MMHG | BODY MASS INDEX: 25.62 KG/M2

## 2022-08-02 DIAGNOSIS — M47.816 LUMBAR SPONDYLOSIS: Primary | ICD-10-CM

## 2022-08-02 DIAGNOSIS — M48.061 SPINAL STENOSIS OF LUMBAR REGION, UNSPECIFIED WHETHER NEUROGENIC CLAUDICATION PRESENT: ICD-10-CM

## 2022-08-02 DIAGNOSIS — M51.36 DDD (DEGENERATIVE DISC DISEASE), LUMBAR: ICD-10-CM

## 2022-08-02 PROCEDURE — 3079F DIAST BP 80-89 MM HG: CPT | Mod: CPTII,S$GLB,, | Performed by: PAIN MEDICINE

## 2022-08-02 PROCEDURE — 3008F BODY MASS INDEX DOCD: CPT | Mod: CPTII,S$GLB,, | Performed by: PAIN MEDICINE

## 2022-08-02 PROCEDURE — 99999 PR PBB SHADOW E&M-EST. PATIENT-LVL V: CPT | Mod: PBBFAC,,, | Performed by: PAIN MEDICINE

## 2022-08-02 PROCEDURE — 3008F PR BODY MASS INDEX (BMI) DOCUMENTED: ICD-10-PCS | Mod: CPTII,S$GLB,, | Performed by: PAIN MEDICINE

## 2022-08-02 PROCEDURE — 1160F RVW MEDS BY RX/DR IN RCRD: CPT | Mod: CPTII,S$GLB,, | Performed by: PAIN MEDICINE

## 2022-08-02 PROCEDURE — 1159F PR MEDICATION LIST DOCUMENTED IN MEDICAL RECORD: ICD-10-PCS | Mod: CPTII,S$GLB,, | Performed by: PAIN MEDICINE

## 2022-08-02 PROCEDURE — 3075F SYST BP GE 130 - 139MM HG: CPT | Mod: CPTII,S$GLB,, | Performed by: PAIN MEDICINE

## 2022-08-02 PROCEDURE — 99213 PR OFFICE/OUTPT VISIT, EST, LEVL III, 20-29 MIN: ICD-10-PCS | Mod: S$GLB,,, | Performed by: PAIN MEDICINE

## 2022-08-02 PROCEDURE — 99999 PR PBB SHADOW E&M-EST. PATIENT-LVL V: ICD-10-PCS | Mod: PBBFAC,,, | Performed by: PAIN MEDICINE

## 2022-08-02 PROCEDURE — 1160F PR REVIEW ALL MEDS BY PRESCRIBER/CLIN PHARMACIST DOCUMENTED: ICD-10-PCS | Mod: CPTII,S$GLB,, | Performed by: PAIN MEDICINE

## 2022-08-02 PROCEDURE — 1159F MED LIST DOCD IN RCRD: CPT | Mod: CPTII,S$GLB,, | Performed by: PAIN MEDICINE

## 2022-08-02 PROCEDURE — 3079F PR MOST RECENT DIASTOLIC BLOOD PRESSURE 80-89 MM HG: ICD-10-PCS | Mod: CPTII,S$GLB,, | Performed by: PAIN MEDICINE

## 2022-08-02 PROCEDURE — 99213 OFFICE O/P EST LOW 20 MIN: CPT | Mod: S$GLB,,, | Performed by: PAIN MEDICINE

## 2022-08-02 PROCEDURE — 3075F PR MOST RECENT SYSTOLIC BLOOD PRESS GE 130-139MM HG: ICD-10-PCS | Mod: CPTII,S$GLB,, | Performed by: PAIN MEDICINE

## 2022-08-02 NOTE — PROGRESS NOTES
Subjective:     Patient ID: Anmol Angeles Jr. is a 52 y.o. male    Chief Complaint: Low-back Pain      Referred by: No ref. provider found      HPI:     Interval History (8/2/22):  He returns today for follow up.  He reports that he continues to have back pain.  Reports associated pain in the ankles and feet with associated paresthesias.  Was recently evaluated by Neurosurgery who did not recommend spine surgery and instead recommended trialing facet joint injections/medial branch blocks RFA.  Previous caudal epidural steroid injection did provide some relief but for only a short period of time.  Overall, patient denies any significant changes in the quality or location of his pain since that procedure.        Interval History (4/26/22):    The patient location is:  home  The chief complaint leading to consultation is:  Follow-up  Visit type: Virtual visit with audio.  Patient verbally consented for audio visit.  Total time spent with patient:  10 minutes  Each patient to whom he or she provides medical services by telemedicine is:  (1) informed of the relationship between the physician and patient and the respective role of any other health care provider with respect to management of the patient; and (2) notified that he or she may decline to receive medical services by telemedicine and may withdraw from such care at any time.      He returns today for follow up.  He reports that he was recently doing laundry and had a sudden exacerbation of low back and lower extremity pain.  States the pain is somewhat improved, but still having significant pain.  States he is still has difficulty working and performing ADLs.  He is scheduled to have a follow-up visit with Neurosurgery later today.       Interval History (3/22/22):  He returns today for follow up.  He reports that caudal epidural steroid injection has been helpful for the low back and lower extremity pain.  He reports roughly 80% relief following this  procedure.  He is happy with these results and does not feel that further treatment is needed at this time.      Interval History (2/15/22):  He returns today for follow up and imaging review.  He reports that his pain is unchanged in quality location since last encounter.  He denies any new worsening symptoms.     Interval History (2/3/22):  He returns today for follow up.  He reports that he is having acute low back pain.  States the with lifting a door 4 days ago and felt a sudden onset of low back pain.  The pain is located generally throughout the lumbar spine.  He denies any definitive pain radiating to lower extremities but does have some pain in his legs especially early in the morning.  He denies any new numbness, tingling, weakness, bowel bladder dysfunction.  He has difficulty sitting or lying down due to the pain.      Interval History (3/30/21):  He returns today for follow up.  He reports that T1-2 interlaminar epidural steroid injection has been helpful for the neck pain and vertigo.  He reports 100% relief of his vertigo symptoms.  He reports very significant improvement as bilateral neck pain.  He still has some stiffness and pain but otherwise is doing very well.  He does not feel that further treatment is needed at this time.      Interval History (2/25/21):  He returns today for follow up.  He reports that repeat caudal epidural steroid injection did provide very significant relief of his low back pain.  He states that he is beginning to have recurrent low back pain at this time.  More bothersome at this time however is his significant neck and upper extremity pain.  He has been evaluated by Neurosurgery who recommended further interventional procedures prior to considering cervical fusion.  Patient also reports having significant vertigo symptoms are quite bothersome.  Otherwise he denies any new numbness, tingling, weakness, bowel bladder dysfunction.  He denies any significant changes in the  quality or distribution of his pain.       Interval History (12/12/19):  He returns today for follow up and MRI review.  He reports that his symptoms have been unchanged since last encounter.  He denies any new or worsening symptoms.  He has an appointment with neurosurgery on 12/16/19.      Interval History (11/25/19):  He returns today for follow up.  He reports that caudal epidural steroid injection has not been helpful for the low back pain.  He denies any changes in the quality or location was low back pain.  He attended physical therapy for his low back during of his sessions began to have some mild neck pain. This pain developed into severe neck pain and stiffness with associated vertigo.  Patient was evaluated emergency room.  Patient states pain is interfering with his sleep.  He denies any numbness, tingling, weakness, bowel bladder dysfunction.  He states that he has taken Robaxin and this medicine help with his pain and help him to relax of aching sleep.      Initial Encounter (10/1/19):  Anmol Angeles Jr. is a 52 y.o. male who presents today with chronic bilateral low back pain that radiates the right lower extremity.  This pain has been present for many years.  No specific inciting event or injury noted.  Patient is status post low back surgery before 2005 for this issue.  He states that this surgery mostly help with his pain except for an occasional flare up that would last for about 3-4 days.  His current episode of pain has lasted for 2-3 months and involves left-sided low back and lower extremity.  He also reports some tightness in the leg.  Pain is constant worse with activity.  Pain is also worse with initiating activity after rest.  He reports diffuse numbness and tingling throughout his body.  He denies any weakness.  He denies any bowel or bladder dysfunction.    Physical Therapy:  Yes.    Non-pharmacologic Treatment:  Rest helps         · TENS?  No    Pain Medications:        "  · Currently taking:  Ibuprofen    · Has tried in the past:  Medrol Dosepak, Flexeril, Robaxin, Norco    · Has not tried:  TCAs, SNRIs, anticonvulsants, topical creams    Blood thinners:  None    Interventional Therapies:    10/10/19 - caudal epidural steroid injection - no relief noted  7/16/20 - caudal epidural steroid injection - significant relief for at least 6 months  3/18/21 - T1-2 interlaminar epidural steroid injection - 100% relief of vertigo and very significant relief of neck pain  3/3/22 - caudal epidural steroid injection - 80% relief    Relevant Surgeries:  Previous low back surgery before 2005    Affecting sleep?  Yes    Affecting daily activities? yes    Depressive symptoms? yes          · SI/HI? No    Work status: Disabled    Pain Scores:    Best:       6/10  Worst:    10/10  Usually:   7/10  Today:     9/10    Review of Systems   Constitutional: Negative for activity change, appetite change, chills, fatigue, fever and unexpected weight change.   HENT: Negative for hearing loss.    Eyes: Negative for visual disturbance.   Respiratory: Negative for chest tightness and shortness of breath.    Cardiovascular: Negative for chest pain.   Gastrointestinal: Negative for abdominal pain, constipation, diarrhea, nausea and vomiting.   Genitourinary: Negative for difficulty urinating.   Musculoskeletal: Positive for arthralgias, back pain, gait problem, myalgias, neck pain and neck stiffness.   Skin: Negative for rash.   Neurological: Negative for dizziness, weakness, light-headedness, numbness and headaches.   Psychiatric/Behavioral: Positive for sleep disturbance. Negative for hallucinations and suicidal ideas. The patient is not nervous/anxious.        Past Medical History:   Diagnosis Date    Anxiety     Back pain     Chronic back pain     sylvester commented it has been "years" and is related to the "type of hard work" he has done all his life and that it is an ongoing pain he deals with daily     " "Colon polyp     Depressive disorder, not elsewhere classified 11/25/2014    Perirectal fistula     Seizures     "about 7 years ago, had 3 episodes and has not had a seizure in over 2 years" patient spoke in length related to not having a seizure disorder and felt unheard by many doctors when he described his events 7 years ago to his doctors       Past Surgical History:   Procedure Laterality Date    BACK SURGERY      2003/2004    CAUDAL EPIDURAL STEROID INJECTION N/A 10/10/2019    Procedure: Injection-steroid-epidural-caudal;  Surgeon: Amol Grady Jr., MD;  Location: [facility] [place];  Service: Pain Management;  Laterality: N/A;    CAUDAL EPIDURAL STEROID INJECTION N/A 07/16/2020    Procedure: Injection-steroid-epidural-caudal;  Surgeon: Amol Grayd Jr., MD;  Location: [facility] [place];  Service: Pain Management;  Laterality: N/A;    CAUDAL EPIDURAL STEROID INJECTION N/A 03/03/2022    CAUDAL EPIDURAL STEROID INJECTION N/A 3/3/2022    Procedure: Injection-steroid-epidural-caudal;  Surgeon: Amol Grady Jr., MD;  Location: [facility] [place] [facility];  Service: Pain Management;  Laterality: N/A;    COLONOSCOPY N/A 08/22/2016    Procedure: COLONOSCOPY;  Surgeon: TROY Inman MD;  Location: [facility] ENDO (4TH FLR);  Service: Endoscopy;  Laterality: N/A;    EPIDURAL STEROID INJECTION N/A 03/18/2021    Procedure: Injection, Steroid, Epidural---T1-2;  Surgeon: Amol Grady Jr., MD;  Location: [facility] [place] [facility];  Service: Pain Management;  Laterality: N/A;    STEPHENIE  03/18/2021    EXAMINATION UNDER ANESTHESIA N/A 10/12/2018    Procedure: Exam under anesthesia;  Surgeon: TROY Inman MD;  Location: [facility] OR 2ND FLR;  Service: Colon and Rectal;  Laterality: N/A;    fistula surgery      perirectal    HAND SURGERY Left     INSERTION OF SETON STITCH N/A 10/12/2018    Procedure: PLACEMENT, SETON STITCH;  Surgeon: TROY Inman MD;  Location: [facility] OR 2ND FLR;  Service: Colon and Rectal;  Laterality: N/A;    LEG SURGERY   " "   plate    LEG SURGERY Right 1993       Social History     Socioeconomic History    Marital status: Single   Occupational History    Occupation: Maintenance with [employer]     Employer: [employer]    Tobacco Use    Smoking status: Current Every Day Smoker     Packs/day: 1.00     Years: 30.00     Pack years: 30.00     Types: Cigarettes    Smokeless tobacco: Never Used   Substance and Sexual Activity    Alcohol use: Yes     Comment: beer occasionally     Drug use: Yes     Types: Marijuana    Sexual activity: Yes       Review of patient's allergies indicates:   Allergen Reactions    Latex, natural rubber        Current Outpatient Medications on File Prior to Visit   Medication Sig Dispense Refill    ibuprofen (ADVIL,MOTRIN) 800 MG tablet Take 1 tablet (800 mg total) by mouth 3 (three) times daily. 90 tablet 1     Current Facility-Administered Medications on File Prior to Visit   Medication Dose Route Frequency Provider Last Rate Last Admin    0.9%  NaCl infusion   Intravenous Continuous Amol Grady Jr., MD   New Bag at 07/16/20 0843    dexAMETHasone sodium phos (PF) injection 10 mg  10 mg Epidural Once Amol Grady Jr., MD        fentaNYL injection 100 mcg  100 mcg Intravenous PRN Amol Grady Jr., MD   50 mcg at 07/16/20 0903    lidocaine (PF) 10 mg/ml (1%) injection 10 mg  1 mL Other Once Amol Grady Jr., MD        lidocaine HCL 20 mg/ml (2%) injection 10 mL  10 mL Other Once Amol Grady Jr., MD        midazolam (VERSED) 1 mg/mL injection 5 mg  5 mg Intravenous PRN Amol Grady Jr., MD   1 mg at 07/16/20 0900       Objective:      /81 (BP Location: Left arm, Patient Position: Sitting, BP Method: Medium (Automatic))   Pulse 76   Resp 16   Ht 6' 2" (1.88 m)   Wt 90.5 kg (199 lb 10 oz)   SpO2 98%   BMI 25.63 kg/m²     Exam:  GEN:  Well developed, well nourished.  Patient visibly uncomfortable throughout visit.  HEENT:  No trauma.  " Mucous membranes moist.  Nares patent bilaterally.  PSYCH: Normal affect. Thought content appropriate.  CHEST:  Breathing symmetric.  No audible wheezing.  ABD: Soft, non-distended.  SKIN:  Warm, pink, dry.  No rash on exposed areas.    EXT:  No cyanosis, clubbing, or edema.  No color change or changes in nail or hair growth.  NEURO/MUSCULOSKELETAL:  Fully alert, oriented, and appropriate. Speech normal duc. No cranial nerve deficits.   Gait:  Normal.  No focal motor deficits.               Imaging:      Narrative & Impression    EXAMINATION:  XR LUMBAR SPINE AP AND LAT WITH FLEX/EXT     CLINICAL HISTORY:  Other intervertebral disc degeneration, lumbar region     TECHNIQUE:  AP and lateral views as well as lateral flexion and extension images are performed through the lumbar spine.     COMPARISON:  06/01/2020     FINDINGS:  Lumbar vertebral body heights are maintained.  Disc space narrowing L4-5 and L5-S1.  Posterior osteophyte L4-5.  Facet arthropathy L3-4 through L5-S1.  AP alignment is anatomic.     Impression:     No acute osseous abnormality seen.  Stable degenerative change lower lumbar spine.        Electronically signed by: Yaritza Henson  Date:                                            02/14/2022  Time:                                           13:30           Narrative & Impression    EXAMINATION:  MRI LUMBAR SPINE WITHOUT CONTRAST     CLINICAL HISTORY:  lumbar radiculopathy; Other intervertebral disc degeneration, lumbar region     TECHNIQUE:  Multiplanar, multisequence MR images were acquired from the thoracolumbar junction to the sacrum without the administration of contrast.     COMPARISON:  09/12/2019     FINDINGS:  The marrow demonstrates homogeneous signal.  Vertebral body heights are maintained.  Disc space narrowing and endplate changes most pronounced L4-5 and L5-S1. Conus terminates appropriately at T12-L1.     Multilevel degenerative change as diesel below:     L1-2: Facet arthropathy with  mild bilateral neural foraminal narrowing.     L2-3: Diffuse posterior broad-based disc bulge, ligamentum flavum hypertrophy, and facet hypertrophic changes contribute to moderate canal and moderate left neural foraminal narrowing.  There may be a superimposed central disc protrusion, decreased in size compared to prior.     L3-4: Diffuse posterior broad-based disc bulge, ligamentum flavum hypertrophy, and facet hypertrophic changes contribute to moderate-severe canal narrowing.  Severe left and moderate right neural foraminal narrowing.     L4-5: Diffuse posterior broad-based disc bulge, ligamentum flavum hypertrophy, and facet hypertrophic changes.  Previous right laminectomy.  No significant narrowing of the central canal.  Neural foraminal narrowing, right greater than left.     L5-S1: Small diffuse posterior broad-based disc bulge without significant canal narrowing.  Moderate bilateral neural foraminal narrowing.     Impression:     Multilevel degenerative change with no detrimental change compared to prior.     Continued canal narrowing L2-3 and L3-4.  Disc protrusion at L2-3 is decreased in mildly decreased in size with improved canal narrowing.     Multilevel neural foraminal narrowing, moderate to severe, as given in detail above.        Electronically signed by: Yaritza Henson  Date:                                            02/14/2022  Time:                                           15:02             Narrative     EXAMINATION:  MRI CERVICAL SPINE WITHOUT CONTRAST    CLINICAL HISTORY:  cervical spinal stenosis;.  Other cervical disc degeneration, unspecified cervical region    TECHNIQUE:  Multiplanar, multisequence MR images of the cervical spine were acquired without the administration of contrast.    COMPARISON:  None.    FINDINGS:  Sagittal images demonstrate the vertebrae to be anatomically aligned.  There are normal in height and signal intensity without an acute marrow placement process or bone  marrow edema.  There is no compression deformity.  There is desiccation and disc space narrowing to a moderate degree at C5-6 and to a lesser degree C6-7.  Cervical cord is remarkable for edematous changes at the C3-4 disc level and to a less significant degree at the C5-6 level.  There is no mass lesion or cord expansion.  The paraspinal soft tissues are unremarkable.    C2-3.  Axial images show eccentric disc bulging and osteophyte complex to the right side along with facet and uncovertebral joint disease causing moderate right-sided foraminal narrowing.  There is no spinal stenosis.    C3-4.  Broad-based left paracentral protrusion of the disc impresses on the cervical cord facet and uncovertebral joint disease and to the moderate left-sided canal and foraminal narrowing.  Cord edema is evident without mass.    C4-5.  Asymmetric left-sided canal and foraminal narrowing is appreciated secondary to protrusion of the disc and uncovertebral joint disease.  There is also noted mild/moderate right-sided foraminal narrowing secondary to facet arthropathy.  There is no cord edema.    C5-6.  Bilateral foraminal narrowing is present.  This is moderate to severe on the right side secondary to facet and uncovertebral joint disease.  It is mild to moderate on the left side.  Spinal stenosis is minimal.    C6-7.  Mild spinal stenosis is present along with bilateral foraminal narrowing due to disc bulging and hypertrophic facet disease.    C7-T1.  No spinal stenosis or foraminal narrowing of significance.   Impression       1. Mild cord edema at the C3-4 level.  This is likely the result of recent trauma and moderate spinal stenosis.  There is a left paracentral disc protrusion along with facet arthropathy.  2. Moderate asymmetric left-sided spinal canal and foraminal narrowing at C4-5 secondary to disc protrusion and facet arthropathy.  3. Bilateral foraminal narrowing at C5-6 more significant on the right side.  4. Mild  spinal stenosis and bilateral foraminal narrowing at C6-7.      Electronically signed by: Brijesh Car  Date: 11/29/2019  Time: 08:51    Encounter     View Encounter                             Narrative     EXAMINATION:  MRI LUMBAR SPINE WITHOUT CONTRAST    CLINICAL HISTORY:  evaluate for lumbar annular tear, history of lumbar surgery;.  Other specified postprocedural states    TECHNIQUE:  Sagittal and axial T1 and T2 W images    COMPARISON:  Correlation is made to lumbar spine series 02/09/2015.    FINDINGS:  Sagittal images demonstrate straightening of lumbar lordosis with minimal retrolisthesis L5 on S1.  Alignment otherwise anatomic.    Disc degenerative change including desiccation and/or decreased height T12-L1 and L2-3 through L5-S1 discs.  Small multilevel anterior osteophytes noted.    L2-3, bulging of the disc is present with superimposed left paracentral protrusion resulting in moderate flattening of the adjacent ventral thecal sac and overall mild degree of canal stenosis.  Mild bilateral foraminal narrowing is also present.    L3-4, bulging of the disc is present with associated spondylosis and results in moderate effacement of the thecal sac, moderate left and mild right foraminal narrowing and overall mild to moderate degree of canal stenosis.    L4-5, mild bulging of the disc and associated spondylosis are present with mild impression on ventral thecal sac and moderate bilateral foraminal narrowing.    L5-S1, bulging of the disc and associated spondylosis are present with secondary fairly severe bilateral foraminal narrowing.    No disc herniation, canal compromise or foraminal compromise appreciated remaining lumbar or visualized lower thoracic disc levels.  The conus medullaris has a normal contour and signal.  Marrow degenerative change adjacent to the L5-S1 disc.   Impression       Straightening of lumbar lordosis.    L2-3, bulging of the disc with superimposed left paracentral protrusion  combination of findings resulting in moderate effacement ventral subarachnoid space and mild canal stenosis along with mild foraminal narrowing..    L3-4, bulging of the disc and associated spondylosis with moderate effacement ventral thecal sac and resulting in mild to moderate foraminal narrowing and overall mild to moderate degree of canal stenosis.    L4-5, mild bulging of the disc and associated spondylosis with moderate bilateral foraminal narrowing.    L5-S1, bulging of the disc and associated spondylosis with severe foraminal narrowing.      Electronically signed by: Kathy Mitchell MD  Date: 09/12/2019  Time: 11:02         Assessment:       Encounter Diagnoses   Name Primary?    Lumbar spondylosis Yes    DDD (degenerative disc disease), lumbar     Spinal stenosis of lumbar region, unspecified whether neurogenic claudication present          Plan:       Anmol was seen today for low-back pain.    Diagnoses and all orders for this visit:    Lumbar spondylosis  -     Case Request Operating Room: Block-nerve-medial branch-lumbar---BILATERAL L2, L3, L4  -     Case Request Operating Room: Block-nerve-medial branch-lumbar---BILATERAL L2, L3, L4  -     Case Request Operating Room: Radiofrequency Ablation---RIGHT L2, L3, L4  -     Case Request Operating Room: Radiofrequency Ablation---LEFT L2, L3, L4    DDD (degenerative disc disease), lumbar    Spinal stenosis of lumbar region, unspecified whether neurogenic claudication present        Anmol Angeles Jr. is a 52 y.o. male with acute exacerbation of chronic bilateral low back pain that radiates to the bilateral lower extremities.  He is known to have multilevel disc degeneration and multilevel foraminal stenosis with multilevel spinal stenosis as well.  Updated imaging does not show significant changes from previous studies.  Limited relief from caudal epidural steroid injection.  May have some degree of lumbar facet pain specifically at the L3-4 and L4-5  levels.    1.  Pertinent imaging studies reviewed by me. Imaging results were discussed with patient.  2.  Schedule for bilateral L2, L3, L4 medial branch blocks x2.  If diagnostic can proceed with radiofrequency ablations.  3.  Return to clinic after procedure to discuss results.

## 2022-08-02 NOTE — PATIENT INSTRUCTIONS
Reviewed pre op instructions with patient:    Please leave jewelry and valuables at home or give them to your escort for safe keeping.  You will be required to have an adult to escort you after the procedure is over. Although we will not be sedating you for your procedure we ask for an escort for safety after the procedure.  Do not take over the counter blood thinning medications beginning 7 days before the procedure (aspirin, Motrin, ibuprofen, Advil, Aleve, naproxen). If you are taking prescribed thinners (Coumadin, warfarin, apixaban, Eliquis, Plavix, clopidogrel, Pletal, etc) we will obtain cardiac clearance from your cardiologist or provider that is monitoring your medications and levels to hold the medications if appropriate.  Cleanse your skin the evening before with an antibacterial soap (Dial or Hibiclens) and then repeat it again the morning of the procedure.  Do not apply lotions, creams, deodorants, perfumes, or colognes the day of the procedure.  You will need to have your COVID testing done on the Monday before the scheduled procedure if you have not been vaccinated.  You will be contacted the day before the procedure to be given the time to arrive the day of the procedure. If you are not contacted by the afternoon before the procedure you should contact [phone].  Please do not eat or drink after midnight before the procedure.    Patient verbalized understanding of the instructions provided to them and clinic contact number was provided for any further questions they may have.

## 2022-08-30 ENCOUNTER — TELEPHONE (OUTPATIENT)
Dept: PAIN MEDICINE | Facility: CLINIC | Age: 52
End: 2022-08-30
Payer: MEDICARE

## 2022-08-30 NOTE — TELEPHONE ENCOUNTER
Spoke with patient. Confirmed that he received >80% relief from the recent procedure. No further issues discussed.

## 2022-08-30 NOTE — TELEPHONE ENCOUNTER
"----- Message from Adalberto Verde sent at 8/30/2022  9:03 AM CDT -----  Consult/Advisory:          Name Of Caller: Self      Contact Preference?: [phone]       Provider Name: Ysabel      Does patient feel the need to be seen today? No      What is the nature of the call?: Returning call to office; Requesting to leave voice message if he can't answer as he is currently at work          Additional Notes:  "Thank you for all that you do for our patients"         "

## 2022-08-30 NOTE — TELEPHONE ENCOUNTER
----- Message from Bernarda Mills sent at 8/30/2022  3:15 PM CDT -----  Regarding: Return Call  Contact: [phone]  Pt requesting a call back from Bacilio regarding a missed call.  Pt states he is currently at work and keeps missing the call. Pt will be off from work at 4:30.  Please call to discuss further.

## 2022-08-30 NOTE — TELEPHONE ENCOUNTER
Callback message left. Discuss the percentage of relief patient received from the previous procedure with provider.

## 2022-09-19 ENCOUNTER — TELEPHONE (OUTPATIENT)
Dept: PAIN MEDICINE | Facility: CLINIC | Age: 52
End: 2022-09-19
Payer: MEDICARE

## 2022-09-19 NOTE — TELEPHONE ENCOUNTER
Spoke with patient. Discussed the relief of the recent block. Stated the block was not effective at all. The block did not ease at all after the procedure for any time. Patient was informed that since the block did not provide relief that the remaining RFA procedure will be canceled. A new follow up appointment has been scheduled for patient to discuss options going forward in treatment. No further issue discussed.

## 2022-09-20 ENCOUNTER — OFFICE VISIT (OUTPATIENT)
Dept: PAIN MEDICINE | Facility: CLINIC | Age: 52
End: 2022-09-20
Payer: MEDICARE

## 2022-09-20 VITALS
RESPIRATION RATE: 16 BRPM | SYSTOLIC BLOOD PRESSURE: 125 MMHG | DIASTOLIC BLOOD PRESSURE: 84 MMHG | HEART RATE: 70 BPM | HEIGHT: 74 IN | BODY MASS INDEX: 25.23 KG/M2 | WEIGHT: 196.56 LBS

## 2022-09-20 DIAGNOSIS — M51.36 DDD (DEGENERATIVE DISC DISEASE), LUMBAR: Primary | ICD-10-CM

## 2022-09-20 DIAGNOSIS — M48.062 SPINAL STENOSIS OF LUMBAR REGION WITH NEUROGENIC CLAUDICATION: ICD-10-CM

## 2022-09-20 DIAGNOSIS — M50.30 DDD (DEGENERATIVE DISC DISEASE), CERVICAL: ICD-10-CM

## 2022-09-20 DIAGNOSIS — M47.812 CERVICAL SPONDYLOSIS: ICD-10-CM

## 2022-09-20 DIAGNOSIS — M47.816 LUMBAR SPONDYLOSIS: ICD-10-CM

## 2022-09-20 PROCEDURE — 1160F RVW MEDS BY RX/DR IN RCRD: CPT | Mod: CPTII,S$GLB,, | Performed by: PAIN MEDICINE

## 2022-09-20 PROCEDURE — 3008F PR BODY MASS INDEX (BMI) DOCUMENTED: ICD-10-PCS | Mod: CPTII,S$GLB,, | Performed by: PAIN MEDICINE

## 2022-09-20 PROCEDURE — 1160F PR REVIEW ALL MEDS BY PRESCRIBER/CLIN PHARMACIST DOCUMENTED: ICD-10-PCS | Mod: CPTII,S$GLB,, | Performed by: PAIN MEDICINE

## 2022-09-20 PROCEDURE — 3079F PR MOST RECENT DIASTOLIC BLOOD PRESSURE 80-89 MM HG: ICD-10-PCS | Mod: CPTII,S$GLB,, | Performed by: PAIN MEDICINE

## 2022-09-20 PROCEDURE — 99214 OFFICE O/P EST MOD 30 MIN: CPT | Mod: S$GLB,,, | Performed by: PAIN MEDICINE

## 2022-09-20 PROCEDURE — 99999 PR PBB SHADOW E&M-EST. PATIENT-LVL III: CPT | Mod: PBBFAC,,, | Performed by: PAIN MEDICINE

## 2022-09-20 PROCEDURE — 3079F DIAST BP 80-89 MM HG: CPT | Mod: CPTII,S$GLB,, | Performed by: PAIN MEDICINE

## 2022-09-20 PROCEDURE — 3008F BODY MASS INDEX DOCD: CPT | Mod: CPTII,S$GLB,, | Performed by: PAIN MEDICINE

## 2022-09-20 PROCEDURE — 3074F PR MOST RECENT SYSTOLIC BLOOD PRESSURE < 130 MM HG: ICD-10-PCS | Mod: CPTII,S$GLB,, | Performed by: PAIN MEDICINE

## 2022-09-20 PROCEDURE — 99214 PR OFFICE/OUTPT VISIT, EST, LEVL IV, 30-39 MIN: ICD-10-PCS | Mod: S$GLB,,, | Performed by: PAIN MEDICINE

## 2022-09-20 PROCEDURE — 1159F MED LIST DOCD IN RCRD: CPT | Mod: CPTII,S$GLB,, | Performed by: PAIN MEDICINE

## 2022-09-20 PROCEDURE — 3074F SYST BP LT 130 MM HG: CPT | Mod: CPTII,S$GLB,, | Performed by: PAIN MEDICINE

## 2022-09-20 PROCEDURE — 99999 PR PBB SHADOW E&M-EST. PATIENT-LVL III: ICD-10-PCS | Mod: PBBFAC,,, | Performed by: PAIN MEDICINE

## 2022-09-20 PROCEDURE — 1159F PR MEDICATION LIST DOCUMENTED IN MEDICAL RECORD: ICD-10-PCS | Mod: CPTII,S$GLB,, | Performed by: PAIN MEDICINE

## 2022-09-20 RX ORDER — IBUPROFEN 800 MG/1
800 TABLET ORAL 3 TIMES DAILY
Qty: 90 TABLET | Refills: 1 | Status: SHIPPED | OUTPATIENT
Start: 2022-09-20

## 2022-09-20 NOTE — PROGRESS NOTES
Subjective:     Patient ID: Anmol Angeles Jr. is a 52 y.o. male    Chief Complaint: Follow-up      Referred by: No ref. provider found      HPI:     Interval History (9/20/22):  He returns today for follow up.  He reports that lumbar medial branch blocks did not provide significant relief of his low back pain.  He denies any changes in the quality location of his low back pain.  He denies any new or worsening symptoms.  States he is also having recurrent neck pain with associated equilibrium issues with certain neck positions.       Interval History (8/2/22):  He returns today for follow up.  He reports that he continues to have back pain.  Reports associated pain in the ankles and feet with associated paresthesias.  Was recently evaluated by Neurosurgery who did not recommend spine surgery and instead recommended trialing facet joint injections/medial branch blocks RFA.  Previous caudal epidural steroid injection did provide some relief but for only a short period of time.  Overall, patient denies any significant changes in the quality or location of his pain since that procedure.        Interval History (4/26/22):    The patient location is:  home  The chief complaint leading to consultation is:  Follow-up  Visit type: Virtual visit with audio.  Patient verbally consented for audio visit.  Total time spent with patient:  10 minutes  Each patient to whom he or she provides medical services by telemedicine is:  (1) informed of the relationship between the physician and patient and the respective role of any other health care provider with respect to management of the patient; and (2) notified that he or she may decline to receive medical services by telemedicine and may withdraw from such care at any time.      He returns today for follow up.  He reports that he was recently doing laundry and had a sudden exacerbation of low back and lower extremity pain.  States the pain is somewhat improved, but still having  significant pain.  States he is still has difficulty working and performing ADLs.  He is scheduled to have a follow-up visit with Neurosurgery later today.       Interval History (3/22/22):  He returns today for follow up.  He reports that caudal epidural steroid injection has been helpful for the low back and lower extremity pain.  He reports roughly 80% relief following this procedure.  He is happy with these results and does not feel that further treatment is needed at this time.      Interval History (2/15/22):  He returns today for follow up and imaging review.  He reports that his pain is unchanged in quality location since last encounter.  He denies any new worsening symptoms.     Interval History (2/3/22):  He returns today for follow up.  He reports that he is having acute low back pain.  States the with lifting a door 4 days ago and felt a sudden onset of low back pain.  The pain is located generally throughout the lumbar spine.  He denies any definitive pain radiating to lower extremities but does have some pain in his legs especially early in the morning.  He denies any new numbness, tingling, weakness, bowel bladder dysfunction.  He has difficulty sitting or lying down due to the pain.      Interval History (3/30/21):  He returns today for follow up.  He reports that T1-2 interlaminar epidural steroid injection has been helpful for the neck pain and vertigo.  He reports 100% relief of his vertigo symptoms.  He reports very significant improvement as bilateral neck pain.  He still has some stiffness and pain but otherwise is doing very well.  He does not feel that further treatment is needed at this time.      Interval History (2/25/21):  He returns today for follow up.  He reports that repeat caudal epidural steroid injection did provide very significant relief of his low back pain.  He states that he is beginning to have recurrent low back pain at this time.  More bothersome at this time however is his  significant neck and upper extremity pain.  He has been evaluated by Neurosurgery who recommended further interventional procedures prior to considering cervical fusion.  Patient also reports having significant vertigo symptoms are quite bothersome.  Otherwise he denies any new numbness, tingling, weakness, bowel bladder dysfunction.  He denies any significant changes in the quality or distribution of his pain.       Interval History (12/12/19):  He returns today for follow up and MRI review.  He reports that his symptoms have been unchanged since last encounter.  He denies any new or worsening symptoms.  He has an appointment with neurosurgery on 12/16/19.      Interval History (11/25/19):  He returns today for follow up.  He reports that caudal epidural steroid injection has not been helpful for the low back pain.  He denies any changes in the quality or location was low back pain.  He attended physical therapy for his low back during of his sessions began to have some mild neck pain. This pain developed into severe neck pain and stiffness with associated vertigo.  Patient was evaluated emergency room.  Patient states pain is interfering with his sleep.  He denies any numbness, tingling, weakness, bowel bladder dysfunction.  He states that he has taken Robaxin and this medicine help with his pain and help him to relax of aching sleep.      Initial Encounter (10/1/19):  Anmol Angeles Jr. is a 52 y.o. male who presents today with chronic bilateral low back pain that radiates the right lower extremity.  This pain has been present for many years.  No specific inciting event or injury noted.  Patient is status post low back surgery before 2005 for this issue.  He states that this surgery mostly help with his pain except for an occasional flare up that would last for about 3-4 days.  His current episode of pain has lasted for 2-3 months and involves left-sided low back and lower extremity.  He also reports some  tightness in the leg.  Pain is constant worse with activity.  Pain is also worse with initiating activity after rest.  He reports diffuse numbness and tingling throughout his body.  He denies any weakness.  He denies any bowel or bladder dysfunction.    Physical Therapy:  Yes.    Non-pharmacologic Treatment:  Rest helps         TENS?  No    Pain Medications:         Currently taking:  Ibuprofen    Has tried in the past:  Medrol Dosepak, Flexeril, Robaxin, Norco    Has not tried:  TCAs, SNRIs, anticonvulsants, topical creams    Blood thinners:  None    Interventional Therapies:    10/10/19 - caudal epidural steroid injection - no relief noted  7/16/20 - caudal epidural steroid injection - significant relief for at least 6 months  3/18/21 - T1-2 interlaminar epidural steroid injection - 100% relief of vertigo and very significant relief of neck pain  3/3/22 - caudal epidural steroid injection - 80% relief  8/18/22 - bilateral L2, L3, L4 medial branch blocks - less than 30% relief   9/1/22 - bilateral L2, L3, L4 medial branch blocks - no significant relief noted    Relevant Surgeries:  Previous low back surgery before 2005    Affecting sleep?  Yes    Affecting daily activities? yes    Depressive symptoms? yes          SI/HI? No    Work status: Disabled    Pain Scores:    Best:       6/10  Worst:    10/10  Usually:   7/10  Today:     9/10    Review of Systems   Constitutional:  Negative for activity change, appetite change, chills, fatigue, fever and unexpected weight change.   HENT:  Negative for hearing loss.    Eyes:  Negative for visual disturbance.   Respiratory:  Negative for chest tightness and shortness of breath.    Cardiovascular:  Negative for chest pain.   Gastrointestinal:  Negative for abdominal pain, constipation, diarrhea, nausea and vomiting.   Genitourinary:  Negative for difficulty urinating.   Musculoskeletal:  Positive for arthralgias, back pain, gait problem, myalgias, neck pain and neck stiffness.  "  Skin:  Negative for rash.   Neurological:  Negative for dizziness, weakness, light-headedness, numbness and headaches.   Psychiatric/Behavioral:  Positive for sleep disturbance. Negative for hallucinations and suicidal ideas. The patient is not nervous/anxious.      Past Medical History:   Diagnosis Date    Anxiety     Back pain     Chronic back pain     sylvester commented it has been "years" and is related to the "type of hard work" he has done all his life and that it is an ongoing pain he deals with daily     Colon polyp     Depressive disorder, not elsewhere classified 11/25/2014    Perirectal fistula     Seizures     "about 7 years ago, had 3 episodes and has not had a seizure in over 2 years" patient spoke in length related to not having a seizure disorder and felt unheard by many doctors when he described his events 7 years ago to his doctors       Past Surgical History:   Procedure Laterality Date    BACK SURGERY      2003/2004    CAUDAL EPIDURAL STEROID INJECTION N/A 10/10/2019    Procedure: Injection-steroid-epidural-caudal;  Surgeon: Amol Grady Jr., MD;  Location: [facility] OR;  Service: Pain Management;  Laterality: N/A;    CAUDAL EPIDURAL STEROID INJECTION N/A 07/16/2020    Procedure: Injection-steroid-epidural-caudal;  Surgeon: Amol Grady Jr., MD;  Location: [facility] OR;  Service: Pain Management;  Laterality: N/A;    CAUDAL EPIDURAL STEROID INJECTION N/A 03/03/2022    CAUDAL EPIDURAL STEROID INJECTION N/A 3/3/2022    Procedure: Injection-steroid-epidural-caudal;  Surgeon: Amol Grady Jr., MD;  Location: [facility] PAIN MGMT;  Service: Pain Management;  Laterality: N/A;    COLONOSCOPY N/A 08/22/2016    Procedure: COLONOSCOPY;  Surgeon: TROY Inman MD;  Location: [facility] ENDO ([address] [place] [address]);  Service: Endoscopy;  Laterality: N/A;    EPIDURAL STEROID INJECTION N/A 03/18/2021    Procedure: Injection, Steroid, Epidural---T1-2;  Surgeon: Amol Grady Jr., MD;  Location: [facility] OR;  Service: " Pain Management;  Laterality: N/A;    STEPHENIE  03/18/2021    EXAMINATION UNDER ANESTHESIA N/A 10/12/2018    Procedure: Exam under anesthesia;  Surgeon: TROY Inman MD;  Location: [facility] OR [facility] FLR;  Service: Colon and Rectal;  Laterality: N/A;    fistula surgery      perirectal    HAND SURGERY Left     INJECTION OF ANESTHETIC AGENT AROUND MEDIAL BRANCH NERVES INNERVATING LUMBAR FACET JOINT Bilateral 8/18/2022    Procedure: Block-nerve-medial branch-lumbar---BILATERAL L2, L3, L4;  Surgeon: Amol Grady Jr., MD;  Location: [facility] PAIN MGMT;  Service: Pain Management;  Laterality: Bilateral;    INJECTION OF ANESTHETIC AGENT AROUND MEDIAL BRANCH NERVES INNERVATING LUMBAR FACET JOINT Bilateral 9/1/2022    Procedure: Block-nerve-medial branch-lumbar---BILATERAL L2, L3, L4;  Surgeon: Amol Grady Jr., MD;  Location: [facility] PAIN MGMT;  Service: Pain Management;  Laterality: Bilateral;    INSERTION OF SETON STITCH N/A 10/12/2018    Procedure: PLACEMENT, SETON STITCH;  Surgeon: TROY Inman MD;  [facility];  Service: Colon and Rectal;  Laterality: N/A;    LEG SURGERY      plate    LEG SURGERY Right 1993       Social History     Socioeconomic History    Marital status: Single   Occupational History    Occupation: Maintenance with [employer]     Employer: [employer]    Tobacco Use    Smoking status: Every Day     Packs/day: 1.00     Years: 30.00     Pack years: 30.00     Types: Cigarettes    Smokeless tobacco: Never   Substance and Sexual Activity    Alcohol use: Yes     Comment: beer occasionally     Drug use: Yes     Types: Marijuana    Sexual activity: Yes       Review of patient's allergies indicates:   Allergen Reactions    Latex, natural rubber Hives     Swelling/Hives.       Current Outpatient Medications on File Prior to Visit   Medication Sig Dispense Refill    [DISCONTINUED] ibuprofen (ADVIL,MOTRIN) 800 MG tablet Take 1 tablet (800 mg total) by mouth 3 (three) times daily. 90  "tablet 1     Current Facility-Administered Medications on File Prior to Visit   Medication Dose Route Frequency Provider Last Rate Last Admin    0.9%  NaCl infusion   Intravenous Continuous Amol Grady Jr., MD   New Bag at 07/16/20 0843    dexAMETHasone sodium phos (PF) injection 10 mg  10 mg Epidural Once Amol Grady Jr., MD        fentaNYL injection 100 mcg  100 mcg Intravenous PRN Amol Grady Jr., MD   50 mcg at 07/16/20 0903    lidocaine (PF) 10 mg/ml (1%) injection 10 mg  1 mL Other Once Amol Grady Jr., MD        lidocaine HCL 20 mg/ml (2%) injection 10 mL  10 mL Other Once Amol Grady Jr., MD        midazolam (VERSED) 1 mg/mL injection 5 mg  5 mg Intravenous PRN Amol Grady Jr., MD   1 mg at 07/16/20 0900       Objective:      /84 (BP Location: Left arm, Patient Position: Sitting, BP Method: Medium (Automatic))   Pulse 70   Resp 16   Ht 6' 2" (1.88 m)   Wt 89.1 kg (196 lb 8.6 oz)   BMI 25.23 kg/m²     Exam:  GEN:  Well developed, well nourished.  Patient visibly uncomfortable throughout visit.  HEENT:  No trauma.  Mucous membranes moist.  Nares patent bilaterally.  PSYCH: Normal affect. Thought content appropriate.  CHEST:  Breathing symmetric.  No audible wheezing.  ABD: Soft, non-distended.  SKIN:  Warm, pink, dry.  No rash on exposed areas.    EXT:  No cyanosis, clubbing, or edema.  No color change or changes in nail or hair growth.  NEURO/MUSCULOSKELETAL:  Fully alert, oriented, and appropriate. Speech normal duc. No cranial nerve deficits.   Gait:  Normal.  No focal motor deficits.               Imaging:      Narrative & Impression    EXAMINATION:  XR LUMBAR SPINE AP AND LAT WITH FLEX/EXT     CLINICAL HISTORY:  Other intervertebral disc degeneration, lumbar region     TECHNIQUE:  AP and lateral views as well as lateral flexion and extension images are performed through the lumbar spine.     COMPARISON:  06/01/2020     FINDINGS:  Lumbar " vertebral body heights are maintained.  Disc space narrowing L4-5 and L5-S1.  Posterior osteophyte L4-5.  Facet arthropathy L3-4 through L5-S1.  AP alignment is anatomic.     Impression:     No acute osseous abnormality seen.  Stable degenerative change lower lumbar spine.        Electronically signed by: Yaritza Henson  Date:                                            02/14/2022  Time:                                           13:30           Narrative & Impression    EXAMINATION:  MRI LUMBAR SPINE WITHOUT CONTRAST     CLINICAL HISTORY:  lumbar radiculopathy; Other intervertebral disc degeneration, lumbar region     TECHNIQUE:  Multiplanar, multisequence MR images were acquired from the thoracolumbar junction to the sacrum without the administration of contrast.     COMPARISON:  09/12/2019     FINDINGS:  The marrow demonstrates homogeneous signal.  Vertebral body heights are maintained.  Disc space narrowing and endplate changes most pronounced L4-5 and L5-S1. Conus terminates appropriately at T12-L1.     Multilevel degenerative change as diesel below:     L1-2: Facet arthropathy with mild bilateral neural foraminal narrowing.     L2-3: Diffuse posterior broad-based disc bulge, ligamentum flavum hypertrophy, and facet hypertrophic changes contribute to moderate canal and moderate left neural foraminal narrowing.  There may be a superimposed central disc protrusion, decreased in size compared to prior.     L3-4: Diffuse posterior broad-based disc bulge, ligamentum flavum hypertrophy, and facet hypertrophic changes contribute to moderate-severe canal narrowing.  Severe left and moderate right neural foraminal narrowing.     L4-5: Diffuse posterior broad-based disc bulge, ligamentum flavum hypertrophy, and facet hypertrophic changes.  Previous right laminectomy.  No significant narrowing of the central canal.  Neural foraminal narrowing, right greater than left.     L5-S1: Small diffuse posterior broad-based disc  bulge without significant canal narrowing.  Moderate bilateral neural foraminal narrowing.     Impression:     Multilevel degenerative change with no detrimental change compared to prior.     Continued canal narrowing L2-3 and L3-4.  Disc protrusion at L2-3 is decreased in mildly decreased in size with improved canal narrowing.     Multilevel neural foraminal narrowing, moderate to severe, as given in detail above.        Electronically signed by: Yaritza Henson  Date:                                            02/14/2022  Time:                                           15:02             Narrative     EXAMINATION:  MRI CERVICAL SPINE WITHOUT CONTRAST    CLINICAL HISTORY:  cervical spinal stenosis;.  Other cervical disc degeneration, unspecified cervical region    TECHNIQUE:  Multiplanar, multisequence MR images of the cervical spine were acquired without the administration of contrast.    COMPARISON:  None.    FINDINGS:  Sagittal images demonstrate the vertebrae to be anatomically aligned.  There are normal in height and signal intensity without an acute marrow placement process or bone marrow edema.  There is no compression deformity.  There is desiccation and disc space narrowing to a moderate degree at C5-6 and to a lesser degree C6-7.  Cervical cord is remarkable for edematous changes at the C3-4 disc level and to a less significant degree at the C5-6 level.  There is no mass lesion or cord expansion.  The paraspinal soft tissues are unremarkable.    C2-3.  Axial images show eccentric disc bulging and osteophyte complex to the right side along with facet and uncovertebral joint disease causing moderate right-sided foraminal narrowing.  There is no spinal stenosis.    C3-4.  Broad-based left paracentral protrusion of the disc impresses on the cervical cord facet and uncovertebral joint disease and to the moderate left-sided canal and foraminal narrowing.  Cord edema is evident without mass.    C4-5.  Asymmetric  left-sided canal and foraminal narrowing is appreciated secondary to protrusion of the disc and uncovertebral joint disease.  There is also noted mild/moderate right-sided foraminal narrowing secondary to facet arthropathy.  There is no cord edema.    C5-6.  Bilateral foraminal narrowing is present.  This is moderate to severe on the right side secondary to facet and uncovertebral joint disease.  It is mild to moderate on the left side.  Spinal stenosis is minimal.    C6-7.  Mild spinal stenosis is present along with bilateral foraminal narrowing due to disc bulging and hypertrophic facet disease.    C7-T1.  No spinal stenosis or foraminal narrowing of significance.   Impression       1. Mild cord edema at the C3-4 level.  This is likely the result of recent trauma and moderate spinal stenosis.  There is a left paracentral disc protrusion along with facet arthropathy.  2. Moderate asymmetric left-sided spinal canal and foraminal narrowing at C4-5 secondary to disc protrusion and facet arthropathy.  3. Bilateral foraminal narrowing at C5-6 more significant on the right side.  4. Mild spinal stenosis and bilateral foraminal narrowing at C6-7.      Electronically signed by: Brijesh Car  Date: 11/29/2019  Time: 08:51    Encounter     View Encounter                             Narrative     EXAMINATION:  MRI LUMBAR SPINE WITHOUT CONTRAST    CLINICAL HISTORY:  evaluate for lumbar annular tear, history of lumbar surgery;.  Other specified postprocedural states    TECHNIQUE:  Sagittal and axial T1 and T2 W images    COMPARISON:  Correlation is made to lumbar spine series 02/09/2015.    FINDINGS:  Sagittal images demonstrate straightening of lumbar lordosis with minimal retrolisthesis L5 on S1.  Alignment otherwise anatomic.    Disc degenerative change including desiccation and/or decreased height T12-L1 and L2-3 through L5-S1 discs.  Small multilevel anterior osteophytes noted.    L2-3, bulging of the disc is present with  superimposed left paracentral protrusion resulting in moderate flattening of the adjacent ventral thecal sac and overall mild degree of canal stenosis.  Mild bilateral foraminal narrowing is also present.    L3-4, bulging of the disc is present with associated spondylosis and results in moderate effacement of the thecal sac, moderate left and mild right foraminal narrowing and overall mild to moderate degree of canal stenosis.    L4-5, mild bulging of the disc and associated spondylosis are present with mild impression on ventral thecal sac and moderate bilateral foraminal narrowing.    L5-S1, bulging of the disc and associated spondylosis are present with secondary fairly severe bilateral foraminal narrowing.    No disc herniation, canal compromise or foraminal compromise appreciated remaining lumbar or visualized lower thoracic disc levels.  The conus medullaris has a normal contour and signal.  Marrow degenerative change adjacent to the L5-S1 disc.   Impression       Straightening of lumbar lordosis.    L2-3, bulging of the disc with superimposed left paracentral protrusion combination of findings resulting in moderate effacement ventral subarachnoid space and mild canal stenosis along with mild foraminal narrowing..    L3-4, bulging of the disc and associated spondylosis with moderate effacement ventral thecal sac and resulting in mild to moderate foraminal narrowing and overall mild to moderate degree of canal stenosis.    L4-5, mild bulging of the disc and associated spondylosis with moderate bilateral foraminal narrowing.    L5-S1, bulging of the disc and associated spondylosis with severe foraminal narrowing.      Electronically signed by: Kathy Mitchell MD  Date: 09/12/2019  Time: 11:02         Assessment:       Encounter Diagnoses   Name Primary?    DDD (degenerative disc disease), lumbar Yes    DDD (degenerative disc disease), cervical     Cervical spondylosis     Lumbar spondylosis     Spinal stenosis of  lumbar region with neurogenic claudication          Plan:       Anmol was seen today for follow-up.    Diagnoses and all orders for this visit:    DDD (degenerative disc disease), lumbar  -     ibuprofen (ADVIL,MOTRIN) 800 MG tablet; Take 1 tablet (800 mg total) by mouth 3 (three) times daily.    DDD (degenerative disc disease), cervical  -     ibuprofen (ADVIL,MOTRIN) 800 MG tablet; Take 1 tablet (800 mg total) by mouth 3 (three) times daily.    Cervical spondylosis  -     ibuprofen (ADVIL,MOTRIN) 800 MG tablet; Take 1 tablet (800 mg total) by mouth 3 (three) times daily.    Lumbar spondylosis  -     ibuprofen (ADVIL,MOTRIN) 800 MG tablet; Take 1 tablet (800 mg total) by mouth 3 (three) times daily.    Spinal stenosis of lumbar region with neurogenic claudication  -     ibuprofen (ADVIL,MOTRIN) 800 MG tablet; Take 1 tablet (800 mg total) by mouth 3 (three) times daily.      Anmol Angeles Jr. is a 52 y.o. male with acute exacerbation of chronic bilateral low back pain that radiates to the bilateral lower extremities.  He is known to have multilevel disc degeneration and multilevel foraminal stenosis with multilevel spinal stenosis as well.  Updated imaging does not show significant changes from previous studies.  Limited relief from caudal epidural steroid injection.  May have some degree of lumbar facet pain specifically at the L3-4 and L4-5 levels.  No significant relief with lumbar medial branch blocks.    1.  No further interventional options at this time.    2.  Follow-up with Neurosurgery to discuss potential surgical options.    3.  Continue ibuprofen 800 mg t.i.d. p.r.n..  4.  Return to clinic as needed.

## 2022-10-03 ENCOUNTER — PATIENT MESSAGE (OUTPATIENT)
Dept: PAIN MEDICINE | Facility: CLINIC | Age: 52
End: 2022-10-03
Payer: MEDICARE

## 2022-10-18 ENCOUNTER — TELEPHONE (OUTPATIENT)
Dept: NEUROSURGERY | Facility: CLINIC | Age: 52
End: 2022-10-18
Payer: MEDICARE

## 2022-10-18 NOTE — TELEPHONE ENCOUNTER
LM for pt, explained that Dr. Amin will not be available for pt's 9:00 am appt on 10/26/22 due to surgery. However, pt can be seen later that morning and was moved to 10:30 am. Advised pt to call back to reschedule if needed. Letter mailed.

## 2022-10-26 ENCOUNTER — OFFICE VISIT (OUTPATIENT)
Dept: NEUROSURGERY | Facility: CLINIC | Age: 52
End: 2022-10-26
Payer: MEDICARE

## 2022-10-26 VITALS
WEIGHT: 201.25 LBS | DIASTOLIC BLOOD PRESSURE: 104 MMHG | HEART RATE: 93 BPM | SYSTOLIC BLOOD PRESSURE: 162 MMHG | BODY MASS INDEX: 25.83 KG/M2 | HEIGHT: 74 IN

## 2022-10-26 DIAGNOSIS — M54.2 CHRONIC NECK PAIN: Primary | ICD-10-CM

## 2022-10-26 DIAGNOSIS — G89.29 CHRONIC NECK PAIN: Primary | ICD-10-CM

## 2022-10-26 DIAGNOSIS — M54.50 CHRONIC BILATERAL LOW BACK PAIN WITHOUT SCIATICA: ICD-10-CM

## 2022-10-26 DIAGNOSIS — G89.29 CHRONIC BILATERAL LOW BACK PAIN WITHOUT SCIATICA: ICD-10-CM

## 2022-10-26 PROCEDURE — 1160F RVW MEDS BY RX/DR IN RCRD: CPT | Mod: CPTII,S$GLB,, | Performed by: NEUROLOGICAL SURGERY

## 2022-10-26 PROCEDURE — 1159F MED LIST DOCD IN RCRD: CPT | Mod: CPTII,S$GLB,, | Performed by: NEUROLOGICAL SURGERY

## 2022-10-26 PROCEDURE — 3077F PR MOST RECENT SYSTOLIC BLOOD PRESSURE >= 140 MM HG: ICD-10-PCS | Mod: CPTII,S$GLB,, | Performed by: NEUROLOGICAL SURGERY

## 2022-10-26 PROCEDURE — 99999 PR PBB SHADOW E&M-EST. PATIENT-LVL III: ICD-10-PCS | Mod: PBBFAC,,, | Performed by: NEUROLOGICAL SURGERY

## 2022-10-26 PROCEDURE — 3080F DIAST BP >= 90 MM HG: CPT | Mod: CPTII,S$GLB,, | Performed by: NEUROLOGICAL SURGERY

## 2022-10-26 PROCEDURE — 99999 PR PBB SHADOW E&M-EST. PATIENT-LVL III: CPT | Mod: PBBFAC,,, | Performed by: NEUROLOGICAL SURGERY

## 2022-10-26 PROCEDURE — 3077F SYST BP >= 140 MM HG: CPT | Mod: CPTII,S$GLB,, | Performed by: NEUROLOGICAL SURGERY

## 2022-10-26 PROCEDURE — 99215 OFFICE O/P EST HI 40 MIN: CPT | Mod: S$GLB,,, | Performed by: NEUROLOGICAL SURGERY

## 2022-10-26 PROCEDURE — 1159F PR MEDICATION LIST DOCUMENTED IN MEDICAL RECORD: ICD-10-PCS | Mod: CPTII,S$GLB,, | Performed by: NEUROLOGICAL SURGERY

## 2022-10-26 PROCEDURE — 99215 PR OFFICE/OUTPT VISIT, EST, LEVL V, 40-54 MIN: ICD-10-PCS | Mod: S$GLB,,, | Performed by: NEUROLOGICAL SURGERY

## 2022-10-26 PROCEDURE — 1160F PR REVIEW ALL MEDS BY PRESCRIBER/CLIN PHARMACIST DOCUMENTED: ICD-10-PCS | Mod: CPTII,S$GLB,, | Performed by: NEUROLOGICAL SURGERY

## 2022-10-26 PROCEDURE — 3080F PR MOST RECENT DIASTOLIC BLOOD PRESSURE >= 90 MM HG: ICD-10-PCS | Mod: CPTII,S$GLB,, | Performed by: NEUROLOGICAL SURGERY

## 2022-10-26 NOTE — PROGRESS NOTES
"CHIEF COMPLAINT:        Chief Complaint   Patient presents with    Follow-up    Neck Pain       INTERVAL HISTORY (10/26/22):  Patient returns to clinic to discuss his continued neck and low back pain. Today, he reports that his neck is bothering him more than his back pain. For the past 2-3 months, his neck discomfort has returned. He describes it as a "pressure" that starts posterior midline and radiates outward. He only experiences this pressure when he tilts his head to he left side. When he does so, he experiences disorientation, like his body is "not in tune" which can sometimes affect his vision and ability to screw bolts. Patient is a  and this affects his ability to work. He states that these sensations resolve rather quickly if he turns his head back towards the right. He denies radiculopathic and myelopathic symptoms in his BUE - he does not have any shooting pain or issues with writing, using his phone, or dropping things otherwise. He reports that neck injections about a year ago with Dr. Grady has helped with these neck issues. His back pain is constant and has not worsened or changed since his last visit.     INTERVAL HISTORY (5/18/22):  Patient returns for continued management of neck and low back pain.  He reports that around Easter time he had exacerbation of his sciatica that lasted approximately 1 week.  He had increased right sided back pain that radiates down the back of his legs to his calf.  The pain is improved but still present.  He has more low back pain than leg pain.  He still feels stiff around the waist.  He underwent a caudal STEPHENIE on 3/3/22 by Dr. Grady that he states helped about 1-1/2 weeks.    He also complains of neck pain that he describes as tightness laterally on the right side of his neck that is exacerbated by side bending to the right.  He underwent ES size of the neck approximately 2 years ago.  He denies any arm pain.  He denies any gait disturbance but does " acknowledge some mild decreased dexterity that he notices as a .  He has some numbness in his fingertips that he describes as achy numbness when working above his head.     INTERVAL HISTORY (9/21/20):  F/u re: back and neck pain.    Underwent caudal STEPHENIE on 7/16/20 by Dr Grady and reports continued relief.  Main complaint is neck stiffness and grinding sensation with movement.  Denies any pain in arms.  Feels like hands are slightly weaker in that they cramp up at work as .  Some tingling in the fingers.  Noticed some difficulty opening bottles and unscrewing oil filters with left hand.  Dropping nuts and bolts at work more frequently.  No problems with walking or imbalance.      Refused PT.    States ibuprofen helps with arthritis pain.    INTERVAL HISTORY (6/7/20):  Patient returns for continued management of neck and back pain.  Patient has neck pain was the primary concern during his last visit however today he reports that it is better.  His main complaint today is low back pain and leg pain.  Pain is located over the left SI joint and wraps around the waist as well as radiates up to the ribcage.  He notes that he has pain in his testicles, hip joint and groin.  He also notes some change in temperature sensation in the left thigh that is intermittent.  He describes it as cold and tingling.  It comes on suddenly and will radiate up to his nipples.  This will cause him to have to stand up to help alleviate the sensation.  He also notes that this is accompanied by whole body goose bumps.  The pain an sensation change is made worse by sitting and better with standing.  He reports a recent incident of impotence.     Surgery - L5-S1 lami  Injections - caudual on 10/10/19 - no help  PT - previously for low back but believes he hurt his neck     HPI:  Anmol Angeles . is a 49 y.o.  male with below listed PMH, who is referred for evaluation of neck pain.    Reports pain, attention, and stiffness  between his shoulder blades.  He reports decreased range of motion and tension in his neck in all positions except for flexion.  He denies any numbness but reports tingling in the arm and around his neck.  He denies any weakness.  He states that he has to watch his posture and his neck movements to prevent triggering the pain.  He believes the neck pain started after an injury doing PT for his low back.       He has diffuse stenosis in the cervical spine.  He denies any gait disturbance and although he is occasionally clumsy he denies dropping items from his hands or having issues with fine motor skills such as buttoning shirts.  He denies any bowel or bladder issues.       He works various jobs as a , lawncare, and .     PT - none for neck, injured neck during lumbar PT  Injections - low back only          Review of patient's allergies indicates:   Allergen Reactions    Latex, natural rubber                   Past Medical History:   Diagnosis Date    Anxiety      Back pain      Depressive disorder, not elsewhere classified 11/25/2014    Perirectal fistula      Seizures                  Past Surgical History:   Procedure Laterality Date    BACK SURGERY        CAUDAL EPIDURAL STEROID INJECTION N/A 10/10/2019     Procedure: Injection-steroid-epidural-caudal;  Surgeon: Amol Grady Jr., MD;  Location: [facility];  Service: Pain Management;  Laterality: N/A;    COLONOSCOPY N/A 8/22/2016     Procedure: COLONOSCOPY;  Surgeon: TROY Inman MD;  Location: [facility] (4TH FLR);  Service: Endoscopy;  Laterality: N/A;    EXAMINATION UNDER ANESTHESIA N/A 10/12/2018     Procedure: Exam under anesthesia;  Surgeon: TROY Inman MD;  Location: [facility] OR 2ND FLR;  Service: Colon and Rectal;  Laterality: N/A;    fistula surgery        HAND SURGERY Left      INSERTION OF SETON STITCH N/A 10/12/2018     Procedure: PLACEMENT, SETON STITCH;  Surgeon: TROY Inman MD;  Location: [facility] OR 2ND FLR;  Service:  Colon and Rectal;  Laterality: N/A;    LEG SURGERY         plate    LEG SURGERY Right        History reviewed. No pertinent family history.  Social History                Tobacco Use    Smoking status: Current Every Day Smoker       Packs/day: 1.50       Years: 30.00       Pack years: 45.00       Types: Cigarettes    Smokeless tobacco: Never Used   Substance Use Topics    Alcohol use: Yes       Comment: beer occasionally     Drug use: Yes       Types: Marijuana         Review of Systems   Constitutional: Negative.    Respiratory: Negative for cough and shortness of breath.    Cardiovascular: Negative for chest pain, palpitations, claudication and leg swelling.   Gastrointestinal: Negative for abdominal pain, constipation and diarrhea.   Genitourinary: Negative for flank pain, frequency and urgency.   Musculoskeletal: Positive for neck pain. Negative for back pain, falls, joint pain and myalgias.   Skin: Negative.    Neurological: Positive for tingling. Negative for dizziness, tremors, sensory change, speech change, focal weakness, seizures, loss of consciousness, weakness and headaches.   Psychiatric/Behavioral: Negative.          OBJECTIVE:     Vitals:    10/26/22 1026   BP: (!) 162/104   Pulse: 93       Physical Exam:  Constitutional: Patient sitting comfortably in chair. Appears well developed and well nourished.  Skin: Exposed areas are intact without abnormal markings, rashes or other lesions. Vitiligo on bilateral hands.  HEENT: Normocephalic. Normal conjunctivae. Head sometimes titled slightly to the right.  Cardiovascular: Normal rate and regular rhythm.  Respiratory: Chest wall rises and falls symmetrically, without signs of respiratory distress.  Abdomen: Soft and non-tender.  Extremities: Warm and without edema. Calves supple, non-tender.  Psych/Behavior: Normal affect.     Neurological:     Mental status: Alert and oriented. Conversational and appropriate.       Cranial Nerves: VFF to confrontation.  PERRL. EOMI without nystagmus. Facial STLT normal and symmetric. Strong, symmetric muscles of mastication. Facial strength full and symmetric. Hearing equal bilaterally to finger rub. Palate and uvula rise and fall normally in midline. Shoulder shrug 5/5 strength. Tongue midline.      Motor:     Upper:   Deltoids Triceps Biceps WE WF  FA     R 5/5 5/5 5/5 5/5 5/5 5/5 5/5     L 5/5 5/5 5/5 5/5 5/5 5/5 5/5      Lower:   HF KE KF DF PF EHL     R 5/5 5/5 5/5 5/5 5/5 5/5     L 5/5 5/5 5/5 5/5 5/5 5/5      Sensory: Intact sensation to light touch and pinprick in all extremities.      Reflexes:      DTR: 2+ knees and biceps symmetrically.     Chris's: Negative.     Babinski's: Negative.     Clonus: Negative.     Cerebellar: Finger-to-nose and rapid alternating movements normal.      Gait:  Stable, fluid.  Good tandem     Spine:     Posture: Head well aligned over pelvis and shoulders in front and side views, tilts head toward right side at times. No focal or global spinal deformity visible on inspection.      Cervical:      ROM: Full with flexion, extension, lateral rotation and ear-to-shoulder bend.      Midline TTP: Negative.     Spurling's test: Negative.     Lhermitte's: Negative.     Thoracic:     Midline TTP: Negative     Lumbar:     Midline TTP: Negative     Straight Leg Test: Negative     Crossed Straight Leg Test: Negative     Other:     SI joint TTP: Negative.     Tenderness with external/internal hip rotation: Negative.     Diagnostic Results:  All imaging was independently reviewed by me.     Cervical MRI, 5/12/22:  1. Moderate CS C3-4 with myelomalacia  2. Moderate severe LRS at C4-5  3, Bilateral NFS at C5-6  4. Mild-moderate CS at C6-7    Lumbar MRI, 2/14/22:  1. Diffuse DDD with straightening of lordosis  2. Moderate severe CS and severe LRS at L3-4    Cervical flex/ex, 5/12/22:  No dynamic instability    Cervical MRI, 11/29/19  1. Moderate CS C3-4 with myelomalacia  2. Moderate severe LRS at C4-5  3,  Bilateral NFS at C5-6  4. Mild-moderate CS at C6-7    Lumbar MRI, 9/12/20:  1. Diffuse DDD with straightening of lordosis  2. L2-3 mod L LRS 2/2 HNP, mild bilat NFS  3. L3-4 mild-mod CS, mod L NFS and mild-mod R NFS  4. L4-5 mild CS, mild-mod L NFS, mod R NFS  5. L5-S1 mild CS, mod R NFS, severe L NFS     MRI C spine, dated 11/29/19:  1. Moderate to moderate-severe CS at C3-4 to C6-7  2. Possible myelomalacia at C3-4     CT C spine, dated 11/14/19:  1. Diffuse degen changes        ASSESSMENT/PLAN:     Problem List Items Addressed This Visit          Orthopedic    Chronic neck pain - Primary    Relevant Orders    Ambulatory referral/consult to [facility]    Ambulatory referral/consult to Sports Medicine    X-Ray Scoliosis Complete    Chronic back pain    Relevant Orders    Ambulatory referral/consult to [facility]    Ambulatory referral/consult to Sports Medicine    X-Ray Scoliosis Complete     VISIT SUMMARY:  Has continued neck and low back pain without significant radicular symptoms.  Neck pain predominates at this time. He does not exhibit any overt signs of myelopathy despite having multilevel cervical stenosis and some myelomalacia.  He does have moderate severe central stenosis at L3-4 but again does not exhibit any radicular or neurogenic claudication symptoms.  I do not recommend surgery at this time and recommend continued conservative management.  The source of his pain is likely facetogenic.  He achieved good relief from prior T1-2 injection previously and would like to repeat.  I also rec he enroll in Health Neck Program and will refer to Dr Hennessy for OMT.  I suspect his work as  which requires awkward neck postures/positions is the trigger for his symptoms.     PATIENT EDUCATION:  More than half the clinic visit was spent showing with patient the pertinent findings on imaging and educating the patient about natural history of the pathology.   We discussed options for  treatment as well as the risks and benefits of each option.  All questions were answered.      The patient understands and agrees with the following plan of care.     - F/u Dr Grady re: cervical facet injections  - Referral to healthy back/neck program  - Referral to Sports Medicine  (Dr Hennessy) re: OMT  - Re-ordered scoliosis film (prior one was incomplete)  - RTC as needed    Time spent on this encounter: 60 minutes. This includes face-to-face time and non-face to face time preparing to see the patient (eg, review of tests), obtaining and/or reviewing separately obtained history, documenting clinical information in the electronic or other health record, independently interpreting results and communicating results to the patient/family/caregiver, or care coordinator.

## 2022-10-27 ENCOUNTER — TELEPHONE (OUTPATIENT)
Dept: NEUROSURGERY | Facility: CLINIC | Age: 52
End: 2022-10-27
Payer: MEDICARE

## 2022-10-27 DIAGNOSIS — M48.02 SPINAL STENOSIS, CERVICAL REGION: ICD-10-CM

## 2022-10-27 DIAGNOSIS — M47.812 CERVICAL SPONDYLOSIS: Primary | ICD-10-CM

## 2022-10-27 DIAGNOSIS — M54.2 CERVICALGIA: ICD-10-CM

## 2022-10-28 ENCOUNTER — HOSPITAL ENCOUNTER (OUTPATIENT)
Dept: RADIOLOGY | Facility: HOSPITAL | Age: 52
Discharge: HOME | End: 2022-10-28
Attending: NEUROLOGICAL SURGERY
Payer: MEDICARE

## 2022-10-28 DIAGNOSIS — M54.2 CHRONIC NECK PAIN: ICD-10-CM

## 2022-10-28 DIAGNOSIS — G89.29 CHRONIC BILATERAL LOW BACK PAIN WITHOUT SCIATICA: ICD-10-CM

## 2022-10-28 DIAGNOSIS — G89.29 CHRONIC NECK PAIN: ICD-10-CM

## 2022-10-28 DIAGNOSIS — M54.50 CHRONIC BILATERAL LOW BACK PAIN WITHOUT SCIATICA: ICD-10-CM

## 2022-10-28 PROCEDURE — 72082 X-RAY EXAM ENTIRE SPI 2/3 VW: CPT | Mod: TC

## 2022-10-28 PROCEDURE — 72082 XR SCOLIOSIS COMPLETE: ICD-10-PCS | Mod: 26,,, | Performed by: RADIOLOGY

## 2022-10-28 PROCEDURE — 72082 X-RAY EXAM ENTIRE SPI 2/3 VW: CPT | Mod: 26,,, | Performed by: RADIOLOGY

## 2022-10-31 ENCOUNTER — TELEPHONE (OUTPATIENT)
Dept: SPORTS MEDICINE | Facility: CLINIC | Age: 52
End: 2022-10-31
Payer: MEDICARE

## 2022-10-31 NOTE — TELEPHONE ENCOUNTER
----- Message from Sherrell Hein MA sent at 10/31/2022  2:36 PM CDT -----  Good Afternoon,    Pt has referral for Sports Medicine from Dr. Amin, would you be able to assist in scheduling patient?    Thank you,  Cinthia

## 2022-10-31 NOTE — TELEPHONE ENCOUNTER
Spoke to the Pt about making an appt for his referral from his Dr.  We found a date and time that worked for him and he confirmed he details and location.

## 2022-11-02 ENCOUNTER — TELEPHONE (OUTPATIENT)
Dept: PAIN MEDICINE | Facility: CLINIC | Age: 52
End: 2022-11-02
Payer: MEDICARE

## 2022-11-02 DIAGNOSIS — M50.30 DDD (DEGENERATIVE DISC DISEASE), CERVICAL: Primary | ICD-10-CM

## 2022-11-02 DIAGNOSIS — M54.12 CERVICAL RADICULOPATHY: ICD-10-CM

## 2022-11-02 NOTE — TELEPHONE ENCOUNTER
Spoke with patient about a direct to procedure request from Dr Ian Amin DO. The provider is requesting STEPHENIE C7-T1 injection. Discussed with patient and agreed to the date of 11/17/22 once Dr Grady reviewed the imaging and notes for scheduling. Reviewed pre op instructions. Verbalized understanding. No further issues discussed.

## 2022-12-08 ENCOUNTER — TELEPHONE (OUTPATIENT)
Dept: SPORTS MEDICINE | Facility: CLINIC | Age: 52
End: 2022-12-08
Payer: MEDICARE

## 2022-12-08 NOTE — TELEPHONE ENCOUNTER
Contacted patient to reschedule appointment due to early clinic closure. Patient expressed understanding of new appointment time.    Liza Rust MS, OTC  Clinical Assistant to Dr. Emerson Hennessy

## 2022-12-16 ENCOUNTER — CLINICAL SUPPORT (OUTPATIENT)
Dept: REHABILITATION | Facility: OTHER | Age: 52
End: 2022-12-16
Attending: NEUROLOGICAL SURGERY
Payer: MEDICARE

## 2022-12-16 ENCOUNTER — OFFICE VISIT (OUTPATIENT)
Dept: SPORTS MEDICINE | Facility: CLINIC | Age: 52
End: 2022-12-16
Payer: MEDICARE

## 2022-12-16 VITALS
SYSTOLIC BLOOD PRESSURE: 137 MMHG | WEIGHT: 200 LBS | BODY MASS INDEX: 25.67 KG/M2 | HEIGHT: 74 IN | DIASTOLIC BLOOD PRESSURE: 83 MMHG | HEART RATE: 78 BPM

## 2022-12-16 DIAGNOSIS — R29.898 DECREASED STRENGTH OF TRUNK AND BACK: ICD-10-CM

## 2022-12-16 DIAGNOSIS — G89.29 CHRONIC NECK PAIN: ICD-10-CM

## 2022-12-16 DIAGNOSIS — M54.50 CHRONIC BILATERAL LOW BACK PAIN WITHOUT SCIATICA: Primary | ICD-10-CM

## 2022-12-16 DIAGNOSIS — M54.50 CHRONIC BILATERAL LOW BACK PAIN WITHOUT SCIATICA: ICD-10-CM

## 2022-12-16 DIAGNOSIS — G89.29 CHRONIC BILATERAL LOW BACK PAIN WITHOUT SCIATICA: ICD-10-CM

## 2022-12-16 DIAGNOSIS — M25.69 DECREASED RANGE OF MOTION OF TRUNK AND BACK: ICD-10-CM

## 2022-12-16 DIAGNOSIS — M54.2 CHRONIC NECK PAIN: ICD-10-CM

## 2022-12-16 DIAGNOSIS — G89.29 CHRONIC BILATERAL LOW BACK PAIN WITHOUT SCIATICA: Primary | ICD-10-CM

## 2022-12-16 DIAGNOSIS — R29.3 POOR POSTURE: ICD-10-CM

## 2022-12-16 DIAGNOSIS — R29.898 WEAKNESS OF BOTH LOWER EXTREMITIES: ICD-10-CM

## 2022-12-16 DIAGNOSIS — Z74.09 IMPAIRED FUNCTIONAL MOBILITY, BALANCE, GAIT, AND ENDURANCE: ICD-10-CM

## 2022-12-16 PROCEDURE — 97162 PT EVAL MOD COMPLEX 30 MIN: CPT

## 2022-12-16 PROCEDURE — 99204 PR OFFICE/OUTPT VISIT, NEW, LEVL IV, 45-59 MIN: ICD-10-PCS | Mod: S$GLB,,, | Performed by: ORTHOPAEDIC SURGERY

## 2022-12-16 PROCEDURE — 99999 PR PBB SHADOW E&M-EST. PATIENT-LVL III: CPT | Mod: PBBFAC,,, | Performed by: ORTHOPAEDIC SURGERY

## 2022-12-16 PROCEDURE — 3008F BODY MASS INDEX DOCD: CPT | Mod: CPTII,S$GLB,, | Performed by: ORTHOPAEDIC SURGERY

## 2022-12-16 PROCEDURE — 3075F SYST BP GE 130 - 139MM HG: CPT | Mod: CPTII,S$GLB,, | Performed by: ORTHOPAEDIC SURGERY

## 2022-12-16 PROCEDURE — 3079F DIAST BP 80-89 MM HG: CPT | Mod: CPTII,S$GLB,, | Performed by: ORTHOPAEDIC SURGERY

## 2022-12-16 PROCEDURE — 99204 OFFICE O/P NEW MOD 45 MIN: CPT | Mod: S$GLB,,, | Performed by: ORTHOPAEDIC SURGERY

## 2022-12-16 PROCEDURE — 99999 PR PBB SHADOW E&M-EST. PATIENT-LVL III: ICD-10-PCS | Mod: PBBFAC,,, | Performed by: ORTHOPAEDIC SURGERY

## 2022-12-16 PROCEDURE — 3075F PR MOST RECENT SYSTOLIC BLOOD PRESS GE 130-139MM HG: ICD-10-PCS | Mod: CPTII,S$GLB,, | Performed by: ORTHOPAEDIC SURGERY

## 2022-12-16 PROCEDURE — 1159F MED LIST DOCD IN RCRD: CPT | Mod: CPTII,S$GLB,, | Performed by: ORTHOPAEDIC SURGERY

## 2022-12-16 PROCEDURE — 3008F PR BODY MASS INDEX (BMI) DOCUMENTED: ICD-10-PCS | Mod: CPTII,S$GLB,, | Performed by: ORTHOPAEDIC SURGERY

## 2022-12-16 PROCEDURE — 3079F PR MOST RECENT DIASTOLIC BLOOD PRESSURE 80-89 MM HG: ICD-10-PCS | Mod: CPTII,S$GLB,, | Performed by: ORTHOPAEDIC SURGERY

## 2022-12-16 PROCEDURE — 1160F PR REVIEW ALL MEDS BY PRESCRIBER/CLIN PHARMACIST DOCUMENTED: ICD-10-PCS | Mod: CPTII,S$GLB,, | Performed by: ORTHOPAEDIC SURGERY

## 2022-12-16 PROCEDURE — 97110 THERAPEUTIC EXERCISES: CPT

## 2022-12-16 PROCEDURE — 1160F RVW MEDS BY RX/DR IN RCRD: CPT | Mod: CPTII,S$GLB,, | Performed by: ORTHOPAEDIC SURGERY

## 2022-12-16 PROCEDURE — 1159F PR MEDICATION LIST DOCUMENTED IN MEDICAL RECORD: ICD-10-PCS | Mod: CPTII,S$GLB,, | Performed by: ORTHOPAEDIC SURGERY

## 2022-12-16 NOTE — PROGRESS NOTES
"CC: bilateral low back pain    52 y.o. Male presents today for evaluation of his bilateral low back and neck pain, referred by Dr. Amin for possible OMT. He admits to a long history of chronic low back and neck pain that he attributes to years working physical jobs and as a . He admits to increased neck pain with tilting his head to the left and notes feeling dizzy, disoriented and appreciating pressure in his eyes. He gestures to the midline of his neck when asked where he hurts and indicates his pain radiates distally to the trapezius muscle bilaterally.  How long: Many years, worse over the past 2 years   What makes it better: Admits to marginal improvement with injections  What makes it worse: Laying supine, working as a   Does it radiate: Admits to radiating pain  Numbness/tingling down legs: Admits to numbness/tingling  Attempted treatments: Admits to taking ibuprofen as needed in addition to many injections which have been somewhat helpful. He admits to attending one appointment for healthy back and feels as though this caused a severe exacerbation in his pain.    Pain score: 8/10   Any mechanical symptoms: Denies  Feelings of instability: Denies  Problems with ADLs: Admits to this problem affecting his ability to perform ADLs      PAST MEDICAL HISTORY:   Past Medical History:   Diagnosis Date    Anxiety     Back pain     Chronic back pain     sylvester commented it has been "years" and is related to the "type of hard work" he has done all his life and that it is an ongoing pain he deals with daily     Colon polyp     Depressive disorder, not elsewhere classified 11/25/2014    Perirectal fistula     Seizures     "about 7 years ago, had 3 episodes and has not had a seizure in over 2 years" patient spoke in length related to not having a seizure disorder and felt unheard by many doctors when he described his events 7 years ago to his doctors       PAST SURGICAL HISTORY:   Past Surgical History: "   Procedure Laterality Date    BACK SURGERY      2003/2004    CAUDAL EPIDURAL STEROID INJECTION N/A 10/10/2019    Procedure: Injection-steroid-epidural-caudal;  Surgeon: Amol Grady Jr., MD;  Location: [facility] OR;  Service: Pain Management;  Laterality: N/A;    CAUDAL EPIDURAL STEROID INJECTION N/A 07/16/2020    Procedure: Injection-steroid-epidural-caudal;  Surgeon: Amol Grady Jr., MD;  Location: [facility] OR;  Service: Pain Management;  Laterality: N/A;    CAUDAL EPIDURAL STEROID INJECTION N/A 03/03/2022    CAUDAL EPIDURAL STEROID INJECTION N/A 03/03/2022    Procedure: Injection-steroid-epidural-caudal;  Surgeon: Amol Grady Jr., MD;  Location: [facility] PAIN MGMT;  Service: Pain Management;  Laterality: N/A;    COLONOSCOPY N/A 08/22/2016    Procedure: COLONOSCOPY;  Surgeon: TROY Inman MD;  Location: [facility] (4TH FLR);  Service: Endoscopy;  Laterality: N/A;    EPIDURAL STEROID INJECTION N/A 03/18/2021    Procedure: Injection, Steroid, Epidural---T1-2;  Surgeon: Amol Grady Jr., MD;  Location: [facility] OR;  Service: Pain Management;  Laterality: N/A;    EPIDURAL STEROID INJECTION INTO CERVICAL SPINE N/A 11/17/2022    Procedure: Injection-steroid-epidural-cervical---INTERLAMINAR C7-T1;  Surgeon: Amol Grady Jr., MD;  Location: [facility] PAIN MGMT;  Service: Pain Management;  Laterality: N/A;    STEPHENIE  03/18/2021    EXAMINATION UNDER ANESTHESIA N/A 10/12/2018    Procedure: Exam under anesthesia;  Surgeon: TROY Inman MD;  Location: [facility] OR 2ND FLR;  Service: Colon and Rectal;  Laterality: N/A;    fistula surgery      perirectal    HAND SURGERY Left     INJECTION OF ANESTHETIC AGENT AROUND MEDIAL BRANCH NERVES INNERVATING LUMBAR FACET JOINT Bilateral 08/18/2022    Procedure: Block-nerve-medial branch-lumbar---BILATERAL L2, L3, L4;  Surgeon: Amol Grady Jr., MD;  Location: [facility] PAIN MGMT;  Service: Pain Management;  Laterality: Bilateral;    INJECTION OF ANESTHETIC AGENT AROUND  MEDIAL BRANCH NERVES INNERVATING LUMBAR FACET JOINT Bilateral 09/01/2022    Procedure: Block-nerve-medial branch-lumbar---BILATERAL L2, L3, L4;  Surgeon: Amol Grady Jr., MD;  Location: [facility] PAIN MGMT;  Service: Pain Management;  Laterality: Bilateral;    Injection-steroid-epidural-cervical---INTERLAMINAR C7-T1  11/17/2022    INSERTION OF SETON STITCH N/A 10/12/2018    Procedure: PLACEMENT, SETON STITCH;  Surgeon: TROY Inman MD;  [facility];  Service: Colon and Rectal;  Laterality: N/A;    LEG SURGERY      plate    LEG SURGERY Right 1993       FAMILY HISTORY:   History reviewed. No pertinent family history.    SOCIAL HISTORY:   Social History     Socioeconomic History    Marital status: Single   Occupational History    Occupation: Maintenance with [employer]     Employer: [employer]    Tobacco Use    Smoking status: Every Day     Packs/day: 1.00     Years: 30.00     Pack years: 30.00     Types: Cigarettes    Smokeless tobacco: Never   Substance and Sexual Activity    Alcohol use: Yes     Comment: beer occasionally     Drug use: Yes     Types: Marijuana    Sexual activity: Yes       MEDICATIONS:     Current Outpatient Medications:     ibuprofen (ADVIL,MOTRIN) 800 MG tablet, Take 1 tablet (800 mg total) by mouth 3 (three) times daily., Disp: 90 tablet, Rfl: 1  No current facility-administered medications for this visit.    Facility-Administered Medications Ordered in Other Visits:     0.9%  NaCl infusion, , Intravenous, Continuous, Amol Grady Jr., MD, New Bag at 07/16/20 0843    ALPRAZolam dissolvable tablet 1 mg, 1 mg, Oral, Once PRN, Amol Grady Jr., MD    dexAMETHasone sodium phos (PF) injection 10 mg, 10 mg, Epidural, Once, Amol Grady Jr., MD    dexAMETHasone sodium phos (PF) injection 10 mg, 10 mg, Epidural, Once, Amol Grady Jr., MD    fentaNYL injection 100 mcg, 100 mcg, Intravenous, PRN, Amol Grady Jr., MD, 50 mcg at 07/16/20  "0903    lidocaine (PF) 10 mg/ml (1%) injection 10 mg, 1 mL, Other, Once, Amol Grady Jr., MD    LIDOcaine (PF) 10 mg/ml (1%) injection 10 mg, 1 mL, Other, Once, Amol Grady Jr., MD    LIDOcaine (PF) 20 mg/mL (2%) injection 200 mg, 10 mL, Other, Once, Amol Grady Jr., MD    lidocaine HCL 20 mg/ml (2%) injection 10 mL, 10 mL, Other, Once, Amol Grady Jr., MD    midazolam (VERSED) 1 mg/mL injection 5 mg, 5 mg, Intravenous, PRN, Amol Grady Jr., MD, 1 mg at 07/16/20 0900    ALLERGIES:   Review of patient's allergies indicates:   Allergen Reactions    Latex, natural rubber Hives     Swelling/Hives.        PHYSICAL EXAMINATION:  /83   Pulse 78   Ht 6' 2" (1.88 m)   Wt 90.7 kg (200 lb)   BMI 25.68 kg/m²   Vitals signs and nursing note have been reviewed.  General: In no acute distress, well developed, well nourished, no diaphoresis  Eyes: EOM full and smooth, no eye redness or discharge  HENT: normocephalic and atraumatic, neck supple, trachea midline, no nasal discharge, no external ear redness or discharge  Cardiovascular: no LE edema  Lungs: respirations non-labored, no conversational dyspnea   Abd: non-distended, no rigidity  MSK: no amputation or deformity, no swelling of extremities  Neuro: AAOx3, CN2-12 grossly intact  Skin: No rashes, warm and dry  Psychiatric: cooperative, pleasant, mood and affect appropriate for age    Lumbar Spine: bilateral lumbar region    Observation:    Normal cervicothoracolumbar curves.    No obvious pelvic obliquity while standing.    No edema, erythema, or ecchymosis noted in lumbosacral region.    No midline skin abnormalities.    No atrophy of lower limb musculature.    Tenderness:  + tenderness throughout the cervical, thoracic, lumbar spine, iliolumbar region, posterior pelvis.  + tenderness over the sacrum, piriformis, greater/lesser trochanters.  No bony deformities or step-offs palpated.     Strength Testing:  Hip flexion - 5/5 on " left and 5/5 on right  Hip extension - 5/5 on left and 5/5 on right  Knee flexion - 5/5 on left and 5/5 on right  Knee extension - 5/5 on left and 5/5 on right  Dorsiflexion - 5/5 on left and 5/5 on right  Plantarflexion - 5/5 on left and 5/5 on right  Great toe extension - 5/5 on left and 5/5 on right    Neurovascular Exam:  No pretibial edema or abnormal hair pattern of the shin.    Intact and symmetric DP and PT pulses bilaterally.  Normal gait without trendelenberg, heel walking, toe walking, and tandem walking.    IMAGIN. X-ray obtained 10/28/2022 due to bilateral low back pain   2. X-ray images were reviewed personally by me and then directly with patient.  3. FINDINGS: X-ray images obtained demonstrate mild dextroconvex curvature of the lumbar spine, baseline DJD  4. IMPRESSION: As above.     1. MRI obtained 2022 due to cervical spine pain   2. MRI images were reviewed personally by me and then directly with patient.  3. FINDINGS: MRI images obtained demonstrate multilevel degenerative changes, moderate spinal canal stenosis with chronic focal cord signal abnormality at C3-4 compatible with myelomalacia, flattening of the right ventral cord at C5-6 with short segment focal cord abnormality suggestive of myelomalcia, spinal canal stenosis at C3-4 and C6-7, moderate to severe neural foraminal stenosis at C2-3 through C6-7  4. IMPRESSION: As above.         ASSESSMENT:      ICD-10-CM ICD-9-CM   1. Chronic bilateral low back pain without sciatica  M54.50 724.2    G89.29 338.29   2. Chronic neck pain  M54.2 723.1    G89.29 338.29       PLAN:  1-2.  Chronic bilateral low back pain/chronic neck pain -     - Anmol admits to a long history of chronic neck and low back pain that he attributes to years working physical jobs and his work as a . He has had numerous injections with marginal improvement. He was referred by Dr. Amin for possible OMT.     - XRs obtained 10/28/2022 and images were personally  reviewed with the patient. See above for further detail.    - Prior notes and procedures regarding his neck and low back pain/interventions have been reviewed.    - Anmol is in severe pain following his first physical therapy appointment today.  Based on his history, exam, response to physical therapy, at this time he is a poor OMT candidate.  He has extensive and severe pathology in addition to abnormal symptoms dizziness associated with certain neck motion.      Future planning includes - next steps pending Dr. Amin and Dr. Grady    All questions were answered to the best of my ability and all concerns were addressed at this time.    Follow up with Dr. Amin and Dr. Grady.      This note is dictated using the M*Modal Fluency Direct word recognition program. There are word recognition mistakes that are occasionally missed on review.      Total time spent face-to face with patient counseling or coordinating care including prognosis, differential diagnosis, risks and benefits of treatment, instructions, compliance risk reductions as well as non-face-to-face time personally spent reviewing medial record, medical documentation, and coordination of care.     EST MINUTES X   99627 10-19    [id number] 20-29    [id number] 30-39    [id number] 40-54    NEW     [id number] 15-29    [id number] 30-44    [id number] 45-59 X   [id number] 60-74    PHONE      5-10    [id number] 11-20    [id number] 21-30

## 2022-12-19 PROBLEM — R29.3 POOR POSTURE: Status: ACTIVE | Noted: 2022-12-19

## 2022-12-19 PROBLEM — Z74.09 IMPAIRED FUNCTIONAL MOBILITY, BALANCE, GAIT, AND ENDURANCE: Status: ACTIVE | Noted: 2022-12-19

## 2022-12-19 PROBLEM — R29.898 WEAKNESS OF BOTH LOWER EXTREMITIES: Status: ACTIVE | Noted: 2022-12-19

## 2022-12-19 PROBLEM — M25.69 DECREASED RANGE OF MOTION OF TRUNK AND BACK: Status: ACTIVE | Noted: 2022-12-19

## 2022-12-19 PROBLEM — R29.898 DECREASED STRENGTH OF TRUNK AND BACK: Status: ACTIVE | Noted: 2022-12-19

## 2022-12-20 NOTE — PLAN OF CARE
"  [facility] - PHYSICAL THERAPY LUMBAR EVALUATION     Name: Anmol Angeles Jr.  Clinic Number: [id number]    Therapy Diagnosis:   Encounter Diagnoses   Name Primary?    Chronic neck pain     Chronic bilateral low back pain without sciatica     Decreased range of motion of trunk and back     Decreased strength of trunk and back     Poor posture     Weakness of both lower extremities     Impaired functional mobility, balance, gait, and endurance      Physician: Ian Amin DO    Physician Orders: PT Eval and Treat   Medical Diagnosis from Referral:   M54.2,G89.29 (ICD-10-CM) - Chronic neck pain   M54.50,G89.29 (ICD-10-CM) - Chronic bilateral low back pain without sciatica     Evaluation Date: 12/16/2022  Authorization Period Expiration: 10/26/2023  Plan of Care Expiration: 3/16/2023  Reassessment Due: 1/16/2023  Visit # / Visits authorized: 1/ 1 (pending)    Time In: 10:10 am  Time Out: 11:30 am  Total Billable Time: 80 minutes  Insurance: Fee for service Insurance Patient      Precautions: Standard and Fall    Pattern of pain determined: 1 (possible 5)      Subjective   Date of onset: 10+ years  History of current condition - Anmol reports: he has had chronic back pain pain for over a decade. The pain is in the bilateral low back and into bilateral buttocks, thighs, and into the calves. With certain head and neck positions he will start to get dizziness and feel "cattywompus". He also reports a numb pain from his back that goes into his legs. He has had relief with cervical STEPHENIE, but not as much relief with lumbar STEPHENIE. The pain will be so bad that he will not be able to tanner stand up straight and sometimes with sneezing, coughing, laughing he will be down for days. He will get shooting numbness and pain down his legs. Aggravating factors include sitting, walking, standing, lifting, bending, doing the litter box, vacuuming. He has previously done therapy, but felt that it made him worse. He reports L4/L5 " "surgery right before Jodi. HE reports min relief with ice, heat, medication, TENS,      Medical History:   Past Medical History:   Diagnosis Date    Anxiety     Back pain     Chronic back pain     sylvester commented it has been "years" and is related to the "type of hard work" he has done all his life and that it is an ongoing pain he deals with daily     Colon polyp     Depressive disorder, not elsewhere classified 11/25/2014    Perirectal fistula     Seizures     "about 7 years ago, had 3 episodes and has not had a seizure in over 2 years" patient spoke in length related to not having a seizure disorder and felt unheard by many doctors when he described his events 7 years ago to his doctors       Surgical History:   Anmol Angeles Jr.  has a past surgical history that includes fistula surgery; Leg Surgery; Colonoscopy (N/A, 08/22/2016); Leg Surgery (Right, 1993); Hand surgery (Left); Examination under anesthesia (N/A, 10/12/2018); Insertion of seton stitch (N/A, 10/12/2018); Caudal epidural steroid injection (N/A, 10/10/2019); Back surgery; Caudal epidural steroid injection (N/A, 07/16/2020); STEPHENIE (03/18/2021); Epidural steroid injection (N/A, 03/18/2021); Caudal epidural steroid injection (N/A, 03/03/2022); Caudal epidural steroid injection (N/A, 03/03/2022); Injection of anesthetic agent around medial branch nerves innervating lumbar facet joint (Bilateral, 08/18/2022); Injection of anesthetic agent around medial branch nerves innervating lumbar facet joint (Bilateral, 09/01/2022); Injection-steroid-epidural-cervical---INTERLAMINAR C7-T1 (11/17/2022); and Epidural steroid injection into cervical spine (N/A, 11/17/2022).    Medications:   Anmol has a current medication list which includes the following prescription(s): ibuprofen, and the following Facility-Administered Medications: sodium chloride 0.9%, alprazolam, dexamethasone sodium phos (pf), dexamethasone sodium phos (pf), fentanyl, lidocaine (pf) 10 " mg/ml (1%), lidocaine (pf) 10 mg/ml (1%), lidocaine (pf) 20 mg/ml (2%), lidocaine hcl 20 mg/ml (2%), and midazolam.    Allergies:   Review of patient's allergies indicates:   Allergen Reactions    Latex, natural rubber Hives     Swelling/Hives.        Imaging: XR LUMBAR SPINE AP AND LAT WITH FLEX/EXT     CLINICAL HISTORY:  Other intervertebral disc degeneration, lumbar region     TECHNIQUE:  AP and lateral views as well as lateral flexion and extension images are performed through the lumbar spine.     COMPARISON:  06/01/2020     FINDINGS:  Lumbar vertebral body heights are maintained.  Disc space narrowing L4-5 and L5-S1.  Posterior osteophyte L4-5.  Facet arthropathy L3-4 through L5-S1.  AP alignment is anatomic.     Impression:     No acute osseous abnormality seen.  Stable degenerative change lower lumbar spine.    MRI LUMBAR SPINE WITHOUT CONTRAST     CLINICAL HISTORY:  lumbar radiculopathy; Other intervertebral disc degeneration, lumbar region     TECHNIQUE:  Multiplanar, multisequence MR images were acquired from the thoracolumbar junction to the sacrum without the administration of contrast.     COMPARISON:  09/12/2019     FINDINGS:  The marrow demonstrates homogeneous signal.  Vertebral body heights are maintained.  Disc space narrowing and endplate changes most pronounced L4-5 and L5-S1. Conus terminates appropriately at T12-L1.     Multilevel degenerative change as diesel below:     L1-2: Facet arthropathy with mild bilateral neural foraminal narrowing.     L2-3: Diffuse posterior broad-based disc bulge, ligamentum flavum hypertrophy, and facet hypertrophic changes contribute to moderate canal and moderate left neural foraminal narrowing.  There may be a superimposed central disc protrusion, decreased in size compared to prior.     L3-4: Diffuse posterior broad-based disc bulge, ligamentum flavum hypertrophy, and facet hypertrophic changes contribute to moderate-severe canal narrowing.  Severe left and  "moderate right neural foraminal narrowing.     L4-5: Diffuse posterior broad-based disc bulge, ligamentum flavum hypertrophy, and facet hypertrophic changes.  Previous right laminectomy.  No significant narrowing of the central canal.  Neural foraminal narrowing, right greater than left.     L5-S1: Small diffuse posterior broad-based disc bulge without significant canal narrowing.  Moderate bilateral neural foraminal narrowing.     Impression:     Multilevel degenerative change with no detrimental change compared to prior.     Continued canal narrowing L2-3 and L3-4.  Disc protrusion at L2-3 is decreased in mildly decreased in size with improved canal narrowing.     Multilevel neural foraminal narrowing, moderate to severe, as given in detail above.    Prior Therapy: yes, but no relief  Prior Treatment: surgeries, and injections  Social History: lives alone  Occupation:    Leisure: riding his motorcycle      Prior Level of Function: independent   Current Level of Function: increased pain and decreased tolerance to sitting, walking, standing, lifting, bending, doing the litter box, vacuuming  DME owned/used: none        Pain:  Current 7/10, worst 10/10, best 6/10   Location: bilateral back , buttocks , lower legs, and upper legs  Description: Aching, Dull, Tingling, and stabbing  Aggravating Factors: sitting, walking, standing, lifting, bending, doing the litter box, vacuuming  Easing Factors: ice, heat, medication, TENS - minimal  Disturbed Sleep: yes getting to sleep is really hard      Pattern of pain questions:  1.  Where is your pain the worst? Bilateral back  2.  Is your pain constant or intermittent? constant  3.  Does bending forward make your typical pain worse? yes  4.  Since the start of your back pain, has there been a change in your bowel or bladder? no  5.  What can't you do now that you use to be able to do? I can do everything it just hurts and I push through       Pts goals: "I dont want to " "feel this way anymore"      Red Flag Screening:   Cough  Sneeze  Strain: (+)  Bladder/ bowel: (--)  Falls: (--)  Night pain: (--)  Unexplained weight loss: (--)  General health: "good besides my neck and back"    OBJECTIVE     Postural examination/scapula alignment: Rounded shoulder, Head forward, Slouched posture, Increased kyphosis, and Decreased lordosis  Joint integrity: Hard end feel - pain with all CPA's  Skin integrity: intact  Edema: none  Sitting: poor, fixed flexed posture  Standing: poor, fixed flexed posture  Correction of posture: no effect with lumbar roll    MOVEMENT LOSS    ROM Loss   Flexion major loss   Extension major loss   Side glide Right major loss   Side glide Left major loss   Rotation Right major loss   Rotation Left major loss     Lower Extremity Strength  Right LE  Left LE    Hip flexion: 4-/5 Hip flexion: 4-/5   Hip extension:  3-/5 Hip extension: 3-/5   Hip abduction: 3-/5 Hip abduction: 3-/5   Hip adduction:  3-/5 Hip adduction:  3-/5   Hip External Rotation 4-/5 Hip External Rotation 4-/5   Hip Internal rotation   4-/5 Hip Internal rotation 4-/5   Knee Flexion 4/5 Knee Flexion 4/5   Knee Extension 4-/5 Knee Extension 4-/5   Ankle dorsiflexion: 4/5 Ankle dorsiflexion: 4/5   Ankle plantarflexion: 4/5 Ankle plantarflexion: 4/5       GAIT:  Assistive Device used: none  Level of Assistance: supervision  Patient displays the following gait deviations:  unsteady gait, decreased step length, increased base of support, antalgic gait, and flexed posture, shuffle gait.     Special Tests:   Test Name  Test Result   Prone Instability Test (--)   SI Joint Provocation Test (--)   Straight Leg Raise (+)   Neural Tension Test (--)   Crossed Straight Leg Raise (+)   Walking on toes (+)   Walking on heels  (+)       NEUROLOGICAL SCREENING     Sensory deficit: impaired to light touch    Reflexes:    Left Right   Patella Tendon absent absent   Achilles Tendon absent absent   Babinski  (--) (--)   Clonus " "(--) (--)     REPEATED TEST MOVEMENTS:    Baseline symptoms:  Repeated Flexion in Standing worse   Repeated Extension in Standing worse   Repeated Flexion in lying better   Repeated Extension in lying  worse       STATIC TESTS and other movements:   Baseline symptoms:  Prone lie worse   Prone lie on elbows worse   Sustained prone with  using mat worse   Sustained flexion worse   Sitting slouched  worse   Sitting erect worse   Standing slouched worse   Standing erect  worse   Lying prone in extension  worse   Long sitting   worse       Baseline Isometric Testing on Med X equipment: Testing administered by PT  Date of testin2022  ROM 9-42 deg   Max Peak Torque 125    Min Peak Torque 65    Flex/Ext Ratio 1.9:1   % below normative data 50     Starting weight 50# (may need to go less if unable)    Limitation/Restriction for FOTO lumbar Survey    Therapist reviewed FOTO scores for BrendenAngelo Angeles Jr. on 2022.   FOTO documents entered into [employer] - see Media section.    Limitation Score: 59%    Goal: 45%             Treatment   Treatment Time In: 10:50  Treatment Time Out: 11:30  Total Treatment time separate from Evaluation: 40 minutes      Anmol received therapeutic exercises to develop/improve posture, lumbar/cervical ROM, strength and muscular endurance for 30 minutes including the following exercises:     Medx lumbar isometric strength testing    LTRs x20  DKTC 10x5"  Piriformis stretch 2x30"    Add PPT and hamstring stretch NV    [facility] - Short 2022   Visit Number 1   VAS Pain Rating 7   Flexion in Lying 10   Lumbar Extension - Seat Pad 0   Femur Restraint 6   Top Dead Center 24   Counterweight 139   Lumbar Flexion 42   Lumbar Extension 9   Lumbar Peak Torque 125   Lumbar Weight 45   Repetitions 5         Written Home Exercises Provided: yes.  Exercises were reviewed and Anmol was able to demonstrate them prior to the end of the session.  Anmol demonstrated good  understanding of the " education provided.     See EMR under Patient Instructions for exercises provided 12/16/2022.      Education provided:   - Patient received education regarding proper posture and body mechanics.  Patient was given top [facility] Visit 1 handouts which discuss what to expect in therapy, the purpose and opportunity for health coaching, the program,  wellness when discharged from therapy, back education and care specifically for posture seated, standing, lifting correctly, components of exercise, importance of nutrition and hydration, and importance of sleep.   Information on lumbar rolls provided.  - Dashawn roll tried, recommended, and purchase information was provided.    - Patient received a handout regarding anticipated muscular soreness following the isometric test and strategies for management were reviewed with patient including stretching, using ice and scheduled rest.   - Patient received education on the Healthy Back program, purpose of the isometric test, progression of back strengthening as well as wellness approach and systemic strengthening.  Details of the program were discussed.  Reviewed that patient should feel support/pressure from med ex restraints but no pain or discomfort and patient expressed understanding.  Med x dynamic exercise and baseline IM test performed with instructions to guide the patient safely through the isometric testing.  Patient informed to perform isometric test correctly, and safely, building best force they safely can and not pushing through pain.  Patient demonstrated understanding of information      Anmol received cold pack for 0 minutes to low back. - pt declined ice    Assessment   Anmol is a 52 y.o. male referred to [facility] with a medical diagnosis of M54.50,G89.29 (ICD-10-CM) - Chronic bilateral low back pain without sciatica. Pt presents with signs and symptoms consistent with diagnosis including decreased range of motion of lumbar spine,  weakness of trunk and back, poor posture, bilateral lower extremity weakness, decreased joint mobility, decreased soft tissue flexibility and mobility, gait deviations, poor balance, altered sensation, and decreased functional mobility tolerance. These deficits are limiting patient in full participation in ADL's and leisure activities such as sitting, walking, standing, lifting, bending, doing the litter box, vacuuming. Pt is 50% below normative values with medx lumbar isometric strength testing. Pt also with severe neck pain and is a candidate for healthy back cervical program upon completion of healthy back.     Pain Pattern: 1 (possible 5)       Pt prognosis is Fair.   Pt will benefit from skilled outpatient Physical Therapy to address the deficits stated above and in the chart below, provide pt/family education, and to maximize pt's level of independence. Based on the above history and physical examination an active physical therapy program is recommended.  Pt will continue to benefit from skilled outpatient physical therapy to address the deficits listed below in the chart, provide pt/family education and to maximize pt's level of independence in the home and community environment. .       Plan of care discussed with patient: Yes  Pt's spiritual, cultural and educational needs considered and patient is agreeable to the plan of care and goals as stated below:     Anticipated Barriers for therapy: chronicity of condition, concomitant neck pain, poor functional mobility tolerance    PT Evaluation Completed? Yes    Medical necessity is demonstrated by the following problem list.    Pt presents with the following impairments:     History  Co-morbidities and personal factors that may impact the plan of care Co-morbidities:   anxiety, coping style/mechanism, depression, difficulty sleeping, excessive commute time/distance, prior lumbar surgery, and seizures, migraines, vertigo    Personal Factors:   coping  style  attitudes     moderate   Examination  Body Structures and Functions, activity limitations and participation restrictions that may impact the plan of care Body Regions:   head  neck  back  lower extremities  upper extremities  trunk    Body Systems:    gross symmetry  ROM  strength  gross coordinated movement  balance  gait  transfers  transitions  motor control  motor learning    Participation Restrictions:   See above    Activity limitations:   Learning and applying knowledge  no deficits    General Tasks and Commands  no deficits    Communication  no deficits    Mobility  lifting and carrying objects  walking  driving (bike, car, motorcycle)    Self care  no deficits    Domestic Life  shopping  cooking  doing house work (cleaning house, washing dishes, laundry)  assisting others    Interactions/Relationships  no deficits    Life Areas  no deficits    Community and Social Life  no deficits         moderate   Clinical Presentation evolving clinical presentation with changing clinical characteristics moderate   Decision Making/ Complexity Score: moderate       GOALS: Pt is in agreement with the following goals.    Short term goals:  6 weeks or 10 visits   1.  Pt will demonstrate increased lumbar ROM by at least 6 degrees from the initial ROM value with improvements noted in functional ROM and ability to perform ADLs.  (approp and ongoing)  2.  Pt will demonstrate increased MedX average isometric strength value  by 20% from initial test resulting in improved ability to perform bending, lifting, and carrying activities safely, confidently.  (approp and ongoing)  3.  Patient report a reduction in worst pain score by 1-2 points for improved tolerance for walking.  (approp and ongoing)  4.  Pt able to perform HEP correctly with minimal cueing or supervision from therapist to encourage independent management of symptoms. (approp and ongoing)      Long term goals: 10 weeks or 20 visits   1. Pt will demonstrate  "increased lumbar ROM by at least 9 degrees from initial ROM value, resulting in improved ability to perform functional fwd bending while standing and sitting. (approp and ongoing)  2. Pt will demonstrate increased MedX average isometric strength value  by 40% from initial test resulting in improved ability to perform bending, lifting, and carrying activities safely, confidently.  (approp and ongoing)  3. Pt to demonstrate ability to independently control and reduce their pain through posture positioning and mechanical movements throughout a typical day.  (approp and ongoing)  4.  Pt will demonstrate reduced pain and improved functional outcomes as reported on the FOTO by reaching a limitation score of < or = 45% or less in order to demonstrate subjective improvement in pt's condition.    (approp and ongoing)  5. Pt will demonstrate independence with the HEP at discharge  (approp and ongoing)  6. Pt will be able to walk for 20 minutes without increased low back pain (approp and ongoing)      Plan   Outpatient physical therapy 2x week for 10 weeks or 20 visits to include the following:   - Patient education  - Therapeutic exercise  - Manual therapy  - Performance testing   - Neuromuscular Re-education  - Therapeutic activity   - Modalities    Pt may be seen by PTA as part of the rehabilitation team.     Therapist: Partha Felder, PT  12/19/2022    "I certify the need for these services furnished under this plan of treatment and while under my care."    ____________________________________  Physician/Referring Practitioner    _______________  Date of Signature        "

## 2023-03-20 NOTE — LETTER
September 9, 2019      Efrain Baeza MD  [address]  [facility] [address]           [address]  [address], [address]  [facility] [address]  Phone: [phone]  Fax: [phone]          Patient: Anmol Angeles Jr.   MR Number: [id number]   YOB: 1970   Date of Visit: 9/9/2019       Dear Dr. Efrain Baeza:    Thank you for referring Anmol Angeles to me for evaluation. Attached you will find relevant portions of my assessment and plan of care.    If you have questions, please do not hesitate to call me. I look forward to following Anmol Angeles along with you.    Sincerely,    SAYRA Torresosure  CC:  No Recipients    If you would like to receive this communication electronically, please contact [email or web address] or [phone] to request more information on [employer] access.    For providers and/or their staff who would like to refer a patient to [facility], please contact us through our one-stop-shop provider referral line, [facility], at [phone].    If you feel you have received this communication in error or would no longer like to receive these types of communications, please e-mail [email or web address]         
no ear pain/no nasal congestion/no throat pain

## 2023-09-28 ENCOUNTER — TELEPHONE (OUTPATIENT)
Dept: NEUROSURGERY | Facility: CLINIC | Age: 53
End: 2023-09-28
Payer: MEDICARE

## 2023-09-28 DIAGNOSIS — M48.02 SPINAL STENOSIS, CERVICAL REGION: Primary | ICD-10-CM

## 2023-09-28 DIAGNOSIS — M47.812 CERVICAL SPONDYLOSIS: ICD-10-CM

## 2023-09-28 NOTE — TELEPHONE ENCOUNTER
Called patient and scheduled xray & appt.       ----- Message from Karina Connell sent at 9/28/2023  4:21 PM CDT -----  Regarding: Appt  Contact: Pt [phone]  Pt calling to schedule appt for neck stiffness. Pt was originally scheduled with Dr. Almaguer but visit was cancelled stating wrong provider. Please call [phone]

## 2023-09-28 NOTE — TELEPHONE ENCOUNTER
Left voicemail for patient to call back for scheduling.     ----- Message -----  From: Cassy Benito  Sent: 9/26/2023   8:00 AM CDT  To: [facility] Sosa Staff  Subject: Appt                                             Patient is calling to schedule appointment doctor name isn't showing up on providers list. Please contact pt

## 2023-10-06 ENCOUNTER — OFFICE VISIT (OUTPATIENT)
Dept: NEUROSURGERY | Facility: CLINIC | Age: 53
End: 2023-10-06
Payer: MEDICARE

## 2023-10-06 ENCOUNTER — HOSPITAL ENCOUNTER (OUTPATIENT)
Dept: RADIOLOGY | Facility: HOSPITAL | Age: 53
Discharge: HOME | End: 2023-10-06
Attending: NURSE PRACTITIONER
Payer: MEDICARE

## 2023-10-06 VITALS
SYSTOLIC BLOOD PRESSURE: 140 MMHG | WEIGHT: 200 LBS | HEART RATE: 73 BPM | BODY MASS INDEX: 25.68 KG/M2 | DIASTOLIC BLOOD PRESSURE: 88 MMHG

## 2023-10-06 DIAGNOSIS — M47.812 CERVICAL SPONDYLOSIS: ICD-10-CM

## 2023-10-06 DIAGNOSIS — M48.02 SPINAL STENOSIS, CERVICAL REGION: ICD-10-CM

## 2023-10-06 DIAGNOSIS — M19.90 ARTHRITIS: ICD-10-CM

## 2023-10-06 DIAGNOSIS — G89.29 CHRONIC NECK PAIN: ICD-10-CM

## 2023-10-06 DIAGNOSIS — M54.2 CHRONIC NECK PAIN: ICD-10-CM

## 2023-10-06 DIAGNOSIS — M48.02 SPINAL STENOSIS, CERVICAL REGION: Primary | ICD-10-CM

## 2023-10-06 PROCEDURE — 99213 PR OFFICE/OUTPT VISIT, EST, LEVL III, 20-29 MIN: ICD-10-PCS | Mod: S$GLB,,, | Performed by: NURSE PRACTITIONER

## 2023-10-06 PROCEDURE — 99999 PR PBB SHADOW E&M-EST. PATIENT-LVL III: CPT | Mod: PBBFAC,,, | Performed by: NURSE PRACTITIONER

## 2023-10-06 PROCEDURE — 1159F MED LIST DOCD IN RCRD: CPT | Mod: CPTII,S$GLB,, | Performed by: NURSE PRACTITIONER

## 2023-10-06 PROCEDURE — 3008F PR BODY MASS INDEX (BMI) DOCUMENTED: ICD-10-PCS | Mod: CPTII,S$GLB,, | Performed by: NURSE PRACTITIONER

## 2023-10-06 PROCEDURE — 72050 X-RAY EXAM NECK SPINE 4/5VWS: CPT | Mod: TC

## 2023-10-06 PROCEDURE — 3077F SYST BP >= 140 MM HG: CPT | Mod: CPTII,S$GLB,, | Performed by: NURSE PRACTITIONER

## 2023-10-06 PROCEDURE — 3008F BODY MASS INDEX DOCD: CPT | Mod: CPTII,S$GLB,, | Performed by: NURSE PRACTITIONER

## 2023-10-06 PROCEDURE — 3079F PR MOST RECENT DIASTOLIC BLOOD PRESSURE 80-89 MM HG: ICD-10-PCS | Mod: CPTII,S$GLB,, | Performed by: NURSE PRACTITIONER

## 2023-10-06 PROCEDURE — 3079F DIAST BP 80-89 MM HG: CPT | Mod: CPTII,S$GLB,, | Performed by: NURSE PRACTITIONER

## 2023-10-06 PROCEDURE — 3077F PR MOST RECENT SYSTOLIC BLOOD PRESSURE >= 140 MM HG: ICD-10-PCS | Mod: CPTII,S$GLB,, | Performed by: NURSE PRACTITIONER

## 2023-10-06 PROCEDURE — 99999 PR PBB SHADOW E&M-EST. PATIENT-LVL III: ICD-10-PCS | Mod: PBBFAC,,, | Performed by: NURSE PRACTITIONER

## 2023-10-06 PROCEDURE — 72050 X-RAY EXAM NECK SPINE 4/5VWS: CPT | Mod: 26,,, | Performed by: RADIOLOGY

## 2023-10-06 PROCEDURE — 99213 OFFICE O/P EST LOW 20 MIN: CPT | Mod: S$GLB,,, | Performed by: NURSE PRACTITIONER

## 2023-10-06 PROCEDURE — 1160F PR REVIEW ALL MEDS BY PRESCRIBER/CLIN PHARMACIST DOCUMENTED: ICD-10-PCS | Mod: CPTII,S$GLB,, | Performed by: NURSE PRACTITIONER

## 2023-10-06 PROCEDURE — 72050 XR CERVICAL SPINE AP LAT WITH FLEX EXTEN: ICD-10-PCS | Mod: 26,,, | Performed by: RADIOLOGY

## 2023-10-06 PROCEDURE — 1159F PR MEDICATION LIST DOCUMENTED IN MEDICAL RECORD: ICD-10-PCS | Mod: CPTII,S$GLB,, | Performed by: NURSE PRACTITIONER

## 2023-10-06 PROCEDURE — 1160F RVW MEDS BY RX/DR IN RCRD: CPT | Mod: CPTII,S$GLB,, | Performed by: NURSE PRACTITIONER

## 2023-10-06 RX ORDER — CYCLOBENZAPRINE HCL 10 MG
10 TABLET ORAL 2 TIMES DAILY PRN
COMMUNITY
Start: 2023-08-17

## 2023-10-06 NOTE — PROGRESS NOTES
"  Neurosurgery  Established Patient    SUBJECTIVE:     History of Present Illness (per Dr. Amin's note 10/26/22):  Patient returns to clinic to discuss his continued neck and low back pain. Today, he reports that his neck is bothering him more than his back pain. For the past 2-3 months, his neck discomfort has returned. He describes it as a "pressure" that starts posterior midline and radiates outward. He only experiences this pressure when he tilts his head to he left side. When he does so, he experiences disorientation, like his body is "not in tune" which can sometimes affect his vision and ability to screw bolts. Patient is a  and this affects his ability to work. He states that these sensations resolve rather quickly if he turns his head back towards the right. He denies radiculopathic and myelopathic symptoms in his BUE - he does not have any shooting pain or issues with writing, using his phone, or dropping things otherwise. He reports that neck injections about a year ago with Dr. Grady has helped with these neck issues. His back pain is constant and has not worsened or changed since his last visit.     Interval Hx 10/6/23: After the last appointment with Dr. Amin, it was recommended that the patient obtain additional injections and PT. He is being seen in clinic today to discuss progress with conservative treatment. States that he has not obtained any relief from the therapy or injections and felt as though the PT exacerbated his pain. He continues to struggle with pain on the left side of his neck and dizziness when he turns his head to the left.     Review of patient's allergies indicates:   Allergen Reactions    Latex, natural rubber Hives     Swelling/Hives.       Current Outpatient Medications   Medication Sig Dispense Refill    cyclobenzaprine (FLEXERIL) 10 MG tablet Take 10 mg by mouth 2 (two) times daily as needed.      ibuprofen (ADVIL,MOTRIN) 800 MG tablet Take 1 tablet (800 mg total) " "by mouth 3 (three) times daily. 90 tablet 1     No current facility-administered medications for this visit.     Facility-Administered Medications Ordered in Other Visits   Medication Dose Route Frequency Provider Last Rate Last Admin    0.9%  NaCl infusion   Intravenous Continuous Amol Grady Jr., MD   New Bag at 07/16/20 0843    ALPRAZolam dissolvable tablet 1 mg  1 mg Oral Once PRN Amol Grady Jr., MD        dexAMETHasone sodium phos (PF) injection 10 mg  10 mg Epidural Once Amol Grady Jr., MD        dexAMETHasone sodium phos (PF) injection 10 mg  10 mg Epidural Once Amol Grady Jr., MD        fentaNYL injection 100 mcg  100 mcg Intravenous PRN Amol Grady Jr., MD   50 mcg at 07/16/20 0903    lidocaine (PF) 10 mg/ml (1%) injection 10 mg  1 mL Other Once Amol Grady Jr., MD        LIDOcaine (PF) 10 mg/ml (1%) injection 10 mg  1 mL Other Once Amol Grady Jr., MD        LIDOcaine (PF) 20 mg/mL (2%) injection 200 mg  10 mL Other Once Amol Grday Jr., MD        lidocaine HCL 20 mg/ml (2%) injection 10 mL  10 mL Other Once Amol Grady Jr., MD        midazolam (VERSED) 1 mg/mL injection 5 mg  5 mg Intravenous PRN Amol Grady Jr., MD   1 mg at 07/16/20 0900       Past Medical History:   Diagnosis Date    Anxiety     Back pain     Chronic back pain     levitent commented it has been "years" and is related to the "type of hard work" he has done all his life and that it is an ongoing pain he deals with daily     Colon polyp     Depressive disorder, not elsewhere classified 11/25/2014    Perirectal fistula     Seizures     "about 7 years ago, had 3 episodes and has not had a seizure in over 2 years" patient spoke in length related to not having a seizure disorder and felt unheard by many doctors when he described his events 7 years ago to his doctors     Past Surgical History:   Procedure Laterality Date    BACK SURGERY      2003/2004 "    CAUDAL EPIDURAL STEROID INJECTION N/A 10/10/2019    Procedure: Injection-steroid-epidural-caudal;  Surgeon: Amol Grady Jr., MD;  Location: [facility] OR;  Service: Pain Management;  Laterality: N/A;    CAUDAL EPIDURAL STEROID INJECTION N/A 07/16/2020    Procedure: Injection-steroid-epidural-caudal;  Surgeon: Amol Grady Jr., MD;  Location: [facility] OR;  Service: Pain Management;  Laterality: N/A;    CAUDAL EPIDURAL STEROID INJECTION N/A 03/03/2022    CAUDAL EPIDURAL STEROID INJECTION N/A 03/03/2022    Procedure: Injection-steroid-epidural-caudal;  Surgeon: Amol Grady Jr., MD;  Location: [facility] PAIN MGMT;  Service: Pain Management;  Laterality: N/A;    COLONOSCOPY N/A 08/22/2016    Procedure: COLONOSCOPY;  Surgeon: TROY Inman MD;  Location: [facility] ENDO (4TH FLR);  Service: Endoscopy;  Laterality: N/A;    EPIDURAL STEROID INJECTION N/A 03/18/2021    Procedure: Injection, Steroid, Epidural---T1-2;  Surgeon: Amol Grady Jr., MD;  Location: [facility] OR;  Service: Pain Management;  Laterality: N/A;    EPIDURAL STEROID INJECTION INTO CERVICAL SPINE N/A 11/17/2022    Procedure: Injection-steroid-epidural-cervical---INTERLAMINAR C7-T1;  Surgeon: Amol Grady Jr., MD;  Location: [facility] PAIN MGMT;  Service: Pain Management;  Laterality: N/A;    STEPHENIE  03/18/2021    EXAMINATION UNDER ANESTHESIA N/A 10/12/2018    Procedure: Exam under anesthesia;  Surgeon: TROY Inman MD;  Location: [facility] OR 2ND FLR;  Service: Colon and Rectal;  Laterality: N/A;    fistula surgery      perirectal    HAND SURGERY Left     INJECTION OF ANESTHETIC AGENT AROUND MEDIAL BRANCH NERVES INNERVATING LUMBAR FACET JOINT Bilateral 08/18/2022    Procedure: Block-nerve-medial branch-lumbar---BILATERAL L2, L3, L4;  Surgeon: Amol Grady Jr., MD;  Location: [facility] PAIN MGMT;  Service: Pain Management;  Laterality: Bilateral;    INJECTION OF ANESTHETIC AGENT AROUND MEDIAL BRANCH NERVES INNERVATING LUMBAR FACET JOINT Bilateral  09/01/2022    Procedure: Block-nerve-medial branch-lumbar---BILATERAL L2, L3, L4;  Surgeon: Amol Grady Jr., MD;  Location: [facility] PAIN MGMT;  Service: Pain Management;  Laterality: Bilateral;    Injection-steroid-epidural-cervical---INTERLAMINAR C7-T1  11/17/2022    INSERTION OF SETON STITCH N/A 10/12/2018    Procedure: PLACEMENT, SETON STITCH;  Surgeon: TROY Inman MD;  Location: [facility] OR [facility] FLR;  Service: Colon and Rectal;  Laterality: N/A;    LEG SURGERY      plate    LEG SURGERY Right 1993     Family History    None       Social History     Socioeconomic History    Marital status: Single   Occupational History    Occupation: Maintenance with [employer]     Employer: [employer]    Tobacco Use    Smoking status: Every Day     Current packs/day: 1.00     Average packs/day: 1 pack/day for 30.0 years (30.0 ttl pk-yrs)     Types: Cigarettes    Smokeless tobacco: Never   Substance and Sexual Activity    Alcohol use: Yes     Comment: beer occasionally     Drug use: Yes     Types: Marijuana    Sexual activity: Yes       Review of Systems    OBJECTIVE:     Vital Signs  Pulse: 73  BP: (!) 140/88  Pain Score:   6  Weight: 90.7 kg (200 lb)  Body mass index is 25.68 kg/m².    Neurosurgery Physical Exam  General: well developed, well nourished, no distress.   Head: normocephalic, atraumatic  Neurologic: Alert and oriented. Thought content appropriate.  GCS: Motor: 6/Verbal: 5/Eyes: 4 GCS Total: 15  Mental Status: Awake, Alert, Oriented x 4  Language: No aphasia  Speech: No dysarthria  Cranial nerves: face symmetric, tongue midline, CN II-XII grossly intact.   Eyes: pupils equal, round, reactive to light with accomodation, EOMI.   Pulmonary: normal respirations, no signs of respiratory distress  Abdomen: soft, non-distended  Skin: Skin is warm, dry and intact.  Sensory: intact to light touch throughout  Motor Strength:Moves all extremities spontaneously with good tone.    Strength  Deltoids Triceps Biceps  Wrist Extension Wrist Flexion Hand    Upper: R 5/5 5/5 5/5 5/5 5/5 5/5    L 5/5 5/5 5/5 5/5 5/5 5/5     HF KE KF DF PF EHL   Lower: R 5/5 5/5 5/5 5/5 5/5 5/5    L 5/5 5/5 5/5 5/5 5/5 5/5     Reflexes:   Chris's: Negative.     Cerebellar:   Gait stable, fluid.   Tandem Gait: No difficulty  Able to walk on heels & toes     Cervical:   ROM: Pain limited with flexion, extension, lateral rotation and ear-to-shoulder bend.   Midline TTP: Positive    Diagnostic Results:  I have personally reviewed the multilevel degenerative changes.    ASSESSMENT/PLAN:   Anmol Angeles Jr. is a 53 y.o. male seen in clinic today to discuss progress with conservative treatment. States that he has not obtained any relief from the therapy or injections and felt as though the PT exacerbated his pain. He is eager to discuss surgical options. I have ordered an updated MRI cervical spine to evaluate for worsening stenosis. A referral to rheumatology has also been placed to evaluate his worsening arthritis in the joints in his hands. The patient would like to follow-up in clinic with Dr. Amin to discuss the image findings and next steps. I have encouraged him to contact the clinic with any questions, concerns, or adverse clinical changes. He verbalized understanding.      KENDALL Gallardo-C  Neurosurgery  [facility].      Note dictated with voice recognition software, please excuse any grammatical errors.

## 2023-10-13 ENCOUNTER — TELEPHONE (OUTPATIENT)
Dept: NEUROSURGERY | Facility: CLINIC | Age: 53
End: 2023-10-13
Payer: MEDICARE

## 2023-10-13 NOTE — TELEPHONE ENCOUNTER
Called pt and scheduled appt with Dr. Amin for 11/1. Confirmed MRI appt on 10/27.    ----- Message from Celeste Bryan MA sent at 10/13/2023 10:06 AM CDT -----  Regarding: appointment  Hey,     Can you please assist me with getting this pt an appointment with Dr. Amin?        Thanks,  Celeste villasenor

## 2023-10-27 ENCOUNTER — HOSPITAL ENCOUNTER (OUTPATIENT)
Dept: RADIOLOGY | Facility: HOSPITAL | Age: 53
Discharge: HOME | End: 2023-10-27
Attending: NURSE PRACTITIONER
Payer: MEDICARE

## 2023-10-27 DIAGNOSIS — M47.812 CERVICAL SPONDYLOSIS: ICD-10-CM

## 2023-10-27 DIAGNOSIS — M54.2 CHRONIC NECK PAIN: ICD-10-CM

## 2023-10-27 DIAGNOSIS — G89.29 CHRONIC NECK PAIN: ICD-10-CM

## 2023-10-27 DIAGNOSIS — M48.02 SPINAL STENOSIS, CERVICAL REGION: ICD-10-CM

## 2023-10-27 PROCEDURE — 72141 MRI NECK SPINE W/O DYE: CPT | Mod: 26,,, | Performed by: RADIOLOGY

## 2023-10-27 PROCEDURE — 72141 MRI CERVICAL SPINE WITHOUT CONTRAST: ICD-10-PCS | Mod: 26,,, | Performed by: RADIOLOGY

## 2023-10-27 PROCEDURE — 72141 MRI NECK SPINE W/O DYE: CPT | Mod: TC

## 2023-11-01 ENCOUNTER — OFFICE VISIT (OUTPATIENT)
Dept: NEUROSURGERY | Facility: CLINIC | Age: 53
End: 2023-11-01
Payer: MEDICARE

## 2023-11-01 VITALS
HEIGHT: 74 IN | SYSTOLIC BLOOD PRESSURE: 133 MMHG | DIASTOLIC BLOOD PRESSURE: 90 MMHG | BODY MASS INDEX: 25.66 KG/M2 | WEIGHT: 199.94 LBS | HEART RATE: 65 BPM

## 2023-11-01 DIAGNOSIS — M47.812 CERVICAL SPONDYLOSIS: Primary | ICD-10-CM

## 2023-11-01 DIAGNOSIS — M54.2 CHRONIC NECK PAIN: ICD-10-CM

## 2023-11-01 DIAGNOSIS — G89.29 CHRONIC NECK PAIN: ICD-10-CM

## 2023-11-01 DIAGNOSIS — R42 DIZZINESS AFTER EXTENSION OF NECK: ICD-10-CM

## 2023-11-01 PROCEDURE — 99215 OFFICE O/P EST HI 40 MIN: CPT | Mod: S$GLB,,, | Performed by: NEUROLOGICAL SURGERY

## 2023-11-01 PROCEDURE — 99215 PR OFFICE/OUTPT VISIT, EST, LEVL V, 40-54 MIN: ICD-10-PCS | Mod: S$GLB,,, | Performed by: NEUROLOGICAL SURGERY

## 2023-11-01 PROCEDURE — 3075F SYST BP GE 130 - 139MM HG: CPT | Mod: CPTII,S$GLB,, | Performed by: NEUROLOGICAL SURGERY

## 2023-11-01 PROCEDURE — 3008F BODY MASS INDEX DOCD: CPT | Mod: CPTII,S$GLB,, | Performed by: NEUROLOGICAL SURGERY

## 2023-11-01 PROCEDURE — 3075F PR MOST RECENT SYSTOLIC BLOOD PRESS GE 130-139MM HG: ICD-10-PCS | Mod: CPTII,S$GLB,, | Performed by: NEUROLOGICAL SURGERY

## 2023-11-01 PROCEDURE — 99999 PR PBB SHADOW E&M-EST. PATIENT-LVL III: ICD-10-PCS | Mod: PBBFAC,,, | Performed by: NEUROLOGICAL SURGERY

## 2023-11-01 PROCEDURE — 1159F PR MEDICATION LIST DOCUMENTED IN MEDICAL RECORD: ICD-10-PCS | Mod: CPTII,S$GLB,, | Performed by: NEUROLOGICAL SURGERY

## 2023-11-01 PROCEDURE — 3080F PR MOST RECENT DIASTOLIC BLOOD PRESSURE >= 90 MM HG: ICD-10-PCS | Mod: CPTII,S$GLB,, | Performed by: NEUROLOGICAL SURGERY

## 2023-11-01 PROCEDURE — 1159F MED LIST DOCD IN RCRD: CPT | Mod: CPTII,S$GLB,, | Performed by: NEUROLOGICAL SURGERY

## 2023-11-01 PROCEDURE — 3080F DIAST BP >= 90 MM HG: CPT | Mod: CPTII,S$GLB,, | Performed by: NEUROLOGICAL SURGERY

## 2023-11-01 PROCEDURE — 1160F PR REVIEW ALL MEDS BY PRESCRIBER/CLIN PHARMACIST DOCUMENTED: ICD-10-PCS | Mod: CPTII,S$GLB,, | Performed by: NEUROLOGICAL SURGERY

## 2023-11-01 PROCEDURE — 1160F RVW MEDS BY RX/DR IN RCRD: CPT | Mod: CPTII,S$GLB,, | Performed by: NEUROLOGICAL SURGERY

## 2023-11-01 PROCEDURE — 3008F PR BODY MASS INDEX (BMI) DOCUMENTED: ICD-10-PCS | Mod: CPTII,S$GLB,, | Performed by: NEUROLOGICAL SURGERY

## 2023-11-01 PROCEDURE — 99999 PR PBB SHADOW E&M-EST. PATIENT-LVL III: CPT | Mod: PBBFAC,,, | Performed by: NEUROLOGICAL SURGERY

## 2023-11-01 NOTE — PROGRESS NOTES
"CHIEF COMPLAINT:        Chief Complaint   Patient presents with    Follow-up    Neck Pain       INTERVAL HISTORY (11/1/23):  Returns with exacerbation of neck pain.  No arm pain.  Neck pain has not responded to any conservative treatments (meds, PT, injections).  Triggered by side bending.  Main complaint involves "dizziness" episodes when he side bends neck to left.  This is problematic because he frequently side bends his neck.  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  He has not followed up with Dr Cadet since.  He has found that PT aggravates more than helps.    His low back is feeling "great."    C7-T1 ILESI (11/2022) by Dr Grady.  No sustained relief.    INTERVAL HISTORY (10/26/22):  Patient returns to clinic to discuss his continued neck and low back pain. Today, he reports that his neck is bothering him more than his back pain. For the past 2-3 months, his neck discomfort has returned. He describes it as a "pressure" that starts posterior midline and radiates outward. He only experiences this pressure when he tilts his head to he left side. When he does so, he experiences disorientation, like his body is "not in tune" which can sometimes affect his vision and ability to screw bolts. Patient is a  and this affects his ability to work. He states that these sensations resolve rather quickly if he turns his head back towards the right. He denies radiculopathic and myelopathic symptoms in his BUE - he does not have any shooting pain or issues with writing, using his phone, or dropping things otherwise. He reports that neck injections about a year ago with Dr. Grady has helped with these neck issues. His back pain is constant and has not worsened or changed since his last visit.     INTERVAL HISTORY (5/18/22):  Patient returns for continued management of neck and low back pain.  He reports that around Easter time he had " exacerbation of his sciatica that lasted approximately 1 week.  He had increased right sided back pain that radiates down the back of his legs to his calf.  The pain is improved but still present.  He has more low back pain than leg pain.  He still feels stiff around the waist.  He underwent a caudal STEPHENIE on 3/3/22 by Dr. Grady that he states helped about 1-1/2 weeks.    He also complains of neck pain that he describes as tightness laterally on the right side of his neck that is exacerbated by side bending to the right.  He underwent ES size of the neck approximately 2 years ago.  He denies any arm pain.  He denies any gait disturbance but does acknowledge some mild decreased dexterity that he notices as a .  He has some numbness in his fingertips that he describes as achy numbness when working above his head.     INTERVAL HISTORY (9/21/20):  F/u re: back and neck pain.    Underwent caudal STEPHENIE on 7/16/20 by Dr Grady and reports continued relief.  Main complaint is neck stiffness and grinding sensation with movement.  Denies any pain in arms.  Feels like hands are slightly weaker in that they cramp up at work as .  Some tingling in the fingers.  Noticed some difficulty opening bottles and unscrewing oil filters with left hand.  Dropping nuts and bolts at work more frequently.  No problems with walking or imbalance.      Refused PT.    States ibuprofen helps with arthritis pain.    INTERVAL HISTORY (6/7/20):  Patient returns for continued management of neck and back pain.  Patient has neck pain was the primary concern during his last visit however today he reports that it is better.  His main complaint today is low back pain and leg pain.  Pain is located over the left SI joint and wraps around the waist as well as radiates up to the ribcage.  He notes that he has pain in his testicles, hip joint and groin.  He also notes some change in temperature sensation in the left thigh that is intermittent.   He describes it as cold and tingling.  It comes on suddenly and will radiate up to his nipples.  This will cause him to have to stand up to help alleviate the sensation.  He also notes that this is accompanied by whole body goose bumps.  The pain an sensation change is made worse by sitting and better with standing.  He reports a recent incident of impotence.     Surgery - L5-S1 lami  Injections - caudual on 10/10/19 - no help  PT - previously for low back but believes he hurt his neck     HPI:  Anmol Angeles Jr. is a 49 y.o.  male with below listed PMH, who is referred for evaluation of neck pain.    Reports pain, attention, and stiffness between his shoulder blades.  He reports decreased range of motion and tension in his neck in all positions except for flexion.  He denies any numbness but reports tingling in the arm and around his neck.  He denies any weakness.  He states that he has to watch his posture and his neck movements to prevent triggering the pain.  He believes the neck pain started after an injury doing PT for his low back.       He has diffuse stenosis in the cervical spine.  He denies any gait disturbance and although he is occasionally clumsy he denies dropping items from his hands or having issues with fine motor skills such as buttoning shirts.  He denies any bowel or bladder issues.       He works various jobs as a , lawncare, and .     PT - none for neck, injured neck during lumbar PT  Injections - low back only          Review of patient's allergies indicates:   Allergen Reactions    Latex, natural rubber                   Past Medical History:   Diagnosis Date    Anxiety      Back pain      Depressive disorder, not elsewhere classified 11/25/2014    Perirectal fistula      Seizures                  Past Surgical History:   Procedure Laterality Date    BACK SURGERY        CAUDAL EPIDURAL STEROID INJECTION N/A 10/10/2019     Procedure: Injection-steroid-epidural-caudal;   Surgeon: Amol Grady Jr., MD;  Location: [facility] OR;  Service: Pain Management;  Laterality: N/A;    COLONOSCOPY N/A 8/22/2016     Procedure: COLONOSCOPY;  Surgeon: TROY Inman MD;  Location: [facility] (4TH FLR);  Service: Endoscopy;  Laterality: N/A;    EXAMINATION UNDER ANESTHESIA N/A 10/12/2018     Procedure: Exam under anesthesia;  Surgeon: TROY Inman MD;  Location: [facility] OR 2ND FLR;  Service: Colon and Rectal;  Laterality: N/A;    fistula surgery        HAND SURGERY Left      INSERTION OF SETON STITCH N/A 10/12/2018     Procedure: PLACEMENT, SETON STITCH;  Surgeon: TROY Inman MD;  Location: [facility] OR 2ND FLR;  Service: Colon and Rectal;  Laterality: N/A;    LEG SURGERY         plate    LEG SURGERY Right        History reviewed. No pertinent family history.  Social History                Tobacco Use    Smoking status: Current Every Day Smoker       Packs/day: 1.50       Years: 30.00       Pack years: 45.00       Types: Cigarettes    Smokeless tobacco: Never Used   Substance Use Topics    Alcohol use: Yes       Comment: beer occasionally     Drug use: Yes       Types: Marijuana         Review of Systems   Constitutional: Negative.    Respiratory: Negative for cough and shortness of breath.    Cardiovascular: Negative for chest pain, palpitations, claudication and leg swelling.   Gastrointestinal: Negative for abdominal pain, constipation and diarrhea.   Genitourinary: Negative for flank pain, frequency and urgency.   Musculoskeletal: Positive for neck pain. Negative for back pain, falls, joint pain and myalgias.   Skin: Negative.    Neurological: Positive for tingling. Negative for dizziness, tremors, sensory change, speech change, focal weakness, seizures, loss of consciousness, weakness and headaches.   Psychiatric/Behavioral: Negative.          OBJECTIVE:     Vitals:    11/01/23 1001   BP: (!) 133/90   Pulse: 65       Physical Exam:  Constitutional: Patient sitting comfortably in chair.  Appears well developed and well nourished.  Skin: Exposed areas are intact without abnormal markings, rashes or other lesions. Vitiligo on bilateral hands.  HEENT: Normocephalic. Normal conjunctivae. Head sometimes titled slightly to the right.  Cardiovascular: Normal rate and regular rhythm.  Respiratory: Chest wall rises and falls symmetrically, without signs of respiratory distress.  Abdomen: Soft and non-tender.  Extremities: Warm and without edema. Calves supple, non-tender.  Psych/Behavior: Normal affect.     Neurological:     Mental status: Alert and oriented. Conversational and appropriate.       Cranial Nerves: VFF to confrontation. PERRL. EOMI without nystagmus. Facial STLT normal and symmetric. Strong, symmetric muscles of mastication. Facial strength full and symmetric. Hearing equal bilaterally to finger rub. Palate and uvula rise and fall normally in midline. Shoulder shrug 5/5 strength. Tongue midline.      Motor:     Upper:   Deltoids Triceps Biceps WE WF  FA     R 5/5 5/5 5/5 5/5 5/5 5/5 5/5     L 5/5 5/5 5/5 5/5 5/5 5/5 5/5      Lower:   HF KE KF DF PF EHL     R 5/5 5/5 5/5 5/5 5/5 5/5     L 5/5 5/5 5/5 5/5 5/5 5/5      Sensory: Intact sensation to light touch and pinprick in all extremities.      Reflexes:      DTR: 2+ knees and biceps symmetrically.     Chris's: Negative.     Babinski's: Negative.     Clonus: Negative.     Cerebellar: Finger-to-nose and rapid alternating movements normal.      Gait:  Stable, fluid.  Good tandem     Spine:     Posture: Head well aligned over pelvis and shoulders in front and side views, tilts head toward right side at times. No focal or global spinal deformity visible on inspection.      Cervical:      ROM: Full with flexion, extension, lateral rotation and ear-to-shoulder bend.      Midline TTP: Negative.     Spurling's test: Negative.     Lhermitte's: Negative.     Thoracic:     Midline TTP: Negative     Lumbar:     Midline TTP: Negative     Straight Leg  Test: Negative     Crossed Straight Leg Test: Negative     Other:     SI joint TTP: Negative.     Tenderness with external/internal hip rotation: Negative.     Diagnostic Results:  All imaging was independently reviewed by me.     Cervical flex/ex, 10/27/23:  No dynamic instability    Cervical MRI, 10/27/23:  1. Moderate CS C3-4 with myelomalacia  2. Moderate severe LRS at C4-5  3, Bilateral NFS at C5-6  4. Mild-moderate CS at C6-7    Cervical MRI, 5/12/22:  1. Moderate CS C3-4 with myelomalacia  2. Moderate severe LRS at C4-5  3, Bilateral NFS at C5-6  4. Mild-moderate CS at C6-7    Lumbar MRI, 2/14/22:  1. Diffuse DDD with straightening of lordosis  2. Moderate severe CS and severe LRS at L3-4    Cervical flex/ex, 5/12/22:  No dynamic instability    Cervical MRI, 11/29/19  1. Moderate CS C3-4 with myelomalacia  2. Moderate severe LRS at C4-5  3, Bilateral NFS at C5-6  4. Mild-moderate CS at C6-7    Lumbar MRI, 9/12/20:  1. Diffuse DDD with straightening of lordosis  2. L2-3 mod L LRS 2/2 HNP, mild bilat NFS  3. L3-4 mild-mod CS, mod L NFS and mild-mod R NFS  4. L4-5 mild CS, mild-mod L NFS, mod R NFS  5. L5-S1 mild CS, mod R NFS, severe L NFS     MRI C spine, dated 11/29/19:  1. Moderate to moderate-severe CS at C3-4 to C6-7  2. Possible myelomalacia at C3-4     CT C spine, dated 11/14/19:  1. Diffuse degen changes     ASSESSMENT/PLAN:     Problem List Items Addressed This Visit          Neuro    Cervical spondylosis - Primary       Orthopedic    Chronic neck pain       Other    Dizziness after extension of neck     VISIT SUMMARY:  Continues to have neck pain without any radicular symptoms.  He does not exhibit any overt signs of myelopathy despite having multilevel cervical stenosis and some myelomalacia.  He does have moderate severe central stenosis at C3-4 but again does not exhibit any radicular or neurogenic claudication symptoms.  I do not recommend surgery at this time and recommend continued conservative  management.  The source of his pain is likely facetogenic.  I also rec he enroll in Health Neck Program and will refer to Dr Hennessy for OMT.  Etiology of dizziness episodes remains unclear but I suspect his work as  which requires awkward neck postures/positions is the trigger for his symptoms.  No VB insufficiency on CTA.  He should f/u with Dr Cadet re: cervicogenic dizziness.     PATIENT EDUCATION:  More than half the clinic visit was spent showing with patient the pertinent findings on imaging and educating the patient about natural history of the pathology.   We discussed options for treatment as well as the risks and benefits of each option.  All questions were answered.      The patient understands and agrees with the following plan of care.     - Decline to healthy back/neck program  - Re-referral to Sports Medicine (Dr Hennessy) re: OMT  - RTC as needed    Time spent on this encounter: 40 minutes. This includes face-to-face time and non-face to face time preparing to see the patient (eg, review of tests), obtaining and/or reviewing separately obtained history, documenting clinical information in the electronic or other health record, independently interpreting results and communicating results to the patient/family/caregiver, or care coordinator.

## 2023-11-03 ENCOUNTER — OFFICE VISIT (OUTPATIENT)
Dept: DERMATOLOGY | Facility: CLINIC | Age: 53
End: 2023-11-03
Payer: MEDICARE

## 2023-11-03 DIAGNOSIS — L30.9 ECZEMA, UNSPECIFIED TYPE: Primary | ICD-10-CM

## 2023-11-03 DIAGNOSIS — L84 CORNS: ICD-10-CM

## 2023-11-03 DIAGNOSIS — L85.3 XEROSIS CUTIS: ICD-10-CM

## 2023-11-03 PROCEDURE — 1159F MED LIST DOCD IN RCRD: CPT | Mod: CPTII,S$GLB,, | Performed by: STUDENT IN AN ORGANIZED HEALTH CARE EDUCATION/TRAINING PROGRAM

## 2023-11-03 PROCEDURE — 1159F PR MEDICATION LIST DOCUMENTED IN MEDICAL RECORD: ICD-10-PCS | Mod: CPTII,S$GLB,, | Performed by: STUDENT IN AN ORGANIZED HEALTH CARE EDUCATION/TRAINING PROGRAM

## 2023-11-03 PROCEDURE — 99999 PR PBB SHADOW E&M-EST. PATIENT-LVL II: ICD-10-PCS | Mod: PBBFAC,,, | Performed by: STUDENT IN AN ORGANIZED HEALTH CARE EDUCATION/TRAINING PROGRAM

## 2023-11-03 PROCEDURE — 1160F PR REVIEW ALL MEDS BY PRESCRIBER/CLIN PHARMACIST DOCUMENTED: ICD-10-PCS | Mod: CPTII,S$GLB,, | Performed by: STUDENT IN AN ORGANIZED HEALTH CARE EDUCATION/TRAINING PROGRAM

## 2023-11-03 PROCEDURE — 99204 PR OFFICE/OUTPT VISIT, NEW, LEVL IV, 45-59 MIN: ICD-10-PCS | Mod: S$GLB,,, | Performed by: STUDENT IN AN ORGANIZED HEALTH CARE EDUCATION/TRAINING PROGRAM

## 2023-11-03 PROCEDURE — 1160F RVW MEDS BY RX/DR IN RCRD: CPT | Mod: CPTII,S$GLB,, | Performed by: STUDENT IN AN ORGANIZED HEALTH CARE EDUCATION/TRAINING PROGRAM

## 2023-11-03 PROCEDURE — 99204 OFFICE O/P NEW MOD 45 MIN: CPT | Mod: S$GLB,,, | Performed by: STUDENT IN AN ORGANIZED HEALTH CARE EDUCATION/TRAINING PROGRAM

## 2023-11-03 PROCEDURE — 99999 PR PBB SHADOW E&M-EST. PATIENT-LVL II: CPT | Mod: PBBFAC,,, | Performed by: STUDENT IN AN ORGANIZED HEALTH CARE EDUCATION/TRAINING PROGRAM

## 2023-11-03 RX ORDER — CLOBETASOL PROPIONATE 0.5 MG/G
OINTMENT TOPICAL
Qty: 60 G | Refills: 2 | Status: SHIPPED | OUTPATIENT
Start: 2023-11-03

## 2023-11-03 NOTE — PROGRESS NOTES
"  Subjective:      Patient ID:  Anmol Angeles Jr. is a 53 y.o. male who presents for   Chief Complaint   Patient presents with    Rash     Hands & Arms     53 y.o. male presents today for a rash.    New patient    Rash  Location: Sometimes has rash on arms and face, but today only hands and wrists today  Duration: On and off since 2005, recent flare was in September  Symptoms: crusty, raw, splitting open, pink; sometimes itchy   Current treatments: wears long sleeved shirts which helps the arms, sometimes wears hat; clobetasol ointment on hands  Prior treatments tried: Clobetasol seems to help for a few hours, working hands OTC  Other pertinent history: Any place exposed to sun seems to flare up out of no where; uses dial soap in shower; not using moisturizer     He also reports calluses on right foot that he intermittently has to "sand down" to remove core. He sometimes has pain with these on standing, but not much today. Last tried to remove core of these lesions this past week. Has upcoming appt with podiatry.       Review of Systems    Objective:   Physical Exam   Constitutional: He appears well-developed and well-nourished.   Neurological: He is oriented to person, place, and time. He is not disoriented.   Skin:   Areas Examined (abnormalities noted in diagram):   RUE Rowena  LUCONTRERAS Inspection Performed               Diagram Legend     Erythematous scaling macule/papule c/w actinic keratosis       Vascular papule c/w angioma      Pigmented verrucoid papule/plaque c/w seborrheic keratosis      Yellow umbilicated papule c/w sebaceous hyperplasia      Irregularly shaped tan macule c/w lentigo     1-2 mm smooth white papules consistent with Milia      Movable subcutaneous cyst with punctum c/w epidermal inclusion cyst      Subcutaneous movable cyst c/w pilar cyst      Firm pink to brown papule c/w dermatofibroma      Pedunculated fleshy papule(s) c/w skin tag(s)      Evenly pigmented macule c/w junctional " nevus     Mildly variegated pigmented, slightly irregular-bordered macule c/w mildly atypical nevus      Flesh colored to evenly pigmented papule c/w intradermal nevus       Pink pearly papule/plaque c/w basal cell carcinoma      Erythematous hyperkeratotic cursted plaque c/w SCC      Surgical scar with no sign of skin cancer recurrence      Open and closed comedones      Inflammatory papules and pustules      Verrucoid papule consistent consistent with wart     Erythematous eczematous patches and plaques     Dystrophic onycholytic nail with subungual debris c/w onychomycosis     Umbilicated papule    Erythematous-base heme-crusted tan verrucoid plaque consistent with inflamed seborrheic keratosis     Erythematous Silvery Scaling Plaque c/w Psoriasis     See annotation      Assessment / Plan:        Hand eczema  Xerosis cutis  Status: chronic condition flaring and/or not at treatment goal  Morphology consistent with eczematous dermatitis today and history of  work, degreasers, frequent hand washing makes this more likely. Educated extensively on dry skin precautions including transition from dial to dove soap, applying bland emollient to body after showering/bathing, wearing gloves when at work, trying vaseline under cotton gloves at night. Providing refill of clobetasol ointment to use BID for 2 weeks at a time during flares, avoiding face and intertriginous areas.   -     clobetasol 0.05% (TEMOVATE) 0.05 % Oint; Use twice daily to affected area. Do not use to face.  Dispense: 60 g; Refill: 2    Corns  Advised to use OTC salicylic acid pads at home. Lesions were paired down at home recently, lending them not amenable to in-clinic mechanical removal today with curette.     Dermatitis unspecified  Reports erythematous itchy rash w sun exposure-- photos reviewed on forearms. This is likely PMLE but clear today so can't confirm dx  Reviewed this rash is from UV exposure, but sometimes improves with repeated  exposure (later in summer)  Wear SPF 50+ sun protection when outdoors and also UPF 50+ sun protective clothing  Clobetasol ointment bid to active areas of body, but not to face. If flares on face contact office can send hydrocortisone 2.5% ointment    RTC prn if condition worsens or fails to improve

## 2024-04-08 ENCOUNTER — OFFICE VISIT (OUTPATIENT)
Dept: URGENT CARE | Facility: CLINIC | Age: 54
End: 2024-04-08
Payer: MEDICAID

## 2024-04-08 VITALS
SYSTOLIC BLOOD PRESSURE: 150 MMHG | BODY MASS INDEX: 25.54 KG/M2 | WEIGHT: 199 LBS | TEMPERATURE: 98 F | HEART RATE: 78 BPM | OXYGEN SATURATION: 98 % | HEIGHT: 74 IN | DIASTOLIC BLOOD PRESSURE: 93 MMHG | RESPIRATION RATE: 18 BRPM

## 2024-04-08 DIAGNOSIS — H10.10 ALLERGIC CONJUNCTIVITIS, UNSPECIFIED LATERALITY: Primary | ICD-10-CM

## 2024-04-08 PROCEDURE — 99213 OFFICE O/P EST LOW 20 MIN: CPT | Mod: S$GLB,,, | Performed by: NURSE PRACTITIONER

## 2024-04-08 RX ORDER — ATORVASTATIN CALCIUM 40 MG/1
40 TABLET, FILM COATED ORAL NIGHTLY
COMMUNITY
Start: 2023-11-16

## 2024-04-08 RX ORDER — GENTAMICIN SULFATE 3 MG/ML
1 SOLUTION/ DROPS OPHTHALMIC EVERY 4 HOURS
Qty: 5 ML | Refills: 0 | Status: SHIPPED | OUTPATIENT
Start: 2024-04-08

## 2024-04-08 RX ORDER — GENTAMICIN SULFATE 3 MG/ML
1 SOLUTION/ DROPS OPHTHALMIC 4 TIMES DAILY
Status: DISCONTINUED | OUTPATIENT
Start: 2024-04-08 | End: 2024-04-08

## 2024-04-08 RX ORDER — ALBUTEROL SULFATE 90 UG/1
AEROSOL, METERED RESPIRATORY (INHALATION)
COMMUNITY
Start: 2023-12-18

## 2024-04-08 RX ORDER — KETOTIFEN FUMARATE 0.35 MG/ML
1 SOLUTION/ DROPS OPHTHALMIC DAILY
Qty: 5 ML | Refills: 0 | Status: SHIPPED | OUTPATIENT
Start: 2024-04-08 | End: 2025-04-08

## 2024-04-08 NOTE — PROGRESS NOTES
"Subjective:      Patient ID: Anmol Angeles Jr. is a 53 y.o. male.    Vitals:  height is 6' 2" (1.88 m) and weight is 90.3 kg (199 lb). His oral temperature is 98.3 °F (36.8 °C). His blood pressure is 150/93 (abnormal) and his pulse is 78. His respiration is 18 and oxygen saturation is 98%.     Chief Complaint: Eye Problem    Pt. Presents c.o. left eye redness.Symps include redness.Symps began Wednesday.    Eye Problem   The left eye is affected. The current episode started in the past 7 days. The problem occurs constantly. The problem has been unchanged. There was no injury mechanism. The pain is at a severity of 0/10. Known exposure: unknown. He Does not wear contacts. Associated symptoms include an eye discharge and eye redness. Pertinent negatives include no blurred vision, double vision, fever, foreign body sensation, itching, nausea, photophobia, recent URI or vomiting. He has tried eye drops for the symptoms. The treatment provided no relief.       Constitution: Negative for fatigue and fever.   Cardiovascular: Negative.    Eyes:  Positive for eye discharge and eye redness. Negative for eye itching, photophobia, double vision and blurred vision.   Respiratory: Negative.     Gastrointestinal:  Negative for nausea and vomiting.      Objective:     Physical Exam   HENT:   Head: Normocephalic.   Eyes: Left eye exhibits discharge.      Comments: + L sclera edema   Pulmonary/Chest: Effort normal and breath sounds normal.   Abdominal: Normal appearance.   Neurological: He is alert.       Assessment:     1. Allergic conjunctivitis, unspecified laterality        Plan:   Mr. Angeles presents for L eye discharge and swelling onset 3 days ago. States he notice a white pimple to L corner of his eye. After if drained his eye felt better. Then on Saturday morning he woke to his L eye matted closed. States he put visine drops in, which felt better. Then he went riding on his motorcycle. Sunday he felt pain to his eye. He " then noticed the sclera was red and swollen.     Allergic conjunctivitis, unspecified laterality  -     ketotifen (ALAWAY) 0.025 % (0.035 %) ophthalmic solution; Place 1 drop into the left eye once daily.  Dispense: 5 mL; Refill: 0  -     gentamicin 0.3 % ophthalmic solution 1 drop      Patient Instructions   Gently remove your eye drainage or crusting with a clean cloth and warm water.  Avoid pollen if an allergy is causing your pink eye. Stay inside as much as you can with the windows closed during peak allergy seasons.  Lubricating eye drops or allergy eye drops may help your eye feel better. Make sure to read the directions carefully. Wash your hands with care before and after you touch your eye.  When you use eye drops, take care not to touch the bottle or dropper to your eye.  If you wear contact lenses, you will need to stop wearing them for a short time until your symptoms go away. If your contacts are disposable, start with fresh ones after your eye gets better. If not, clean your contacts with care. Clean your contact case thoroughly or get a new one.  Avoid sharing towels, washcloths, bedding, or personal items when you have pink eye.

## 2024-04-08 NOTE — PATIENT INSTRUCTIONS
Gently remove your eye drainage or crusting with a clean cloth and warm water.  Avoid pollen if an allergy is causing your pink eye. Stay inside as much as you can with the windows closed during peak allergy seasons.  Lubricating eye drops or allergy eye drops may help your eye feel better. Make sure to read the directions carefully. Wash your hands with care before and after you touch your eye.  When you use eye drops, take care not to touch the bottle or dropper to your eye.  If you wear contact lenses, you will need to stop wearing them for a short time until your symptoms go away. If your contacts are disposable, start with fresh ones after your eye gets better. If not, clean your contacts with care. Clean your contact case thoroughly or get a new one.  Avoid sharing towels, washcloths, bedding, or personal items when you have pink eye.

## 2025-01-02 ENCOUNTER — TELEPHONE (OUTPATIENT)
Dept: SURGERY | Facility: CLINIC | Age: 55
End: 2025-01-02
Payer: MEDICAID

## 2025-01-02 NOTE — TELEPHONE ENCOUNTER
Called twice, went straight to , I left a  for him letting him know we were returning phone call. ----- Message from KATJA Cervantes sent at 1/2/2025  4:01 PM CST -----  Regarding: FW: Same Day Appointment  Contact: Anmol LOPEZ  Can you please call and schedule this patient? Dr Inman has not seen him since 2018. If he is needing to be seen urgently, please let him know he needs to go to ER.    Thank You,  Helen  ----- Message -----  From: Torrie Rowe  Sent: 1/2/2025   9:58 AM CST  To: [facility] Staff  Subject: Same Day Appointment                             SCHEDULING/REQUEST    Appt Type: NP     Date/Time Preference: Same Day     Treating Provider: Storm    Caller Name: Bridgette     Contact Preference: [phone] (home)       Comments/Notes: Pt states that he believes he is developing another Fistula. He had been seen by  in 2018 and states he should be due for another colonoscopy this year. Would like to be seen today. Also states he has trouble with the portal so if you all could call twice to ensure he hears his phone.

## 2025-01-03 ENCOUNTER — TELEPHONE (OUTPATIENT)
Dept: HEMATOLOGY/ONCOLOGY | Facility: CLINIC | Age: 55
End: 2025-01-03
Payer: MEDICAID

## 2025-01-03 ENCOUNTER — TELEPHONE (OUTPATIENT)
Dept: SURGERY | Facility: CLINIC | Age: 55
End: 2025-01-03
Payer: MEDICAID

## 2025-01-03 NOTE — TELEPHONE ENCOUNTER
----- Message from Argelia sent at 1/2/2025  4:37 PM CST -----  Type:  Patient Returning Call    Who Called:  patient   Who Left Message for Patient:  Ofelia    Does the patient know what this is regarding?:  yes  Best Call Back Number:  [phone]  Additional Information:  partient returning call

## 2025-01-03 NOTE — TELEPHONE ENCOUNTER
Called and spoke to pt, scheduled appt for pt. ----- Message from KATJA Estevez sent at 1/3/2025  6:42 AM CST -----  Patient returning your call  ----- Message -----  From: Argelia Campbell  Sent: 1/2/2025   4:38 PM CST  To: [facility]    Type:  Patient Returning Call    Who Called:  patient   Who Left Message for Patient:  Ofelia    Does the patient know what this is regarding?:  yes  Best Call Back Number:  [phone]  Additional Information:  partient returning call

## 2025-01-17 ENCOUNTER — OFFICE VISIT (OUTPATIENT)
Dept: SURGERY | Facility: CLINIC | Age: 55
End: 2025-01-17
Payer: MEDICARE

## 2025-01-17 VITALS
TEMPERATURE: 98 F | SYSTOLIC BLOOD PRESSURE: 136 MMHG | OXYGEN SATURATION: 100 % | HEIGHT: 74 IN | RESPIRATION RATE: 16 BRPM | WEIGHT: 200.38 LBS | BODY MASS INDEX: 25.72 KG/M2 | HEART RATE: 79 BPM | DIASTOLIC BLOOD PRESSURE: 82 MMHG

## 2025-01-17 DIAGNOSIS — K60.30 ANAL FISTULA: Primary | ICD-10-CM

## 2025-01-17 DIAGNOSIS — K61.2 ABSCESS OF ANAL AND RECTAL REGIONS: Primary | ICD-10-CM

## 2025-01-17 DIAGNOSIS — F17.200 NEEDS SMOKING CESSATION EDUCATION: ICD-10-CM

## 2025-01-17 PROCEDURE — 3079F DIAST BP 80-89 MM HG: CPT | Mod: CPTII,S$GLB,, | Performed by: COLON & RECTAL SURGERY

## 2025-01-17 PROCEDURE — 3075F SYST BP GE 130 - 139MM HG: CPT | Mod: CPTII,S$GLB,, | Performed by: COLON & RECTAL SURGERY

## 2025-01-17 PROCEDURE — 1160F RVW MEDS BY RX/DR IN RCRD: CPT | Mod: CPTII,S$GLB,, | Performed by: COLON & RECTAL SURGERY

## 2025-01-17 PROCEDURE — 3008F BODY MASS INDEX DOCD: CPT | Mod: CPTII,S$GLB,, | Performed by: COLON & RECTAL SURGERY

## 2025-01-17 PROCEDURE — 1159F MED LIST DOCD IN RCRD: CPT | Mod: CPTII,S$GLB,, | Performed by: COLON & RECTAL SURGERY

## 2025-01-17 PROCEDURE — 99213 OFFICE O/P EST LOW 20 MIN: CPT | Mod: PBBFAC,PN | Performed by: COLON & RECTAL SURGERY

## 2025-01-17 PROCEDURE — 99213 OFFICE O/P EST LOW 20 MIN: CPT | Mod: S$GLB,,, | Performed by: COLON & RECTAL SURGERY

## 2025-01-17 PROCEDURE — 99999 PR PBB SHADOW E&M-EST. PATIENT-LVL III: CPT | Mod: PBBFAC,,, | Performed by: COLON & RECTAL SURGERY

## 2025-01-17 NOTE — PROGRESS NOTES
PI:  Anmol Angeles Jr. is a 54 y.o. 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  I met him in 2014 2014  INTRAOPERATIVE FINDINGS:  The patient had a right anterolateral external os.    This tracked towards the posterior midline from an anterolateral ischiorectal   space.  Infiltration with hydrogen peroxide showed an internal os in the   posterior midline at the dentate line.     The deep postanal space was drained as this tract was suggestive of a horseshoe   abscess.  There was no purulence noted in the deep postanal space.  No evidence   of communication between the right anterolateral fistula tract and the deep   postanal space.  There was a left anterolateral external os that was suggestive   of a transsphincteric fistula as well.  This communicated with the same internal   os as the right suggestive of a horseshoe intersphincteric abscess.      7-2016  FINDINGS:  The patient was noted to have evidence of an external opening in the   right anterior position.  This coursed to the posterior midline.  There was   minimal suppuration present, but granulation tissue was noted consistent with   chronic fistula-in-ano.  Two prior setons were noted in the left and right   posterolateral positions coursing to the midline internal opening.  Internal opening at dentate line.       FINDINGS:  1.  The patient was noted to have evidence of complex fistulae.  Two   separate internal openings were noted.    2.  The external opening in the left posterior position was noted to course   radially into the anal canal just to the left of midline.  There was noted to be a   minimal amount of muscle involved in this fistula tract.  It was noted be quite   superficial and this was amenable to primary fistulotomy.   3.   A second fistula opening was noted in the posterior midline.  This   coursed to the right posterior position.  There was noted to be   induration and swelling  "associated with this fistula laterally over the gluteal   area.  On probing, there was noted to be an abscess cavity extending deep   laterally and this necessitated unroofing to adequately drain the area.  No   attempt was made at repair of this fistula.    4.  Lastly, there was noted to be a third seton which extended from the right   lateral position to the posterior midline.  There was no evidence of connection  to the right posterior fistula and  there was no evidence of an internal opening  into the anal canal.  This seton was simply removed as the fistula tract was narrow   upon probing and without evidence of any purulence.    12-    FINDINGS:  Blue vessel loop was used as a non-cutting seton extending from the   right lateral position to the right posterior position.  These external openings   communicated in the deep ischiorectal fossa.  There was no evidence of a   separate internal opening associated with the right lateral external opening.    The deep postanal space was explored to exclude a deep occult abscess, fistula.    There was no evidence of suppuration in this region.    6-  FINDINGS:  External opening was noted in the right posterolateral position.  It   was essentially transsphincteric with an internal opening that was above the   level of the dentate line.  This was felt to be not amenable to fistulotomy.    Because of the high location of the fistula and the presence of significant   scarring in the region of the internal opening, he was also felt not to be a   candidate for endorectal advancement flap.  The decision was made to perform   fistulectomy with excision of the distal aspect of the fistula and repair of the   fistula with an insertion of a Reader BIO-A fistula plug.       "Anal retractors were gently placed within the anus and hydrogen peroxide was   injected into the external opening of the fistula confirming the position of the   internal opening, which was " "noted to be right posterior lateral just proximal   to the dentate line.  At this point, a fistula probe was inserted into the   external opening and was carefully passed through to the internal opening,   taking care to prevent any excess pressure as so cause a false tract.  Once the   fistula probe was passed through, a vessel loop was placed through the probes eye  and was pulled through to create a seton"          Multiple operations for transsphincteric anal fistula last in 2018 at which time a seton was inserted.  Lost to follow up    1-  Interval hx:    Never came back after last surgery  3 weeks ago.  Itching, pinching then 3 days later drainage of pus.  Covered area with gauze.      Now no pain.  No d/c  no swelling, no pinching.      Past Medical History:   Diagnosis Date    Anxiety     Back pain     Chronic back pain     sylvester commented it has been "years" and is related to the "type of hard work" he has done all his life and that it is an ongoing pain he deals with daily     Colon polyp     Depressive disorder, not elsewhere classified 11/25/2014    Perirectal fistula     Seizures     "about 7 years ago, had 3 episodes and has not had a seizure in over 2 years" patient spoke in length related to not having a seizure disorder and felt unheard by many doctors when he described his events 7 years ago to his doctors        Past Surgical History:   Procedure Laterality Date    BACK SURGERY      2003/2004    CAUDAL EPIDURAL STEROID INJECTION N/A 10/10/2019    Procedure: Injection-steroid-epidural-caudal;  Surgeon: Amol Grady Jr., MD;  Location: [facility] OR;  Service: Pain Management;  Laterality: N/A;    CAUDAL EPIDURAL STEROID INJECTION N/A 07/16/2020    Procedure: Injection-steroid-epidural-caudal;  Surgeon: Amol Grady Jr., MD;  Location: [facility] OR;  Service: Pain Management;  Laterality: N/A;    CAUDAL EPIDURAL STEROID INJECTION N/A 03/03/2022    CAUDAL EPIDURAL STEROID INJECTION N/A " 03/03/2022    Procedure: Injection-steroid-epidural-caudal;  Surgeon: Amol Grady Jr., MD;  Location: [facility] PAIN MGMT;  Service: Pain Management;  Laterality: N/A;    COLONOSCOPY N/A 08/22/2016    Procedure: COLONOSCOPY;  Surgeon: TROY Inman MD;  Location: [facility] ENDO (4TH FLR);  Service: Endoscopy;  Laterality: N/A;    EPIDURAL STEROID INJECTION N/A 03/18/2021    Procedure: Injection, Steroid, Epidural---T1-2;  Surgeon: Amol Grady Jr., MD;  Location: [facility] OR;  Service: Pain Management;  Laterality: N/A;    EPIDURAL STEROID INJECTION INTO CERVICAL SPINE N/A 11/17/2022    Procedure: Injection-steroid-epidural-cervical---INTERLAMINAR C7-T1;  Surgeon: Amol Grady Jr., MD;  Location: [facility] PAIN MGMT;  Service: Pain Management;  Laterality: N/A;    STEPHENIE  03/18/2021    EXAMINATION UNDER ANESTHESIA N/A 10/12/2018    Procedure: Exam under anesthesia;  Surgeon: TROY Inman MD;  Location: [facility] OR 2ND FLR;  Service: Colon and Rectal;  Laterality: N/A;    fistula surgery      perirectal    HAND SURGERY Left     INJECTION OF ANESTHETIC AGENT AROUND MEDIAL BRANCH NERVES INNERVATING LUMBAR FACET JOINT Bilateral 08/18/2022    Procedure: Block-nerve-medial branch-lumbar---BILATERAL L2, L3, L4;  Surgeon: Amol Grady Jr., MD;  Location: [facility] PAIN MGMT;  Service: Pain Management;  Laterality: Bilateral;    INJECTION OF ANESTHETIC AGENT AROUND MEDIAL BRANCH NERVES INNERVATING LUMBAR FACET JOINT Bilateral 09/01/2022    Procedure: Block-nerve-medial branch-lumbar---BILATERAL L2, L3, L4;  Surgeon: Amol Grady Jr., MD;  Location: [facility] PAIN MGMT;  Service: Pain Management;  Laterality: Bilateral;    Injection-steroid-epidural-cervical---INTERLAMINAR C7-T1  11/17/2022    INSERTION OF SETON STITCH N/A 10/12/2018    Procedure: PLACEMENT, SETON STITCH;  Surgeon: TROY Inman MD;  Location: [facility] OR 2ND FLR;  Service: Colon and Rectal;  Laterality: N/A;    LEG SURGERY      plate    LEG SURGERY  "Right 1993       Review of patient's allergies indicates:   Allergen Reactions    Latex, natural rubber Hives     Swelling/Hives.       No family history on file.    Social History     Socioeconomic History    Marital status: Single   Occupational History    Occupation: Maintenance with [employer]     Employer: [employer]    Tobacco Use    Smoking status: Every Day     Current packs/day: 1.00     Average packs/day: 1 pack/day for 30.0 years (30.0 ttl pk-yrs)     Types: Cigarettes    Smokeless tobacco: Never   Substance and Sexual Activity    Alcohol use: Yes     Comment: beer occasionally     Drug use: Yes     Types: Marijuana    Sexual activity: Yes       ROS:  GENERAL: No fever, chills, fatigability or weight loss.  Integument: No rashes, redness, icterus  CHEST: Denies DILLON, cyanosis, wheezing, cough and sputum production.  CARDIOVASCULAR: Denies chest pain, PND, orthopnea or reduced exercise tolerance.  GI: Denies abd pain, dysphagia, nausea, vomiting, no hematemesis   : Denies burning on urination, no hematuria, no bacteriuria  MSK: No deformities, swelling, joint pain swelling  Neurologic: No HAs, seizures, weakness, paresthesias, gait problems    PE:  General appearance well  /82 (BP Location: Left arm, Patient Position: Sitting)   Pulse 79   Temp 97.7 °F (36.5 °C) (Temporal)   Resp 16   Ht 6' 2" (1.88 m)   Wt 90.9 kg (200 lb 6.4 oz)   SpO2 100%   BMI 25.73 kg/m²     Sclera/ Skin anicteric  AT NC EOMI  Neck supple trachea midline   Chest symmetric, nl excursion, no retractions, breathing comfortably  EXT - no CCE  Neuro:  Mood/ affect nl, alert and oriented x 3, moves all ext's, gait nl    Rectal  Inspection     Wounds healed.    No erythema.  No fluctuance.  No tenderness.  No granulation tissue.      Assessment:  History of complex transspincteric anal fistula  Probable recurrent anal abscess  No evidence of persistent anal abscess    Plan:  Reassured pt  If recurrent symptoms, RTC " prn

## 2025-02-15 PROCEDURE — 99285 EMERGENCY DEPT VISIT HI MDM: CPT

## 2025-02-16 ENCOUNTER — HOSPITAL ENCOUNTER (EMERGENCY)
Facility: HOSPITAL | Age: 55
Discharge: HOME | End: 2025-02-16
Attending: EMERGENCY MEDICINE
Payer: MEDICARE

## 2025-02-16 VITALS
RESPIRATION RATE: 18 BRPM | HEIGHT: 74 IN | TEMPERATURE: 98 F | DIASTOLIC BLOOD PRESSURE: 78 MMHG | OXYGEN SATURATION: 97 % | HEART RATE: 92 BPM | BODY MASS INDEX: 25.67 KG/M2 | SYSTOLIC BLOOD PRESSURE: 132 MMHG | WEIGHT: 200 LBS

## 2025-02-16 DIAGNOSIS — S16.1XXA CERVICAL STRAIN, ACUTE, INITIAL ENCOUNTER: ICD-10-CM
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
## 2025-02-16 PROCEDURE — 63600175 PHARM REV CODE 636 W HCPCS: Mod: TB,JZ | Performed by: EMERGENCY MEDICINE

## 2025-02-16 PROCEDURE — 96372 THER/PROPH/DIAG INJ SC/IM: CPT | Performed by: EMERGENCY MEDICINE

## 2025-02-16 PROCEDURE — 25000003 PHARM REV CODE 250: Performed by: EMERGENCY MEDICINE

## 2025-02-16 RX ORDER — KETOROLAC TROMETHAMINE 30 MG/ML
15 INJECTION, SOLUTION INTRAMUSCULAR; INTRAVENOUS
Status: COMPLETED | OUTPATIENT
Start: 2025-02-16 | End: 2025-02-16

## 2025-02-16 RX ORDER — METHOCARBAMOL 500 MG/1
1000 TABLET, FILM COATED ORAL
Status: COMPLETED | OUTPATIENT
Start: 2025-02-16 | End: 2025-02-16

## 2025-02-16 RX ORDER — METHOCARBAMOL 500 MG/1
1000 TABLET, FILM COATED ORAL 3 TIMES DAILY
Qty: 30 TABLET | Refills: 0 | Status: SHIPPED | OUTPATIENT
Start: 2025-02-16 | End: 2025-02-21

## 2025-02-16 RX ADMIN — METHOCARBAMOL 1000 MG: 500 TABLET ORAL at 06:02

## 2025-02-16 RX ADMIN — KETOROLAC TROMETHAMINE 15 MG: 30 INJECTION, SOLUTION INTRAMUSCULAR; INTRAVENOUS at 04:02
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
## 2025-02-21 ENCOUNTER — OFFICE VISIT (OUTPATIENT)
Dept: INTERNAL MEDICINE | Facility: CLINIC | Age: 55
End: 2025-02-21
Payer: MEDICARE

## 2025-02-21 VITALS
OXYGEN SATURATION: 99 % | BODY MASS INDEX: 26.04 KG/M2 | DIASTOLIC BLOOD PRESSURE: 75 MMHG | SYSTOLIC BLOOD PRESSURE: 135 MMHG | WEIGHT: 202.81 LBS | HEART RATE: 90 BPM

## 2025-02-21 DIAGNOSIS — S16.1XXD CERVICAL STRAIN, ACUTE, SUBSEQUENT ENCOUNTER: ICD-10-CM

## 2025-02-21 DIAGNOSIS — Z09 HOSPITAL DISCHARGE FOLLOW-UP: Primary | ICD-10-CM
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
## 2025-02-21 RX ORDER — DICLOFENAC SODIUM 50 MG/1
50 TABLET, DELAYED RELEASE ORAL 2 TIMES DAILY
Qty: 28 TABLET | Refills: 0 | Status: SHIPPED | OUTPATIENT
Start: 2025-02-21 | End: 2025-03-07
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
## 2025-02-21 NOTE — PATIENT INSTRUCTIONS
You can try alternating heat pad and ice pack as well as a cervical pillow    Diclofenac 50mg twice a day for 2 weeks    Physical therapy

## 2025-03-12 ENCOUNTER — CLINICAL SUPPORT (OUTPATIENT)
Dept: REHABILITATION | Facility: HOSPITAL | Age: 55
End: 2025-03-12
Attending: INTERNAL MEDICINE
Payer: MEDICARE

## 2025-03-12 DIAGNOSIS — M54.12 CERVICAL RADICULOPATHY: ICD-10-CM
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
## 2025-03-12 DIAGNOSIS — M54.2 NECK PAIN: Primary | ICD-10-CM

## 2025-03-12 DIAGNOSIS — S16.1XXD CERVICAL STRAIN, ACUTE, SUBSEQUENT ENCOUNTER: ICD-10-CM

## 2025-03-12 PROCEDURE — 97112 NEUROMUSCULAR REEDUCATION: CPT

## 2025-03-12 PROCEDURE — 97161 PT EVAL LOW COMPLEX 20 MIN: CPT

## 2025-03-12 NOTE — Clinical Note
Dr. Smith, this patient has not had a cervical MRI. I was wondering if CT suffices for ligamentous observation as it relates to the cervical spine. One of his complaints was nausea (which could also be a secondary from pain). I want to make sure there were no transverse / alar ligamentous interruptions since he had MRI. The tests were negative on my part but he did have a lot of pain and hypersensitivity just wanted to let you know out of abundance of caution.   Ronda
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
## 2025-03-28 ENCOUNTER — CLINICAL SUPPORT (OUTPATIENT)
Dept: REHABILITATION | Facility: HOSPITAL | Age: 55
End: 2025-03-28
Payer: MEDICARE

## 2025-03-28 DIAGNOSIS — M54.2 NECK PAIN: Primary | ICD-10-CM

## 2025-03-28 DIAGNOSIS — M54.12 CERVICAL RADICULOPATHY: ICD-10-CM

## 2025-03-28 PROCEDURE — 97112 NEUROMUSCULAR REEDUCATION: CPT | Mod: CQ
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
## 2025-04-04 ENCOUNTER — CLINICAL SUPPORT (OUTPATIENT)
Dept: REHABILITATION | Facility: HOSPITAL | Age: 55
End: 2025-04-04
Payer: MEDICARE

## 2025-04-04 DIAGNOSIS — M54.12 CERVICAL RADICULOPATHY: ICD-10-CM

## 2025-04-04 DIAGNOSIS — M54.2 NECK PAIN: Primary | ICD-10-CM

## 2025-04-04 PROCEDURE — 97112 NEUROMUSCULAR REEDUCATION: CPT
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
## 2025-04-11 ENCOUNTER — CLINICAL SUPPORT (OUTPATIENT)
Dept: REHABILITATION | Facility: HOSPITAL | Age: 55
End: 2025-04-11
Payer: MEDICARE

## 2025-04-11 DIAGNOSIS — M54.2 NECK PAIN: Primary | ICD-10-CM

## 2025-04-11 DIAGNOSIS — M54.12 CERVICAL RADICULOPATHY: ICD-10-CM

## 2025-04-11 PROCEDURE — 97112 NEUROMUSCULAR REEDUCATION: CPT | Mod: PN

## 2025-04-11 PROCEDURE — 97110 THERAPEUTIC EXERCISES: CPT | Mod: PN
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
## 2025-05-02 ENCOUNTER — CLINICAL SUPPORT (OUTPATIENT)
Dept: REHABILITATION | Facility: HOSPITAL | Age: 55
End: 2025-05-02
Payer: MEDICARE

## 2025-05-02 DIAGNOSIS — M54.2 NECK PAIN: Primary | ICD-10-CM

## 2025-05-02 DIAGNOSIS — M54.12 CERVICAL RADICULOPATHY: ICD-10-CM

## 2025-05-02 PROCEDURE — 97110 THERAPEUTIC EXERCISES: CPT | Mod: PN

## 2025-05-02 PROCEDURE — 97112 NEUROMUSCULAR REEDUCATION: CPT | Mod: PN
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
## 2025-05-09 ENCOUNTER — CLINICAL SUPPORT (OUTPATIENT)
Dept: REHABILITATION | Facility: HOSPITAL | Age: 55
End: 2025-05-09
Payer: MEDICARE

## 2025-05-09 DIAGNOSIS — M54.12 CERVICAL RADICULOPATHY: ICD-10-CM

## 2025-05-09 DIAGNOSIS — M54.2 NECK PAIN: Primary | ICD-10-CM

## 2025-05-09 PROCEDURE — 97112 NEUROMUSCULAR REEDUCATION: CPT | Mod: PN

## 2025-05-09 PROCEDURE — 97110 THERAPEUTIC EXERCISES: CPT | Mod: PN

## 2025-05-16 ENCOUNTER — DOCUMENTATION ONLY (OUTPATIENT)
Dept: REHABILITATION | Facility: HOSPITAL | Age: 55
End: 2025-05-16

## 2025-05-16 ENCOUNTER — TELEPHONE (OUTPATIENT)
Dept: ENDOSCOPY | Facility: HOSPITAL | Age: 55
End: 2025-05-16
Payer: MEDICARE

## 2025-05-16 ENCOUNTER — TELEPHONE (OUTPATIENT)
Dept: GASTROENTEROLOGY | Facility: CLINIC | Age: 55
End: 2025-05-16
Payer: MEDICARE

## 2025-05-16 ENCOUNTER — CLINICAL SUPPORT (OUTPATIENT)
Dept: REHABILITATION | Facility: HOSPITAL | Age: 55
End: 2025-05-16
Payer: MEDICARE

## 2025-05-16 DIAGNOSIS — M54.2 NECK PAIN: Primary | ICD-10-CM

## 2025-05-16 PROCEDURE — 97112 NEUROMUSCULAR REEDUCATION: CPT | Mod: PN,CQ

## 2025-05-16 PROCEDURE — 97530 THERAPEUTIC ACTIVITIES: CPT | Mod: PN,CQ

## 2025-05-16 NOTE — TELEPHONE ENCOUNTER
----- Message from Christiana sent at 5/16/2025  3:11 PM CDT -----  Type: Appointment Request Caller is requesting appointment.   Name of Caller:pt When is the first available appointment?na Symptoms:na Would the patient rather a call back or a response via [employer]? call Best Call Back Number:[phone] Additional Information: Pt didn't know he was scheduled for a procedure yesterday. Needs to reschedule.      Please call Back to advise. Thanks!

## 2025-05-16 NOTE — PROGRESS NOTES
PT/PTA met face to face to discuss pt's treatment plan and progress towards established goals. Pt will be seen by a physical therapist minimally every 6th visit or every 30 days.    Lauren Lewis PTA

## 2025-05-16 NOTE — TELEPHONE ENCOUNTER
Called pt. From Inbox msg. To rescheudle Colonoscopy pt. Missed on 5/14. Pt. Did not answer call. Left VM message to call back to reschedule.
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
## 2025-05-16 NOTE — TELEPHONE ENCOUNTER
Pt returned call regarding re-scheduling colonoscopy. Pt was scheduled at [facility]. Phone number provided

## 2025-06-02 ENCOUNTER — TELEPHONE (OUTPATIENT)
Dept: PAIN MEDICINE | Facility: CLINIC | Age: 55
End: 2025-06-02
Payer: MEDICARE

## 2025-06-06 ENCOUNTER — CLINICAL SUPPORT (OUTPATIENT)
Dept: REHABILITATION | Facility: HOSPITAL | Age: 55
End: 2025-06-06
Payer: MEDICARE

## 2025-06-06 DIAGNOSIS — M54.12 CERVICAL RADICULOPATHY: ICD-10-CM

## 2025-06-06 DIAGNOSIS — M54.2 NECK PAIN: Primary | ICD-10-CM

## 2025-06-06 PROCEDURE — 97014 ELECTRIC STIMULATION THERAPY: CPT | Mod: PN

## 2025-06-06 PROCEDURE — 97110 THERAPEUTIC EXERCISES: CPT | Mod: PN

## 2025-06-12 ENCOUNTER — OFFICE VISIT (OUTPATIENT)
Dept: PAIN MEDICINE | Facility: CLINIC | Age: 55
End: 2025-06-12
Payer: MEDICARE

## 2025-06-12 VITALS
SYSTOLIC BLOOD PRESSURE: 139 MMHG | WEIGHT: 202.81 LBS | BODY MASS INDEX: 26.03 KG/M2 | HEART RATE: 73 BPM | DIASTOLIC BLOOD PRESSURE: 89 MMHG | HEIGHT: 74 IN

## 2025-06-12 DIAGNOSIS — M50.30 DDD (DEGENERATIVE DISC DISEASE), CERVICAL: ICD-10-CM

## 2025-06-12 DIAGNOSIS — M54.12 CERVICAL RADICULOPATHY: Primary | ICD-10-CM

## 2025-06-12 DIAGNOSIS — M48.02 CERVICAL SPINAL STENOSIS: ICD-10-CM

## 2025-06-12 DIAGNOSIS — M47.812 CERVICAL SPONDYLOSIS: ICD-10-CM

## 2025-06-12 PROCEDURE — 1159F MED LIST DOCD IN RCRD: CPT | Mod: CPTII,S$GLB,, | Performed by: PAIN MEDICINE

## 2025-06-12 PROCEDURE — 99214 OFFICE O/P EST MOD 30 MIN: CPT | Mod: S$GLB,,, | Performed by: PAIN MEDICINE

## 2025-06-12 PROCEDURE — 1160F RVW MEDS BY RX/DR IN RCRD: CPT | Mod: CPTII,S$GLB,, | Performed by: PAIN MEDICINE

## 2025-06-12 PROCEDURE — 3075F SYST BP GE 130 - 139MM HG: CPT | Mod: CPTII,S$GLB,, | Performed by: PAIN MEDICINE

## 2025-06-12 PROCEDURE — 3008F BODY MASS INDEX DOCD: CPT | Mod: CPTII,S$GLB,, | Performed by: PAIN MEDICINE

## 2025-06-12 PROCEDURE — 99999 PR PBB SHADOW E&M-EST. PATIENT-LVL III: CPT | Mod: PBBFAC,,, | Performed by: PAIN MEDICINE

## 2025-06-12 PROCEDURE — 3079F DIAST BP 80-89 MM HG: CPT | Mod: CPTII,S$GLB,, | Performed by: PAIN MEDICINE
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
## 2025-06-27 DIAGNOSIS — M51.362 DEGENERATION OF INTERVERTEBRAL DISC OF LUMBAR REGION WITH DISCOGENIC BACK PAIN AND LOWER EXTREMITY PAIN: ICD-10-CM

## 2025-06-27 DIAGNOSIS — M50.30 DDD (DEGENERATIVE DISC DISEASE), CERVICAL: Primary | ICD-10-CM

## 2025-06-27 RX ORDER — IBUPROFEN 800 MG/1
800 TABLET, FILM COATED ORAL 3 TIMES DAILY PRN
Qty: 90 TABLET | Refills: 0 | Status: SHIPPED | OUTPATIENT
Start: 2025-06-27 | End: 2025-07-27

## 2025-08-10 ENCOUNTER — DOCUMENTATION ONLY (OUTPATIENT)
Dept: REHABILITATION | Facility: HOSPITAL | Age: 55
End: 2025-08-10
Payer: MEDICARE

## (undated) DEVICE — SEE MEDLINE ITEM 157148

## (undated) DEVICE — SEE MEDLINE ITEM 154981

## (undated) DEVICE — SEE MEDLINE ITEM 146417

## (undated) DEVICE — ELECTRODE REM PLYHSV RETURN 9

## (undated) DEVICE — LUBRICANT SURGILUBE 2 OZ

## (undated) DEVICE — NDL 22GA X1 1/2 REG BEVEL

## (undated) DEVICE — LUBRICANT

## (undated) DEVICE — PANTIES FEMININE NAPKIN LG/XLG

## (undated) DEVICE — BRIEF STRTCH MESH XX-LG

## (undated) DEVICE — TAPE SILK 3IN

## (undated) DEVICE — SYR 10CC LUER LOCK

## (undated) DEVICE — SEE MEDLINE ITEM 152622

## (undated) DEVICE — TRAY MINOR GEN SURG

## (undated) DEVICE — SYR ONLY LUER LOCK 20CC

## (undated) DEVICE — PAD ABD 8X10 STERILE

## (undated) DEVICE — ELECTRODE BLADE INSULATED 1 IN

## (undated) DEVICE — NDL HYPO A BEVEL 22X1 1/2